# Patient Record
Sex: FEMALE | Race: WHITE | NOT HISPANIC OR LATINO | Employment: OTHER | URBAN - METROPOLITAN AREA
[De-identification: names, ages, dates, MRNs, and addresses within clinical notes are randomized per-mention and may not be internally consistent; named-entity substitution may affect disease eponyms.]

---

## 2021-01-08 ENCOUNTER — IMMUNIZATION (OUTPATIENT)
Dept: PHARMACY | Facility: CLINIC | Age: 71
End: 2021-01-08
Payer: MEDICARE

## 2021-01-08 DIAGNOSIS — Z23 NEED FOR VACCINATION: ICD-10-CM

## 2021-02-05 ENCOUNTER — IMMUNIZATION (OUTPATIENT)
Dept: PHARMACY | Facility: CLINIC | Age: 71
End: 2021-02-05
Payer: MEDICARE

## 2021-02-05 DIAGNOSIS — Z23 NEED FOR VACCINATION: Primary | ICD-10-CM

## 2021-02-09 ENCOUNTER — TELEPHONE (OUTPATIENT)
Dept: CARDIOLOGY | Facility: CLINIC | Age: 71
End: 2021-02-09

## 2021-03-12 ENCOUNTER — OFFICE VISIT (OUTPATIENT)
Dept: CARDIOLOGY | Facility: CLINIC | Age: 71
End: 2021-03-12
Payer: MEDICARE

## 2021-03-12 VITALS
SYSTOLIC BLOOD PRESSURE: 182 MMHG | WEIGHT: 186.94 LBS | HEIGHT: 69 IN | DIASTOLIC BLOOD PRESSURE: 83 MMHG | HEART RATE: 85 BPM | BODY MASS INDEX: 27.69 KG/M2

## 2021-03-12 DIAGNOSIS — I48.0 PAROXYSMAL ATRIAL FIBRILLATION: Chronic | ICD-10-CM

## 2021-03-12 DIAGNOSIS — Z79.899 LONG-TERM CURRENT USE OF HIGH RISK MEDICATION OTHER THAN ANTICOAGULANT: Chronic | ICD-10-CM

## 2021-03-12 DIAGNOSIS — Z51.81 ANTICOAGULATION MANAGEMENT ENCOUNTER: Chronic | ICD-10-CM

## 2021-03-12 DIAGNOSIS — E78.00 PURE HYPERCHOLESTEROLEMIA: Chronic | ICD-10-CM

## 2021-03-12 DIAGNOSIS — R09.89 BRUIT OF LEFT CAROTID ARTERY: ICD-10-CM

## 2021-03-12 DIAGNOSIS — Z79.01 ANTICOAGULATION MANAGEMENT ENCOUNTER: Chronic | ICD-10-CM

## 2021-03-12 DIAGNOSIS — I10 ESSENTIAL HYPERTENSION: Primary | Chronic | ICD-10-CM

## 2021-03-12 PROBLEM — I51.7 LEFT VENTRICULAR HYPERTROPHY: Status: ACTIVE | Noted: 2021-03-12

## 2021-03-12 PROBLEM — M81.0 OSTEOPOROSIS: Status: ACTIVE | Noted: 2021-03-12

## 2021-03-12 PROBLEM — E03.8 SUBCLINICAL HYPOTHYROIDISM: Status: ACTIVE | Noted: 2021-03-12

## 2021-03-12 PROBLEM — K22.2 RADIATION-INDUCED ESOPHAGEAL STRICTURE: Status: ACTIVE | Noted: 2017-03-28

## 2021-03-12 PROBLEM — C02.9 MALIGNANT NEOPLASM OF TONGUE: Status: ACTIVE | Noted: 2021-03-12

## 2021-03-12 PROBLEM — I34.1 MITRAL VALVE PROLAPSE: Status: ACTIVE | Noted: 2021-03-12

## 2021-03-12 PROCEDURE — 99999 PR PBB SHADOW E&M-EST. PATIENT-LVL III: ICD-10-PCS | Mod: PBBFAC,,, | Performed by: INTERNAL MEDICINE

## 2021-03-12 PROCEDURE — 99999 PR PBB SHADOW E&M-EST. PATIENT-LVL III: CPT | Mod: PBBFAC,,, | Performed by: INTERNAL MEDICINE

## 2021-03-12 PROCEDURE — 3008F PR BODY MASS INDEX (BMI) DOCUMENTED: ICD-10-PCS | Mod: CPTII,S$GLB,, | Performed by: INTERNAL MEDICINE

## 2021-03-12 PROCEDURE — 1159F MED LIST DOCD IN RCRD: CPT | Mod: S$GLB,,, | Performed by: INTERNAL MEDICINE

## 2021-03-12 PROCEDURE — 3079F DIAST BP 80-89 MM HG: CPT | Mod: CPTII,S$GLB,, | Performed by: INTERNAL MEDICINE

## 2021-03-12 PROCEDURE — 1126F PR PAIN SEVERITY QUANTIFIED, NO PAIN PRESENT: ICD-10-PCS | Mod: S$GLB,,, | Performed by: INTERNAL MEDICINE

## 2021-03-12 PROCEDURE — 3008F BODY MASS INDEX DOCD: CPT | Mod: CPTII,S$GLB,, | Performed by: INTERNAL MEDICINE

## 2021-03-12 PROCEDURE — 99214 PR OFFICE/OUTPT VISIT, EST, LEVL IV, 30-39 MIN: ICD-10-PCS | Mod: S$GLB,,, | Performed by: INTERNAL MEDICINE

## 2021-03-12 PROCEDURE — 3079F PR MOST RECENT DIASTOLIC BLOOD PRESSURE 80-89 MM HG: ICD-10-PCS | Mod: CPTII,S$GLB,, | Performed by: INTERNAL MEDICINE

## 2021-03-12 PROCEDURE — 3288F PR FALLS RISK ASSESSMENT DOCUMENTED: ICD-10-PCS | Mod: CPTII,S$GLB,, | Performed by: INTERNAL MEDICINE

## 2021-03-12 PROCEDURE — 1101F PT FALLS ASSESS-DOCD LE1/YR: CPT | Mod: CPTII,S$GLB,, | Performed by: INTERNAL MEDICINE

## 2021-03-12 PROCEDURE — 1159F PR MEDICATION LIST DOCUMENTED IN MEDICAL RECORD: ICD-10-PCS | Mod: S$GLB,,, | Performed by: INTERNAL MEDICINE

## 2021-03-12 PROCEDURE — 3288F FALL RISK ASSESSMENT DOCD: CPT | Mod: CPTII,S$GLB,, | Performed by: INTERNAL MEDICINE

## 2021-03-12 PROCEDURE — 1101F PR PT FALLS ASSESS DOC 0-1 FALLS W/OUT INJ PAST YR: ICD-10-PCS | Mod: CPTII,S$GLB,, | Performed by: INTERNAL MEDICINE

## 2021-03-12 PROCEDURE — 1126F AMNT PAIN NOTED NONE PRSNT: CPT | Mod: S$GLB,,, | Performed by: INTERNAL MEDICINE

## 2021-03-12 PROCEDURE — 99214 OFFICE O/P EST MOD 30 MIN: CPT | Mod: S$GLB,,, | Performed by: INTERNAL MEDICINE

## 2021-03-12 PROCEDURE — 3077F PR MOST RECENT SYSTOLIC BLOOD PRESSURE >= 140 MM HG: ICD-10-PCS | Mod: CPTII,S$GLB,, | Performed by: INTERNAL MEDICINE

## 2021-03-12 PROCEDURE — 3077F SYST BP >= 140 MM HG: CPT | Mod: CPTII,S$GLB,, | Performed by: INTERNAL MEDICINE

## 2021-03-12 RX ORDER — IRBESARTAN 150 MG/1
150 TABLET ORAL NIGHTLY
Qty: 90 TABLET | Refills: 3 | Status: SHIPPED | OUTPATIENT
Start: 2021-03-12 | End: 2022-05-02 | Stop reason: SDUPTHER

## 2021-03-12 RX ORDER — SPIRONOLACTONE AND HYDROCHLOROTHIAZIDE 25; 25 MG/1; MG/1
1 TABLET ORAL DAILY
Qty: 30 TABLET | Refills: 11 | Status: SHIPPED | OUTPATIENT
Start: 2021-03-12 | End: 2021-03-22 | Stop reason: SDUPTHER

## 2021-03-12 RX ORDER — APIXABAN 5 MG/1
5 TABLET, FILM COATED ORAL 2 TIMES DAILY
COMMUNITY
Start: 2021-01-17 | End: 2021-03-12 | Stop reason: SDUPTHER

## 2021-03-12 RX ORDER — APIXABAN 5 MG/1
5 TABLET, FILM COATED ORAL 2 TIMES DAILY
Qty: 180 TABLET | Refills: 3 | Status: SHIPPED | OUTPATIENT
Start: 2021-03-12 | End: 2022-06-10 | Stop reason: SDUPTHER

## 2021-03-12 RX ORDER — AMLODIPINE BESYLATE 10 MG/1
10 TABLET ORAL DAILY
COMMUNITY
Start: 2021-02-24 | End: 2021-03-12 | Stop reason: SDUPTHER

## 2021-03-12 RX ORDER — IRBESARTAN 150 MG/1
150 TABLET ORAL DAILY
COMMUNITY
Start: 2021-02-15 | End: 2021-03-12 | Stop reason: SDUPTHER

## 2021-03-12 RX ORDER — AMLODIPINE BESYLATE 10 MG/1
10 TABLET ORAL NIGHTLY
Qty: 90 TABLET | Refills: 3 | Status: SHIPPED | OUTPATIENT
Start: 2021-03-12 | End: 2022-05-02 | Stop reason: SDUPTHER

## 2021-03-12 RX ORDER — FLECAINIDE ACETATE 100 MG/1
100 TABLET ORAL 2 TIMES DAILY
COMMUNITY
Start: 2021-02-16 | End: 2023-04-14

## 2021-03-12 RX ORDER — ESOMEPRAZOLE MAGNESIUM 40 MG/1
40 CAPSULE, DELAYED RELEASE ORAL DAILY
COMMUNITY
Start: 2020-02-07

## 2021-03-13 ENCOUNTER — PATIENT MESSAGE (OUTPATIENT)
Dept: CARDIOLOGY | Facility: CLINIC | Age: 71
End: 2021-03-13

## 2021-03-19 ENCOUNTER — HOSPITAL ENCOUNTER (OUTPATIENT)
Dept: CARDIOLOGY | Facility: HOSPITAL | Age: 71
Discharge: HOME | End: 2021-03-19
Attending: INTERNAL MEDICINE
Payer: MEDICARE

## 2021-03-19 DIAGNOSIS — R09.89 BRUIT OF LEFT CAROTID ARTERY: ICD-10-CM

## 2021-03-19 LAB
LEFT ARM DIASTOLIC BLOOD PRESSURE: 70 MMHG
LEFT ARM SYSTOLIC BLOOD PRESSURE: 180 MMHG
LEFT CBA DIAS: 23 CM/S
LEFT CBA SYS: 134 CM/S
LEFT CCA DIST DIAS: 24 CM/S
LEFT CCA DIST SYS: 138 CM/S
LEFT CCA MID DIAS: 27 CM/S
LEFT CCA MID SYS: 123 CM/S
LEFT CCA PROX DIAS: 13 CM/S
LEFT CCA PROX SYS: 75 CM/S
LEFT ECA DIAS: 14 CM/S
LEFT ECA SYS: 140 CM/S
LEFT ICA DIST DIAS: 24 CM/S
LEFT ICA DIST SYS: 90 CM/S
LEFT ICA MID DIAS: 21 CM/S
LEFT ICA MID SYS: 88 CM/S
LEFT ICA PROX DIAS: 14 CM/S
LEFT ICA PROX SYS: 75 CM/S
LEFT VERTEBRAL DIAS: 20 CM/S
LEFT VERTEBRAL SYS: 100 CM/S
OHS CV CAROTID RIGHT ICA EDV HIGHEST: 36
OHS CV CAROTID ULTRASOUND LEFT ICA/CCA RATIO: 0.65
OHS CV CAROTID ULTRASOUND RIGHT ICA/CCA RATIO: 1.68
OHS CV PV CAROTID LEFT HIGHEST CCA: 138
OHS CV PV CAROTID LEFT HIGHEST ICA: 90
OHS CV PV CAROTID RIGHT HIGHEST CCA: 125
OHS CV PV CAROTID RIGHT HIGHEST ICA: 210
OHS CV US CAROTID LEFT HIGHEST EDV: 24
RIGHT ARM DIASTOLIC BLOOD PRESSURE: 70 MMHG
RIGHT ARM SYSTOLIC BLOOD PRESSURE: 170 MMHG
RIGHT CBA DIAS: 22 CM/S
RIGHT CBA SYS: 121 CM/S
RIGHT CCA DIST DIAS: 20 CM/S
RIGHT CCA DIST SYS: 125 CM/S
RIGHT CCA MID DIAS: 23 CM/S
RIGHT CCA MID SYS: 115 CM/S
RIGHT CCA PROX DIAS: 9 CM/S
RIGHT CCA PROX SYS: 62 CM/S
RIGHT ECA DIAS: 26 CM/S
RIGHT ECA SYS: 278 CM/S
RIGHT ICA DIST DIAS: 11 CM/S
RIGHT ICA DIST SYS: 72 CM/S
RIGHT ICA MID DIAS: 36 CM/S
RIGHT ICA MID SYS: 189 CM/S
RIGHT ICA PROX DIAS: 26 CM/S
RIGHT ICA PROX SYS: 210 CM/S
RIGHT VERTEBRAL DIAS: 15 CM/S
RIGHT VERTEBRAL SYS: 83 CM/S

## 2021-03-19 PROCEDURE — 93880 EXTRACRANIAL BILAT STUDY: CPT

## 2021-03-19 PROCEDURE — 93880 EXTRACRANIAL BILAT STUDY: CPT | Mod: 26,,, | Performed by: INTERNAL MEDICINE

## 2021-03-19 PROCEDURE — 93880 CV US DOPPLER CAROTID (CUPID ONLY): ICD-10-PCS | Mod: 26,,, | Performed by: INTERNAL MEDICINE

## 2021-03-21 ENCOUNTER — PATIENT MESSAGE (OUTPATIENT)
Dept: CARDIOLOGY | Facility: CLINIC | Age: 71
End: 2021-03-21

## 2021-03-22 DIAGNOSIS — I10 ESSENTIAL HYPERTENSION: Chronic | ICD-10-CM

## 2021-03-22 RX ORDER — SPIRONOLACTONE AND HYDROCHLOROTHIAZIDE 25; 25 MG/1; MG/1
1 TABLET ORAL DAILY
Qty: 90 TABLET | Refills: 3 | Status: SHIPPED | OUTPATIENT
Start: 2021-03-22 | End: 2021-03-23 | Stop reason: SDUPTHER

## 2021-03-23 DIAGNOSIS — I10 ESSENTIAL HYPERTENSION: Chronic | ICD-10-CM

## 2021-03-23 RX ORDER — SPIRONOLACTONE AND HYDROCHLOROTHIAZIDE 25; 25 MG/1; MG/1
1 TABLET ORAL DAILY
Qty: 90 TABLET | Refills: 3 | Status: SHIPPED | OUTPATIENT
Start: 2021-03-23 | End: 2022-06-13 | Stop reason: SDUPTHER

## 2021-03-24 ENCOUNTER — TELEPHONE (OUTPATIENT)
Dept: CARDIOLOGY | Facility: CLINIC | Age: 71
End: 2021-03-24

## 2021-03-24 ENCOUNTER — PATIENT MESSAGE (OUTPATIENT)
Dept: CARDIOLOGY | Facility: CLINIC | Age: 71
End: 2021-03-24

## 2021-03-24 DIAGNOSIS — E78.00 PURE HYPERCHOLESTEROLEMIA: ICD-10-CM

## 2021-03-24 DIAGNOSIS — R09.89 BRUIT OF LEFT CAROTID ARTERY: Primary | ICD-10-CM

## 2021-03-24 RX ORDER — ROSUVASTATIN CALCIUM 20 MG/1
20 TABLET, COATED ORAL NIGHTLY
Qty: 90 TABLET | Refills: 3 | Status: SHIPPED | OUTPATIENT
Start: 2021-03-24 | End: 2022-03-09 | Stop reason: SDUPTHER

## 2021-03-24 RX ORDER — ROSUVASTATIN CALCIUM 20 MG/1
20 TABLET, COATED ORAL DAILY
COMMUNITY
End: 2021-03-24 | Stop reason: SDUPTHER

## 2021-12-04 ENCOUNTER — OFFICE VISIT (OUTPATIENT)
Dept: URGENT CARE | Facility: CLINIC | Age: 71
End: 2021-12-04
Payer: MEDICARE

## 2021-12-04 VITALS
DIASTOLIC BLOOD PRESSURE: 87 MMHG | RESPIRATION RATE: 17 BRPM | HEART RATE: 75 BPM | TEMPERATURE: 99 F | HEIGHT: 69 IN | BODY MASS INDEX: 27.4 KG/M2 | OXYGEN SATURATION: 96 % | SYSTOLIC BLOOD PRESSURE: 152 MMHG | WEIGHT: 185 LBS

## 2021-12-04 DIAGNOSIS — J06.9 URI, ACUTE: Primary | ICD-10-CM

## 2021-12-04 DIAGNOSIS — J40 BRONCHITIS: ICD-10-CM

## 2021-12-04 LAB
CTP QC/QA: YES
SARS-COV-2 RDRP RESP QL NAA+PROBE: NEGATIVE

## 2021-12-04 PROCEDURE — U0002: ICD-10-PCS | Mod: QW,S$GLB,, | Performed by: FAMILY MEDICINE

## 2021-12-04 PROCEDURE — 4010F PR ACE/ARB THEARPY RXD/TAKEN: ICD-10-PCS | Mod: CPTII,S$GLB,, | Performed by: FAMILY MEDICINE

## 2021-12-04 PROCEDURE — 99203 OFFICE O/P NEW LOW 30 MIN: CPT | Mod: S$GLB,CS,, | Performed by: FAMILY MEDICINE

## 2021-12-04 PROCEDURE — 4010F ACE/ARB THERAPY RXD/TAKEN: CPT | Mod: CPTII,S$GLB,, | Performed by: FAMILY MEDICINE

## 2021-12-04 PROCEDURE — U0002 COVID-19 LAB TEST NON-CDC: HCPCS | Mod: QW,S$GLB,, | Performed by: FAMILY MEDICINE

## 2021-12-04 PROCEDURE — 71046 X-RAY EXAM CHEST 2 VIEWS: CPT | Mod: FY,S$GLB,, | Performed by: STUDENT IN AN ORGANIZED HEALTH CARE EDUCATION/TRAINING PROGRAM

## 2021-12-04 PROCEDURE — 71046 XR CHEST PA AND LATERAL: ICD-10-PCS | Mod: FY,S$GLB,, | Performed by: STUDENT IN AN ORGANIZED HEALTH CARE EDUCATION/TRAINING PROGRAM

## 2021-12-04 PROCEDURE — 99203 PR OFFICE/OUTPT VISIT, NEW, LEVL III, 30-44 MIN: ICD-10-PCS | Mod: S$GLB,CS,, | Performed by: FAMILY MEDICINE

## 2021-12-04 RX ORDER — GUAIFENESIN 600 MG/1
600 TABLET, EXTENDED RELEASE ORAL 2 TIMES DAILY
Qty: 14 TABLET | Refills: 2 | Status: SHIPPED | OUTPATIENT
Start: 2021-12-04 | End: 2021-12-11

## 2021-12-04 RX ORDER — AZITHROMYCIN 250 MG/1
TABLET, FILM COATED ORAL
Qty: 6 TABLET | Refills: 0 | Status: SHIPPED | OUTPATIENT
Start: 2021-12-04 | End: 2022-05-02 | Stop reason: ALTCHOICE

## 2021-12-04 RX ORDER — ALBUTEROL SULFATE 90 UG/1
2 AEROSOL, METERED RESPIRATORY (INHALATION) EVERY 6 HOURS PRN
Qty: 18 G | Refills: 3 | Status: SHIPPED | OUTPATIENT
Start: 2021-12-04 | End: 2022-05-02

## 2022-03-09 ENCOUNTER — PATIENT MESSAGE (OUTPATIENT)
Dept: CARDIOLOGY | Facility: CLINIC | Age: 72
End: 2022-03-09
Payer: MEDICARE

## 2022-03-09 DIAGNOSIS — E78.00 PURE HYPERCHOLESTEROLEMIA: ICD-10-CM

## 2022-03-09 DIAGNOSIS — R09.89 BRUIT OF LEFT CAROTID ARTERY: ICD-10-CM

## 2022-03-09 RX ORDER — ROSUVASTATIN CALCIUM 20 MG/1
20 TABLET, COATED ORAL NIGHTLY
Qty: 90 TABLET | Refills: 3 | Status: SHIPPED | OUTPATIENT
Start: 2022-03-09 | End: 2023-06-19 | Stop reason: SDUPTHER

## 2022-05-02 ENCOUNTER — OFFICE VISIT (OUTPATIENT)
Dept: CARDIOLOGY | Facility: CLINIC | Age: 72
End: 2022-05-02
Payer: MEDICARE

## 2022-05-02 VITALS
HEART RATE: 100 BPM | SYSTOLIC BLOOD PRESSURE: 144 MMHG | DIASTOLIC BLOOD PRESSURE: 85 MMHG | BODY MASS INDEX: 26.58 KG/M2 | HEIGHT: 69 IN | WEIGHT: 179.44 LBS

## 2022-05-02 DIAGNOSIS — I10 PRIMARY HYPERTENSION: ICD-10-CM

## 2022-05-02 DIAGNOSIS — R09.89 BRUIT OF LEFT CAROTID ARTERY: ICD-10-CM

## 2022-05-02 DIAGNOSIS — Z51.81 ANTICOAGULATION MANAGEMENT ENCOUNTER: ICD-10-CM

## 2022-05-02 DIAGNOSIS — Z79.01 ANTICOAGULATION MANAGEMENT ENCOUNTER: ICD-10-CM

## 2022-05-02 DIAGNOSIS — I48.0 PAROXYSMAL ATRIAL FIBRILLATION: Primary | ICD-10-CM

## 2022-05-02 DIAGNOSIS — E78.00 PURE HYPERCHOLESTEROLEMIA: ICD-10-CM

## 2022-05-02 PROCEDURE — 1101F PR PT FALLS ASSESS DOC 0-1 FALLS W/OUT INJ PAST YR: ICD-10-PCS | Mod: CPTII,S$GLB,, | Performed by: INTERNAL MEDICINE

## 2022-05-02 PROCEDURE — 1159F MED LIST DOCD IN RCRD: CPT | Mod: CPTII,S$GLB,, | Performed by: INTERNAL MEDICINE

## 2022-05-02 PROCEDURE — 4010F PR ACE/ARB THEARPY RXD/TAKEN: ICD-10-PCS | Mod: CPTII,S$GLB,, | Performed by: INTERNAL MEDICINE

## 2022-05-02 PROCEDURE — 1126F PR PAIN SEVERITY QUANTIFIED, NO PAIN PRESENT: ICD-10-PCS | Mod: CPTII,S$GLB,, | Performed by: INTERNAL MEDICINE

## 2022-05-02 PROCEDURE — 99214 PR OFFICE/OUTPT VISIT, EST, LEVL IV, 30-39 MIN: ICD-10-PCS | Mod: 25,S$GLB,, | Performed by: INTERNAL MEDICINE

## 2022-05-02 PROCEDURE — 1159F PR MEDICATION LIST DOCUMENTED IN MEDICAL RECORD: ICD-10-PCS | Mod: CPTII,S$GLB,, | Performed by: INTERNAL MEDICINE

## 2022-05-02 PROCEDURE — 1126F AMNT PAIN NOTED NONE PRSNT: CPT | Mod: CPTII,S$GLB,, | Performed by: INTERNAL MEDICINE

## 2022-05-02 PROCEDURE — 4010F ACE/ARB THERAPY RXD/TAKEN: CPT | Mod: CPTII,S$GLB,, | Performed by: INTERNAL MEDICINE

## 2022-05-02 PROCEDURE — 1160F PR REVIEW ALL MEDS BY PRESCRIBER/CLIN PHARMACIST DOCUMENTED: ICD-10-PCS | Mod: CPTII,S$GLB,, | Performed by: INTERNAL MEDICINE

## 2022-05-02 PROCEDURE — 3008F BODY MASS INDEX DOCD: CPT | Mod: CPTII,S$GLB,, | Performed by: INTERNAL MEDICINE

## 2022-05-02 PROCEDURE — 99999 PR PBB SHADOW E&M-EST. PATIENT-LVL III: CPT | Mod: PBBFAC,,, | Performed by: INTERNAL MEDICINE

## 2022-05-02 PROCEDURE — 99214 OFFICE O/P EST MOD 30 MIN: CPT | Mod: 25,S$GLB,, | Performed by: INTERNAL MEDICINE

## 2022-05-02 PROCEDURE — 3288F FALL RISK ASSESSMENT DOCD: CPT | Mod: CPTII,S$GLB,, | Performed by: INTERNAL MEDICINE

## 2022-05-02 PROCEDURE — 1101F PT FALLS ASSESS-DOCD LE1/YR: CPT | Mod: CPTII,S$GLB,, | Performed by: INTERNAL MEDICINE

## 2022-05-02 PROCEDURE — 3079F DIAST BP 80-89 MM HG: CPT | Mod: CPTII,S$GLB,, | Performed by: INTERNAL MEDICINE

## 2022-05-02 PROCEDURE — 3077F SYST BP >= 140 MM HG: CPT | Mod: CPTII,S$GLB,, | Performed by: INTERNAL MEDICINE

## 2022-05-02 PROCEDURE — 3008F PR BODY MASS INDEX (BMI) DOCUMENTED: ICD-10-PCS | Mod: CPTII,S$GLB,, | Performed by: INTERNAL MEDICINE

## 2022-05-02 PROCEDURE — 1160F RVW MEDS BY RX/DR IN RCRD: CPT | Mod: CPTII,S$GLB,, | Performed by: INTERNAL MEDICINE

## 2022-05-02 PROCEDURE — 93000 ELECTROCARDIOGRAM COMPLETE: CPT | Mod: S$GLB,,, | Performed by: INTERNAL MEDICINE

## 2022-05-02 PROCEDURE — 3079F PR MOST RECENT DIASTOLIC BLOOD PRESSURE 80-89 MM HG: ICD-10-PCS | Mod: CPTII,S$GLB,, | Performed by: INTERNAL MEDICINE

## 2022-05-02 PROCEDURE — 99999 PR PBB SHADOW E&M-EST. PATIENT-LVL III: ICD-10-PCS | Mod: PBBFAC,,, | Performed by: INTERNAL MEDICINE

## 2022-05-02 PROCEDURE — 3288F PR FALLS RISK ASSESSMENT DOCUMENTED: ICD-10-PCS | Mod: CPTII,S$GLB,, | Performed by: INTERNAL MEDICINE

## 2022-05-02 PROCEDURE — 93000 EKG 12-LEAD: ICD-10-PCS | Mod: S$GLB,,, | Performed by: INTERNAL MEDICINE

## 2022-05-02 PROCEDURE — 3077F PR MOST RECENT SYSTOLIC BLOOD PRESSURE >= 140 MM HG: ICD-10-PCS | Mod: CPTII,S$GLB,, | Performed by: INTERNAL MEDICINE

## 2022-05-02 RX ORDER — IRBESARTAN 150 MG/1
150 TABLET ORAL NIGHTLY
Qty: 90 TABLET | Refills: 3 | Status: SHIPPED | OUTPATIENT
Start: 2022-05-02 | End: 2023-05-09 | Stop reason: SDUPTHER

## 2022-05-02 RX ORDER — AMLODIPINE BESYLATE 10 MG/1
10 TABLET ORAL NIGHTLY
Qty: 90 TABLET | Refills: 3 | Status: SHIPPED | OUTPATIENT
Start: 2022-05-02 | End: 2023-05-09 | Stop reason: SDUPTHER

## 2022-05-02 RX ORDER — METOPROLOL SUCCINATE 25 MG/1
25 TABLET, EXTENDED RELEASE ORAL DAILY
Qty: 30 TABLET | Refills: 11 | Status: SHIPPED | OUTPATIENT
Start: 2022-05-02 | End: 2022-06-13 | Stop reason: SDUPTHER

## 2022-05-02 NOTE — PROGRESS NOTES
Subjective:   05/02/2022     Patient ID:  Juliana Mayorga is a 71 y.o. female who presents for evaulation of Mitral Valve Prolapse and Hypertension      Comes in for follow-up, does have hypertension, generally at home it is fairly well controlled, but mildly elevated, 132/85 last week.  It is very elevated today, on average 179/98.  She has not had chest pains or tightness, PND or orthopnea.  She does not have a history of coronary artery disease.  She has gained a significant amount of weight lately.  She has a history of radiation to her neck and throat cancer.  She has had esophageal dilatations and now can eat hamburgers.    Paroxysmal atrial fibrillation is present, it is controlled on flecainide.  She remains on anticoagulation.    A lipid profile last year showed a total cholesterol 308, ,  and triglycerides 57.    She is anticoagulated with Eliquis, she does not have bleeding problems.    Hypertension  Pertinent negatives include no chest pain, headaches, malaise/fatigue, orthopnea, palpitations, PND or shortness of breath.       Patient Active Problem List   Diagnosis    Insomnia due to medical condition    Hypertension    Left ventricular hypertrophy    Malignant neoplasm of tongue    Mitral valve prolapse    Osteoporosis    Paroxysmal atrial fibrillation    Radiation-induced esophageal stricture    Subclinical hypothyroidism    Long-term current use of high risk medication other than anticoagulant    Anticoagulation management encounter    Bruit of left carotid artery    Long term current use of anticoagulant    Pure hypercholesterolemia        Past Medical History:   Diagnosis Date    Hypertension        Past Surgical History:   Procedure Laterality Date     egd   once per [facility] at [facility]      BREAST LUMPECTOMY Right 2013    CARDIOVERSION         Review of patient's allergies indicates:   Allergen Reactions    Cefazolin      Other reaction(s): Extreme pain left  leg and right arm  Other reaction(s): Extreme pain left leg and right arm         Current Outpatient Medications:     ELIQUIS 5 mg Tab, Take 1 tablet (5 mg total) by mouth 2 (two) times daily., Disp: 180 tablet, Rfl: 3    esomeprazole (NEXIUM) 40 MG capsule, Take 40 mg by mouth once daily. , Disp: , Rfl:     eszopiclone 3 mg Tab, Take 1 tablet (3 mg total) by mouth every evening., Disp: 90 tablet, Rfl: 1    flecainide (TAMBOCOR) 100 MG Tab, Take 100 mg by mouth 2 (two) times daily., Disp: , Rfl:     rosuvastatin (CRESTOR) 20 MG tablet, Take 1 tablet (20 mg total) by mouth every evening., Disp: 90 tablet, Rfl: 3    amLODIPine (NORVASC) 10 MG tablet, Take 1 tablet (10 mg total) by mouth nightly., Disp: 90 tablet, Rfl: 3    irbesartan (AVAPRO) 150 MG tablet, Take 1 tablet (150 mg total) by mouth every evening., Disp: 90 tablet, Rfl: 3    metoprolol succinate (TOPROL-XL) 25 MG 24 hr tablet, Take 1 tablet (25 mg total) by mouth once daily., Disp: 30 tablet, Rfl: 11    spironolactone-hydrochlorothiazide 25-25mg (ALDACTAZIDE) 25-25 mg Tab, Take 1 tablet by mouth once daily., Disp: 90 tablet, Rfl: 3     Social History     Socioeconomic History    Marital status:    Tobacco Use    Smoking status: Former Smoker     Quit date: 2005     Years since quittin.3    Smokeless tobacco: Never Used   Substance and Sexual Activity    Alcohol use: Yes     Alcohol/week: 3.0 standard drinks     Types: 3 Glasses of wine per week       Family History   Problem Relation Age of Onset    Alzheimer's disease Mother     Cancer Father         Objective:   Review of Systems   Constitutional: Negative for chills, decreased appetite, diaphoresis, fever, malaise/fatigue, weight gain and weight loss.   HENT: Negative for congestion, hearing loss, nosebleeds and sore throat.    Eyes: Negative for double vision, vision loss in left eye and vision loss in right eye.   Cardiovascular: Negative for chest pain, claudication,  cyanosis, dyspnea on exertion, irregular heartbeat, leg swelling, near-syncope, orthopnea, palpitations, paroxysmal nocturnal dyspnea and syncope.   Respiratory: Negative for cough, hemoptysis, shortness of breath, sleep disturbances due to breathing, snoring and wheezing.    Endocrine: Negative for cold intolerance and heat intolerance.   Hematologic/Lymphatic: Negative for adenopathy and bleeding problem. Does not bruise/bleed easily.   Skin: Negative for itching, nail changes and rash.   Musculoskeletal: Negative for arthritis, back pain, falls and myalgias.   Gastrointestinal: Negative for bloating, abdominal pain, anorexia, constipation, diarrhea, hematochezia, melena, nausea and vomiting.   Genitourinary: Negative for frequency, hematuria and nocturia.   Neurological: Negative for focal weakness, headaches, vertigo and weakness.   Psychiatric/Behavioral: Negative for altered mental status, depression and memory loss.   Allergic/Immunologic: Negative for hives.       Vitals:    05/02/22 1333   BP: (!) 144/85   Pulse: 100       Physical Exam  Vitals reviewed.   Constitutional:       General: She is not in acute distress.     Appearance: She is well-developed.   HENT:      Head: Normocephalic and atraumatic.      Nose: Nose normal.   Eyes:      Conjunctiva/sclera: Conjunctivae normal.      Pupils: Pupils are equal, round, and reactive to light.   Neck:      Vascular: No JVD.   Cardiovascular:      Rate and Rhythm: Normal rate and regular rhythm.      Pulses: Intact distal pulses.      Heart sounds: Normal heart sounds. No murmur heard.    No friction rub. No gallop.      Comments: Karlos venous pressure is normal  Pulmonary:      Effort: Pulmonary effort is normal. No respiratory distress.      Breath sounds: Normal breath sounds. No wheezing or rales.   Chest:      Chest wall: No tenderness.   Abdominal:      General: Bowel sounds are normal. There is no distension.      Palpations: Abdomen is soft.       Tenderness: There is no abdominal tenderness.   Musculoskeletal:         General: No tenderness or deformity. Normal range of motion.      Cervical back: Normal range of motion and neck supple.      Right lower leg: Edema ( Trace) present.      Left lower leg: Edema (Trace) present.   Skin:     General: Skin is warm and dry.      Findings: No erythema or rash.   Neurological:      Mental Status: She is alert and oriented to person, place, and time.      Cranial Nerves: No cranial nerve deficit.      Motor: No abnormal muscle tone.      Coordination: Coordination normal.   Psychiatric:         Behavior: Behavior normal.         Thought Content: Thought content normal.         Judgment: Judgment normal.       EKG today is somewhat difficult to interpret, but I think does show atrial fibrillation with a ventricular response of 93 beats per minute.  It is irregular.  No definite regular associations P waves to QRS of present    Assessment and Plan:     Paroxysmal atrial fibrillation  -     EKG 12-lead  -     Magnesium; Future; Expected date: 05/02/2022  -     TSH; Future; Expected date: 05/02/2022  -     Echo; Future; Expected date: 05/09/2022  -     metoprolol succinate (TOPROL-XL) 25 MG 24 hr tablet; Take 1 tablet (25 mg total) by mouth once daily.  Dispense: 30 tablet; Refill: 11    Primary hypertension  Comments:  Well controlled at home  Orders:  -     irbesartan (AVAPRO) 150 MG tablet; Take 1 tablet (150 mg total) by mouth every evening.  Dispense: 90 tablet; Refill: 3  -     amLODIPine (NORVASC) 10 MG tablet; Take 1 tablet (10 mg total) by mouth nightly.  Dispense: 90 tablet; Refill: 3  -     Comprehensive Metabolic Panel; Future; Expected date: 05/09/2022    Bruit of left carotid artery    Anticoagulation management encounter  -     CBC Auto Differential; Future; Expected date: 05/09/2022    Pure hypercholesterolemia  Comments:  Check lipid on high-dose statin  Orders:  -     Lipid Panel; Future; Expected date:  05/09/2022       I will have laboratory work and echocardiogram assess her cardiac status.  Will forward to Dr. Platt so that he can assess for atrial fibrillation.  I will begin metoprolol succinate 25 mg daily for rate control    Follow up in about 1 year (around 5/2/2023).

## 2022-05-03 ENCOUNTER — TELEPHONE (OUTPATIENT)
Dept: CARDIOLOGY | Facility: CLINIC | Age: 72
End: 2022-05-03
Payer: MEDICARE

## 2022-05-03 NOTE — TELEPHONE ENCOUNTER
----- Message from Inessa Whittington sent at 5/3/2022  1:49 PM CDT -----  Regarding: [facility] orders  Good afternoon,    Mrs Andrews needs [facility] orders in order to be able to do labs here because [facility] is not in network with us. Would you mind if I change them to [facility]? Please let me know.    Thank you,  Inessa

## 2022-05-23 ENCOUNTER — PATIENT MESSAGE (OUTPATIENT)
Dept: CARDIOLOGY | Facility: CLINIC | Age: 72
End: 2022-05-23

## 2022-05-23 ENCOUNTER — HOSPITAL ENCOUNTER (OUTPATIENT)
Dept: CARDIOLOGY | Facility: HOSPITAL | Age: 72
Discharge: HOME | End: 2022-05-23
Attending: INTERNAL MEDICINE
Payer: MEDICARE

## 2022-05-23 VITALS
DIASTOLIC BLOOD PRESSURE: 80 MMHG | SYSTOLIC BLOOD PRESSURE: 124 MMHG | HEART RATE: 81 BPM | WEIGHT: 179 LBS | HEIGHT: 69 IN | BODY MASS INDEX: 26.51 KG/M2

## 2022-05-23 DIAGNOSIS — I48.0 PAROXYSMAL ATRIAL FIBRILLATION: ICD-10-CM

## 2022-05-23 PROBLEM — Z51.81 ENCOUNTER FOR MONITORING FLECAINIDE THERAPY: Status: ACTIVE | Noted: 2022-05-23

## 2022-05-23 PROBLEM — Z79.899 ENCOUNTER FOR MONITORING FLECAINIDE THERAPY: Status: ACTIVE | Noted: 2022-05-23

## 2022-05-23 LAB
ASCENDING AORTA: 3.34 CM
AV INDEX (PROSTH): 0.98
AV MEAN GRADIENT: 2 MMHG
AV PEAK GRADIENT: 4 MMHG
AV VALVE AREA: 3.83 CM2
AV VELOCITY RATIO: 0.85
BSA FOR ECHO PROCEDURE: 1.99 M2
CV ECHO LV RWT: 0.43 CM
DOP CALC AO PEAK VEL: 0.95 M/S
DOP CALC AO VTI: 22.03 CM
DOP CALC LVOT AREA: 3.9 CM2
DOP CALC LVOT DIAMETER: 2.23 CM
DOP CALC LVOT PEAK VEL: 0.81 M/S
DOP CALC LVOT STROKE VOLUME: 84.48 CM3
DOP CALC MV VTI: 43.01 CM
DOP CALCLVOT PEAK VEL VTI: 21.64 CM
E WAVE DECELERATION TIME: 211.14 MSEC
E/A RATIO: 2.55
E/E' RATIO: 29.85 M/S
ECHO LV POSTERIOR WALL: 0.9 CM (ref 0.6–1.1)
EJECTION FRACTION: 65 %
FRACTIONAL SHORTENING: 38 % (ref 28–44)
INTERVENTRICULAR SEPTUM: 1.05 CM (ref 0.6–1.1)
IVRT: 74.22 MSEC
LA MAJOR: 6.73 CM
LA MINOR: 6.43 CM
LA WIDTH: 4.81 CM
LEFT ATRIUM SIZE: 4 CM
LEFT ATRIUM VOLUME INDEX MOD: 58.1 ML/M2
LEFT ATRIUM VOLUME INDEX: 54.6 ML/M2
LEFT ATRIUM VOLUME MOD: 114.37 CM3
LEFT ATRIUM VOLUME: 107.55 CM3
LEFT INTERNAL DIMENSION IN SYSTOLE: 2.63 CM (ref 2.1–4)
LEFT VENTRICLE DIASTOLIC VOLUME INDEX: 40.43 ML/M2
LEFT VENTRICLE DIASTOLIC VOLUME: 79.65 ML
LEFT VENTRICLE MASS INDEX: 68 G/M2
LEFT VENTRICLE SYSTOLIC VOLUME INDEX: 12.8 ML/M2
LEFT VENTRICLE SYSTOLIC VOLUME: 25.29 ML
LEFT VENTRICULAR INTERNAL DIMENSION IN DIASTOLE: 4.22 CM (ref 3.5–6)
LEFT VENTRICULAR MASS: 133.5 G
LV LATERAL E/E' RATIO: 24.25 M/S
LV SEPTAL E/E' RATIO: 38.8 M/S
MV MEAN GRADIENT: 1 MMHG
MV PEAK A VEL: 0.76 M/S
MV PEAK E VEL: 1.94 M/S
MV PEAK GRADIENT: 20 MMHG
MV STENOSIS PRESSURE HALF TIME: 61.23 MS
MV VALVE AREA BY CONTINUITY EQUATION: 1.96 CM2
MV VALVE AREA P 1/2 METHOD: 3.59 CM2
PISA TR MAX VEL: 3.62 M/S
PULM VEIN S/D RATIO: 0.71
PV PEAK D VEL: 0.41 M/S
PV PEAK S VEL: 0.29 M/S
RA MAJOR: 6.56 CM
RA PRESSURE: 3 MMHG
RA WIDTH: 3.95 CM
RIGHT VENTRICULAR END-DIASTOLIC DIMENSION: 3.6 CM
SINUS: 3.11 CM
STJ: 2.94 CM
TDI LATERAL: 0.08 M/S
TDI SEPTAL: 0.05 M/S
TDI: 0.07 M/S
TR MAX PG: 52 MMHG
TRICUSPID ANNULAR PLANE SYSTOLIC EXCURSION: 1.68 CM
TV REST PULMONARY ARTERY PRESSURE: 55 MMHG

## 2022-05-23 PROCEDURE — 93306 TTE W/DOPPLER COMPLETE: CPT

## 2022-05-23 PROCEDURE — 93306 ECHO (CUPID ONLY): ICD-10-PCS | Mod: 26,,, | Performed by: INTERNAL MEDICINE

## 2022-05-23 PROCEDURE — 93306 TTE W/DOPPLER COMPLETE: CPT | Mod: 26,,, | Performed by: INTERNAL MEDICINE

## 2022-05-30 ENCOUNTER — TELEPHONE (OUTPATIENT)
Dept: CARDIOLOGY | Facility: CLINIC | Age: 72
End: 2022-05-30
Payer: MEDICARE

## 2022-05-30 ENCOUNTER — PATIENT MESSAGE (OUTPATIENT)
Dept: CARDIOLOGY | Facility: CLINIC | Age: 72
End: 2022-05-30
Payer: MEDICARE

## 2022-05-30 NOTE — TELEPHONE ENCOUNTER
Pt attempting to get patient assistance for Eliquis with [employer]; forms completed and faxed to company at [phone];

## 2022-06-16 ENCOUNTER — PATIENT MESSAGE (OUTPATIENT)
Dept: CARDIOLOGY | Facility: CLINIC | Age: 72
End: 2022-06-16
Payer: MEDICARE

## 2022-06-16 DIAGNOSIS — I10 PRIMARY HYPERTENSION: Primary | ICD-10-CM

## 2022-06-16 DIAGNOSIS — Z79.01 ANTICOAGULATION MANAGEMENT ENCOUNTER: ICD-10-CM

## 2022-06-16 DIAGNOSIS — E78.00 PURE HYPERCHOLESTEROLEMIA: ICD-10-CM

## 2022-06-16 DIAGNOSIS — E03.8 SUBCLINICAL HYPOTHYROIDISM: ICD-10-CM

## 2022-06-16 DIAGNOSIS — Z51.81 ANTICOAGULATION MANAGEMENT ENCOUNTER: ICD-10-CM

## 2022-06-21 ENCOUNTER — TELEPHONE (OUTPATIENT)
Dept: CARDIOLOGY | Facility: CLINIC | Age: 72
End: 2022-06-21
Payer: MEDICARE

## 2022-06-21 DIAGNOSIS — I10 PRIMARY HYPERTENSION: ICD-10-CM

## 2022-06-21 DIAGNOSIS — E87.5 HYPERKALEMIA: Primary | ICD-10-CM

## 2022-06-21 LAB
ALBUMIN: 5 G/DL (ref 3.5–5)
ALP SERPL-CCNC: 93 U/L (ref 38–126)
ALT SERPL W P-5'-P-CCNC: 15 U/L (ref 7–56)
ANION GAP SERPL CALC-SCNC: 18.7 MMOL/L (ref 9–18)
AST SERPL-CCNC: 23 U/L (ref 7–40)
BASOPHILS ABSOLUTE COUNT: 0 THOUSAND/UL (ref 0–0.2)
BASOPHILS NFR BLD: 0.5 % (ref 0–2)
BILIRUB SERPL-MCNC: 0.5 MG/DL (ref 0–1.2)
BUN BLD-MCNC: 29 MG/DL (ref 7–21)
BUN/CREAT SERPL: 23 (ref 6–22)
CALC OSMOLALITY: 280 MOSM/KG (ref 275–295)
CALCIUM SERPL-MCNC: 9.7 MG/DL (ref 8.5–10.3)
CHLORIDE SERPL-SCNC: 103 MMOL/L (ref 98–107)
CHOL/HDLC RATIO: 2.3
CHOLEST SERPL-MSCNC: 216 MG/DL (ref 100–200)
CO2 SERPL-SCNC: 21 MMOL/L (ref 21–31)
CREAT SERPL-MCNC: 1.26 MG/DL (ref 0.5–1)
EGFR: 50 ML/MIN
EOSINOPHIL NFR BLD: 4.2 % (ref 0–4)
EOSINOPHILS ABSOLUTE COUNT: 0.3 THOUSAND/UL (ref 0–0.7)
ERYTHROCYTE [DISTWIDTH] IN BLOOD BY AUTOMATED COUNT: 16.7 % (ref 12–15.3)
GFR: 43 ML/MIN
GLUCOSE SERPL-MCNC: 102 MG/DL (ref 70–100)
HCT VFR BLD AUTO: 41.3 % (ref 37–47)
HDLC SERPL-MCNC: 94 MG/DL (ref 40–75)
HGB BLD-MCNC: 13.5 GM/DL (ref 12–16)
LDLC SERPL CALC-MCNC: 108 MG/DL (ref 0–125)
LYMPHOCYTES %: 29.1 % (ref 15–45)
LYMPHOCYTES ABSOLUTE COUNT: 1.9 THOUSAND/UL (ref 1–4.2)
MAGNESIUM SERPL-MCNC: 1.5 MG/DL (ref 1.7–2.2)
MCH RBC QN AUTO: 31.5 PG (ref 27–33)
MCHC RBC AUTO-ENTMCNC: 32.8 G/DL (ref 32–36)
MCV RBC AUTO: 95.8 FL (ref 81–99)
MONOCYTES %: 8.1 % (ref 3–13)
MONOCYTES ABSOLUTE COUNT: 0.5 THOUSAND/UL (ref 0.1–0.8)
NEUTROPHILS ABSOLUTE COUNT: 3.7 THOUSAND/UL (ref 2.1–7.6)
NEUTROPHILS RELATIVE PERCENT: 58.1 % (ref 32–80)
PLATELET # BLD AUTO: 246 THOUSAND/UL (ref 150–350)
PMV BLD AUTO: 8.3 FL (ref 7–10.2)
POTASSIUM SERPL-SCNC: 5.7 MMOL/L (ref 3.5–5)
RBC # BLD AUTO: 4.31 MILLION/UL (ref 4.2–5.4)
SODIUM BLD-SCNC: 137 MMOL/L (ref 135–145)
TOTAL PROTEIN: 7.4 G/DL (ref 6.3–8.2)
TRIGL SERPL-MCNC: 90 MG/DL (ref 30–150)
TSH SERPL DL<=0.005 MIU/L-ACNC: 6.51 UIU/ML (ref 0.35–4)
WBC # BLD AUTO: 6.4 THOUSAND/UL (ref 4.5–11)

## 2022-06-21 RX ORDER — HYDROCHLOROTHIAZIDE 25 MG/1
25 TABLET ORAL DAILY
Qty: 90 TABLET | Refills: 3 | Status: SHIPPED | OUTPATIENT
Start: 2022-06-21 | End: 2023-04-14

## 2022-06-21 NOTE — TELEPHONE ENCOUNTER
Laboratory reviewed, hyperkalemia is present.  Will discontinue spironolactone/ACT and continue hydrochlorothiazide 25 mg 1 daily.  She should have repeat BMP in 1 month

## 2022-07-07 ENCOUNTER — PATIENT MESSAGE (OUTPATIENT)
Dept: CARDIOLOGY | Facility: CLINIC | Age: 72
End: 2022-07-07
Payer: MEDICARE

## 2022-07-13 ENCOUNTER — PATIENT MESSAGE (OUTPATIENT)
Dept: CARDIOLOGY | Facility: CLINIC | Age: 72
End: 2022-07-13
Payer: MEDICARE

## 2022-07-13 LAB
ANION GAP SERPL CALC-SCNC: 17 MMOL/L (ref 9–18)
BUN BLD-MCNC: 22 MG/DL (ref 7–21)
BUN/CREAT SERPL: 19 (ref 6–22)
CALC OSMOLALITY: 281 MOSM/KG (ref 275–295)
CALCIUM SERPL-MCNC: 10 MG/DL (ref 8.5–10.3)
CHLORIDE SERPL-SCNC: 104 MMOL/L (ref 98–107)
CO2 SERPL-SCNC: 23 MMOL/L (ref 21–31)
CREAT SERPL-MCNC: 1.18 MG/DL (ref 0.5–1)
EGFR: 54 ML/MIN
GFR: 46 ML/MIN
GLUCOSE SERPL-MCNC: 97 MG/DL (ref 70–100)
POTASSIUM SERPL-SCNC: 5 MMOL/L (ref 3.5–5)
SODIUM BLD-SCNC: 139 MMOL/L (ref 135–145)

## 2022-08-09 ENCOUNTER — PATIENT MESSAGE (OUTPATIENT)
Dept: CARDIOLOGY | Facility: CLINIC | Age: 72
End: 2022-08-09
Payer: MEDICARE

## 2022-08-10 DIAGNOSIS — I10 ESSENTIAL HYPERTENSION: Chronic | ICD-10-CM

## 2022-08-10 RX ORDER — APIXABAN 5 MG/1
5 TABLET, FILM COATED ORAL 2 TIMES DAILY
Qty: 60 TABLET | Refills: 11 | Status: SHIPPED | OUTPATIENT
Start: 2022-08-10 | End: 2023-06-26 | Stop reason: SDUPTHER

## 2023-04-14 ENCOUNTER — OFFICE VISIT (OUTPATIENT)
Dept: CARDIOLOGY | Facility: CLINIC | Age: 73
End: 2023-04-14
Payer: MEDICARE

## 2023-04-14 VITALS
SYSTOLIC BLOOD PRESSURE: 120 MMHG | BODY MASS INDEX: 25.93 KG/M2 | HEART RATE: 70 BPM | HEIGHT: 69 IN | DIASTOLIC BLOOD PRESSURE: 70 MMHG | WEIGHT: 175.06 LBS

## 2023-04-14 DIAGNOSIS — Z51.81 ANTICOAGULATION MANAGEMENT ENCOUNTER: ICD-10-CM

## 2023-04-14 DIAGNOSIS — Z79.01 ANTICOAGULATION MANAGEMENT ENCOUNTER: ICD-10-CM

## 2023-04-14 DIAGNOSIS — I10 PRIMARY HYPERTENSION: Primary | ICD-10-CM

## 2023-04-14 DIAGNOSIS — I48.0 PAROXYSMAL ATRIAL FIBRILLATION: ICD-10-CM

## 2023-04-14 DIAGNOSIS — E78.00 PURE HYPERCHOLESTEROLEMIA: ICD-10-CM

## 2023-04-14 DIAGNOSIS — I65.23 CAROTID STENOSIS, ASYMPTOMATIC, BILATERAL: ICD-10-CM

## 2023-04-14 DIAGNOSIS — R01.1 SYSTOLIC MURMUR: ICD-10-CM

## 2023-04-14 PROCEDURE — 99999 PR PBB SHADOW E&M-EST. PATIENT-LVL III: CPT | Mod: PBBFAC,,, | Performed by: INTERNAL MEDICINE

## 2023-04-14 PROCEDURE — 99214 PR OFFICE/OUTPT VISIT, EST, LEVL IV, 30-39 MIN: ICD-10-PCS | Mod: S$GLB,,, | Performed by: INTERNAL MEDICINE

## 2023-04-14 PROCEDURE — 1101F PR PT FALLS ASSESS DOC 0-1 FALLS W/OUT INJ PAST YR: ICD-10-PCS | Mod: CPTII,S$GLB,, | Performed by: INTERNAL MEDICINE

## 2023-04-14 PROCEDURE — 1160F RVW MEDS BY RX/DR IN RCRD: CPT | Mod: CPTII,S$GLB,, | Performed by: INTERNAL MEDICINE

## 2023-04-14 PROCEDURE — 1126F PR PAIN SEVERITY QUANTIFIED, NO PAIN PRESENT: ICD-10-PCS | Mod: CPTII,S$GLB,, | Performed by: INTERNAL MEDICINE

## 2023-04-14 PROCEDURE — 4010F ACE/ARB THERAPY RXD/TAKEN: CPT | Mod: CPTII,S$GLB,, | Performed by: INTERNAL MEDICINE

## 2023-04-14 PROCEDURE — 4010F PR ACE/ARB THEARPY RXD/TAKEN: ICD-10-PCS | Mod: CPTII,S$GLB,, | Performed by: INTERNAL MEDICINE

## 2023-04-14 PROCEDURE — 1159F PR MEDICATION LIST DOCUMENTED IN MEDICAL RECORD: ICD-10-PCS | Mod: CPTII,S$GLB,, | Performed by: INTERNAL MEDICINE

## 2023-04-14 PROCEDURE — 3074F SYST BP LT 130 MM HG: CPT | Mod: CPTII,S$GLB,, | Performed by: INTERNAL MEDICINE

## 2023-04-14 PROCEDURE — 1160F PR REVIEW ALL MEDS BY PRESCRIBER/CLIN PHARMACIST DOCUMENTED: ICD-10-PCS | Mod: CPTII,S$GLB,, | Performed by: INTERNAL MEDICINE

## 2023-04-14 PROCEDURE — 3078F PR MOST RECENT DIASTOLIC BLOOD PRESSURE < 80 MM HG: ICD-10-PCS | Mod: CPTII,S$GLB,, | Performed by: INTERNAL MEDICINE

## 2023-04-14 PROCEDURE — 3008F BODY MASS INDEX DOCD: CPT | Mod: CPTII,S$GLB,, | Performed by: INTERNAL MEDICINE

## 2023-04-14 PROCEDURE — 1159F MED LIST DOCD IN RCRD: CPT | Mod: CPTII,S$GLB,, | Performed by: INTERNAL MEDICINE

## 2023-04-14 PROCEDURE — 99999 PR PBB SHADOW E&M-EST. PATIENT-LVL III: ICD-10-PCS | Mod: PBBFAC,,, | Performed by: INTERNAL MEDICINE

## 2023-04-14 PROCEDURE — 1101F PT FALLS ASSESS-DOCD LE1/YR: CPT | Mod: CPTII,S$GLB,, | Performed by: INTERNAL MEDICINE

## 2023-04-14 PROCEDURE — 3288F FALL RISK ASSESSMENT DOCD: CPT | Mod: CPTII,S$GLB,, | Performed by: INTERNAL MEDICINE

## 2023-04-14 PROCEDURE — 3008F PR BODY MASS INDEX (BMI) DOCUMENTED: ICD-10-PCS | Mod: CPTII,S$GLB,, | Performed by: INTERNAL MEDICINE

## 2023-04-14 PROCEDURE — 3288F PR FALLS RISK ASSESSMENT DOCUMENTED: ICD-10-PCS | Mod: CPTII,S$GLB,, | Performed by: INTERNAL MEDICINE

## 2023-04-14 PROCEDURE — 99214 OFFICE O/P EST MOD 30 MIN: CPT | Mod: S$GLB,,, | Performed by: INTERNAL MEDICINE

## 2023-04-14 PROCEDURE — 1126F AMNT PAIN NOTED NONE PRSNT: CPT | Mod: CPTII,S$GLB,, | Performed by: INTERNAL MEDICINE

## 2023-04-14 PROCEDURE — 3074F PR MOST RECENT SYSTOLIC BLOOD PRESSURE < 130 MM HG: ICD-10-PCS | Mod: CPTII,S$GLB,, | Performed by: INTERNAL MEDICINE

## 2023-04-14 PROCEDURE — 3078F DIAST BP <80 MM HG: CPT | Mod: CPTII,S$GLB,, | Performed by: INTERNAL MEDICINE

## 2023-04-14 RX ORDER — FLECAINIDE ACETATE 150 MG/1
150 TABLET ORAL 2 TIMES DAILY
COMMUNITY
Start: 2023-02-16 | End: 2023-06-17 | Stop reason: ALTCHOICE

## 2023-04-14 RX ORDER — LEVOTHYROXINE SODIUM 50 UG/1
50 TABLET ORAL
COMMUNITY
Start: 2023-02-14 | End: 2023-11-01 | Stop reason: DRUGHIGH

## 2023-04-14 RX ORDER — LEVOTHYROXINE SODIUM 50 UG/1
50 TABLET ORAL
COMMUNITY
Start: 2022-11-23 | End: 2023-04-14

## 2023-04-14 NOTE — PROGRESS NOTES
Subjective:   04/14/2023     Patient ID:  Juliana Mayorga is a 72 y.o. female who presents for evaulation of Follow-up      Comes in for follow-up, does have hypertension, generally at home it is fairly well controlled.      She has not had chest pains or tightness, PND or orthopnea.  She does not have a history of coronary artery disease.     Paroxysmal atrial fibrillation is present, it is controlled on flecainide, she did have recurrences, now on higher dose.  She remains on anticoagulation.  Follows with Dr. Platt.    A lipid profile in 2021 showed a total cholesterol 308, ,  and triglycerides 57.  A repeat in mid 2023 showed a total cholesterol of 216, triglycerides 90, HDL is 94 and .    She is anticoagulated with Eliquis, she does not have bleeding problems.    Hypertension  Pertinent negatives include no chest pain, headaches, malaise/fatigue, orthopnea, palpitations, PND or shortness of breath.     Patient Active Problem List   Diagnosis    Insomnia due to medical condition    Primary hypertension    Left ventricular hypertrophy    Malignant neoplasm of tongue    Mitral valve prolapse    Osteoporosis    Paroxysmal atrial fibrillation    Radiation-induced esophageal stricture    Subclinical hypothyroidism    Long-term current use of high risk medication other than anticoagulant    Anticoagulation management encounter    Carotid stenosis, asymptomatic, bilateral    Long term current use of anticoagulant    Pure hypercholesterolemia    Encounter for monitoring flecainide therapy        History reviewed. No pertinent past medical history.      Past Surgical History:   Procedure Laterality Date     egd   once per month at [facility]      BREAST LUMPECTOMY Right 2013    CARDIOVERSION         Review of patient's allergies indicates:   Allergen Reactions    Cefazolin      Other reaction(s): Extreme pain left leg and right arm  Other reaction(s): Extreme pain left leg and right arm          Current Outpatient Medications:     amLODIPine (NORVASC) 10 MG tablet, Take 1 tablet (10 mg total) by mouth nightly., Disp: 90 tablet, Rfl: 3    ELIQUIS 5 mg Tab, Take 1 tablet (5 mg total) by mouth 2 (two) times daily., Disp: 60 tablet, Rfl: 11    esomeprazole (NEXIUM) 40 MG capsule, Take 40 mg by mouth once daily. , Disp: , Rfl:     eszopiclone 3 mg Tab, Take 1 tablet (3 mg total) by mouth every evening., Disp: 90 tablet, Rfl: 1    flecainide (TAMBOCOR) 150 MG Tab, Take 150 mg by mouth 2 (two) times daily., Disp: , Rfl:     irbesartan (AVAPRO) 150 MG tablet, Take 1 tablet (150 mg total) by mouth every evening., Disp: 90 tablet, Rfl: 3    levothyroxine (SYNTHROID) 50 MCG tablet, Take 50 mcg by mouth., Disp: , Rfl:     rosuvastatin (CRESTOR) 20 MG tablet, Take 1 tablet (20 mg total) by mouth every evening., Disp: 90 tablet, Rfl: 3     Social History     Socioeconomic History    Marital status:    Tobacco Use    Smoking status: Former     Types: Cigarettes     Quit date: 2005     Years since quittin.2    Smokeless tobacco: Never   Substance and Sexual Activity    Alcohol use: Yes     Alcohol/week: 3.0 standard drinks     Types: 3 Glasses of wine per week       Family History   Problem Relation Age of Onset    Alzheimer's disease Mother     Cancer Father         Objective:   Review of Systems   Constitutional: Negative for chills, decreased appetite, diaphoresis, fever, malaise/fatigue, weight gain and weight loss.   HENT:  Negative for congestion, hearing loss, nosebleeds and sore throat.    Eyes:  Negative for double vision, vision loss in left eye and vision loss in right eye.   Cardiovascular:  Negative for chest pain, claudication, cyanosis, dyspnea on exertion, irregular heartbeat, leg swelling, near-syncope, orthopnea, palpitations, paroxysmal nocturnal dyspnea and syncope.   Respiratory:  Negative for cough, hemoptysis, shortness of breath, sleep disturbances due to breathing, snoring  and wheezing.    Endocrine: Negative for cold intolerance and heat intolerance.   Hematologic/Lymphatic: Negative for adenopathy and bleeding problem. Does not bruise/bleed easily.   Skin:  Negative for itching, nail changes and rash.   Musculoskeletal:  Negative for arthritis, back pain, falls and myalgias.   Gastrointestinal:  Negative for bloating, abdominal pain, anorexia, constipation, diarrhea, hematochezia, melena, nausea and vomiting.   Genitourinary:  Negative for frequency, hematuria and nocturia.   Neurological:  Negative for focal weakness, headaches, vertigo and weakness.   Psychiatric/Behavioral:  Negative for altered mental status, depression and memory loss.    Allergic/Immunologic: Negative for hives.     Vitals:    04/14/23 1338   BP: 120/70   Pulse: 70         Physical Exam  Vitals reviewed.   Constitutional:       General: She is not in acute distress.     Appearance: She is well-developed.   HENT:      Head: Normocephalic and atraumatic.      Nose: Nose normal.   Eyes:      Conjunctiva/sclera: Conjunctivae normal.      Pupils: Pupils are equal, round, and reactive to light.   Neck:      Vascular: Carotid bruit (left greater than right) present. No JVD.   Cardiovascular:      Rate and Rhythm: Normal rate and regular rhythm.      Pulses: Normal pulses and intact distal pulses.      Heart sounds: Murmur (Grade 2 systolic ejection murmur) heard.     No friction rub. No gallop.      Comments: Karlos venous pressure is normal  Pulmonary:      Effort: Pulmonary effort is normal. No respiratory distress.      Breath sounds: Normal breath sounds. No wheezing or rales.   Chest:      Chest wall: No tenderness.   Abdominal:      General: Bowel sounds are normal. There is no distension.      Palpations: Abdomen is soft.      Tenderness: There is no abdominal tenderness.   Musculoskeletal:         General: No tenderness or deformity. Normal range of motion.      Cervical back: Normal range of motion and neck  supple.      Right lower leg: Edema ( Trace) present.      Left lower leg: Edema (Trace) present.   Skin:     General: Skin is warm and dry.      Findings: No erythema or rash.   Neurological:      Mental Status: She is alert and oriented to person, place, and time.      Cranial Nerves: No cranial nerve deficit.      Motor: No abnormal muscle tone.      Coordination: Coordination normal.   Psychiatric:         Behavior: Behavior normal.         Thought Content: Thought content normal.         Judgment: Judgment normal.         Assessment and Plan:     Primary hypertension  Comments:  Blood pressure has been elevated here, but shows me her values from home which are always excellent, no changes therapy    Systolic murmur  Comments:  ECHO in 6 months  Orders:  -     Cancel: Echo; Future; Expected date: 04/21/2023  -     Echo; Future; Expected date: 10/14/2023    Paroxysmal atrial fibrillation  Comments:  Well controlled on flecainide    Pure hypercholesterolemia  Comments:  On high-dose statin therapy    Anticoagulation management encounter  Comments:  No bleeding problems    Carotid stenosis, asymptomatic, bilateral  Comments:  Carotid ultrasound in 6 months  Orders:  -     CV Ultrasound Bilateral Doppler Carotid; Future; Expected date: 10/14/2023             Follow up in about 6 months (around 10/14/2023).

## 2023-05-09 DIAGNOSIS — I10 PRIMARY HYPERTENSION: ICD-10-CM

## 2023-05-09 RX ORDER — AMLODIPINE BESYLATE 10 MG/1
10 TABLET ORAL NIGHTLY
Qty: 90 TABLET | Refills: 3 | Status: SHIPPED | OUTPATIENT
Start: 2023-05-09

## 2023-05-09 RX ORDER — IRBESARTAN 150 MG/1
150 TABLET ORAL NIGHTLY
Qty: 90 TABLET | Refills: 3 | Status: SHIPPED | OUTPATIENT
Start: 2023-05-09

## 2023-06-07 ENCOUNTER — TELEPHONE (OUTPATIENT)
Dept: CARDIOLOGY | Facility: CLINIC | Age: 73
End: 2023-06-07
Payer: MEDICARE

## 2023-06-07 DIAGNOSIS — I48.0 PAROXYSMAL ATRIAL FIBRILLATION: Primary | ICD-10-CM

## 2023-06-07 NOTE — TELEPHONE ENCOUNTER
Patient now with recurrent atrial fibrillation, she would like a 2nd opinion as to the need for ablation.  Will arrange.

## 2023-06-08 DIAGNOSIS — I49.8 OTHER CARDIAC ARRHYTHMIA: Primary | ICD-10-CM

## 2023-06-14 ENCOUNTER — TELEPHONE (OUTPATIENT)
Dept: ELECTROPHYSIOLOGY | Facility: CLINIC | Age: 73
End: 2023-06-14
Payer: MEDICARE

## 2023-06-15 ENCOUNTER — HOSPITAL ENCOUNTER (OUTPATIENT)
Dept: CARDIOLOGY | Facility: CLINIC | Age: 73
Discharge: HOME | End: 2023-06-15
Payer: MEDICARE

## 2023-06-15 ENCOUNTER — OFFICE VISIT (OUTPATIENT)
Dept: ELECTROPHYSIOLOGY | Facility: CLINIC | Age: 73
End: 2023-06-15
Payer: MEDICARE

## 2023-06-15 VITALS
DIASTOLIC BLOOD PRESSURE: 70 MMHG | HEART RATE: 71 BPM | WEIGHT: 183.44 LBS | HEIGHT: 69 IN | BODY MASS INDEX: 27.17 KG/M2 | SYSTOLIC BLOOD PRESSURE: 140 MMHG

## 2023-06-15 DIAGNOSIS — I49.8 OTHER CARDIAC ARRHYTHMIA: ICD-10-CM

## 2023-06-15 DIAGNOSIS — E78.00 PURE HYPERCHOLESTEROLEMIA: ICD-10-CM

## 2023-06-15 DIAGNOSIS — I51.7 LEFT VENTRICULAR HYPERTROPHY: ICD-10-CM

## 2023-06-15 DIAGNOSIS — Z79.01 LONG TERM CURRENT USE OF ANTICOAGULANT: ICD-10-CM

## 2023-06-15 DIAGNOSIS — E66.3 OVERWEIGHT (BMI 25.0-29.9): ICD-10-CM

## 2023-06-15 DIAGNOSIS — M81.0 AGE-RELATED OSTEOPOROSIS WITHOUT CURRENT PATHOLOGICAL FRACTURE: ICD-10-CM

## 2023-06-15 DIAGNOSIS — I45.10 RBBB: ICD-10-CM

## 2023-06-15 DIAGNOSIS — I34.0 NONRHEUMATIC MITRAL VALVE REGURGITATION: ICD-10-CM

## 2023-06-15 DIAGNOSIS — N18.30 STAGE 3 CHRONIC KIDNEY DISEASE, UNSPECIFIED WHETHER STAGE 3A OR 3B CKD: ICD-10-CM

## 2023-06-15 DIAGNOSIS — K22.2 RADIATION-INDUCED ESOPHAGEAL STRICTURE: ICD-10-CM

## 2023-06-15 DIAGNOSIS — I36.1 NONRHEUMATIC TRICUSPID VALVE REGURGITATION: ICD-10-CM

## 2023-06-15 DIAGNOSIS — I48.19 PERSISTENT ATRIAL FIBRILLATION: Primary | ICD-10-CM

## 2023-06-15 DIAGNOSIS — I65.23 CAROTID STENOSIS, ASYMPTOMATIC, BILATERAL: ICD-10-CM

## 2023-06-15 DIAGNOSIS — E03.8 SUBCLINICAL HYPOTHYROIDISM: ICD-10-CM

## 2023-06-15 DIAGNOSIS — I10 PRIMARY HYPERTENSION: ICD-10-CM

## 2023-06-15 DIAGNOSIS — I51.89 GRADE III DIASTOLIC DYSFUNCTION: ICD-10-CM

## 2023-06-15 DIAGNOSIS — C02.9 MALIGNANT NEOPLASM OF TONGUE: ICD-10-CM

## 2023-06-15 PROCEDURE — 3078F PR MOST RECENT DIASTOLIC BLOOD PRESSURE < 80 MM HG: ICD-10-PCS | Mod: CPTII,S$GLB,, | Performed by: STUDENT IN AN ORGANIZED HEALTH CARE EDUCATION/TRAINING PROGRAM

## 2023-06-15 PROCEDURE — 3008F PR BODY MASS INDEX (BMI) DOCUMENTED: ICD-10-PCS | Mod: CPTII,S$GLB,, | Performed by: STUDENT IN AN ORGANIZED HEALTH CARE EDUCATION/TRAINING PROGRAM

## 2023-06-15 PROCEDURE — 3078F DIAST BP <80 MM HG: CPT | Mod: CPTII,S$GLB,, | Performed by: STUDENT IN AN ORGANIZED HEALTH CARE EDUCATION/TRAINING PROGRAM

## 2023-06-15 PROCEDURE — 3008F BODY MASS INDEX DOCD: CPT | Mod: CPTII,S$GLB,, | Performed by: STUDENT IN AN ORGANIZED HEALTH CARE EDUCATION/TRAINING PROGRAM

## 2023-06-15 PROCEDURE — 4010F ACE/ARB THERAPY RXD/TAKEN: CPT | Mod: CPTII,S$GLB,, | Performed by: STUDENT IN AN ORGANIZED HEALTH CARE EDUCATION/TRAINING PROGRAM

## 2023-06-15 PROCEDURE — 99999 PR PBB SHADOW E&M-EST. PATIENT-LVL III: CPT | Mod: PBBFAC,,, | Performed by: STUDENT IN AN ORGANIZED HEALTH CARE EDUCATION/TRAINING PROGRAM

## 2023-06-15 PROCEDURE — 93010 RHYTHM STRIP: ICD-10-PCS | Mod: S$GLB,,, | Performed by: INTERNAL MEDICINE

## 2023-06-15 PROCEDURE — 3077F SYST BP >= 140 MM HG: CPT | Mod: CPTII,S$GLB,, | Performed by: STUDENT IN AN ORGANIZED HEALTH CARE EDUCATION/TRAINING PROGRAM

## 2023-06-15 PROCEDURE — 1101F PR PT FALLS ASSESS DOC 0-1 FALLS W/OUT INJ PAST YR: ICD-10-PCS | Mod: CPTII,S$GLB,, | Performed by: STUDENT IN AN ORGANIZED HEALTH CARE EDUCATION/TRAINING PROGRAM

## 2023-06-15 PROCEDURE — 3288F PR FALLS RISK ASSESSMENT DOCUMENTED: ICD-10-PCS | Mod: CPTII,S$GLB,, | Performed by: STUDENT IN AN ORGANIZED HEALTH CARE EDUCATION/TRAINING PROGRAM

## 2023-06-15 PROCEDURE — 1126F PR PAIN SEVERITY QUANTIFIED, NO PAIN PRESENT: ICD-10-PCS | Mod: CPTII,S$GLB,, | Performed by: STUDENT IN AN ORGANIZED HEALTH CARE EDUCATION/TRAINING PROGRAM

## 2023-06-15 PROCEDURE — 99999 PR PBB SHADOW E&M-EST. PATIENT-LVL III: ICD-10-PCS | Mod: PBBFAC,,, | Performed by: STUDENT IN AN ORGANIZED HEALTH CARE EDUCATION/TRAINING PROGRAM

## 2023-06-15 PROCEDURE — 93005 ELECTROCARDIOGRAM TRACING: CPT | Mod: S$GLB,,, | Performed by: STUDENT IN AN ORGANIZED HEALTH CARE EDUCATION/TRAINING PROGRAM

## 2023-06-15 PROCEDURE — 99205 PR OFFICE/OUTPT VISIT, NEW, LEVL V, 60-74 MIN: ICD-10-PCS | Mod: S$GLB,,, | Performed by: STUDENT IN AN ORGANIZED HEALTH CARE EDUCATION/TRAINING PROGRAM

## 2023-06-15 PROCEDURE — 4010F PR ACE/ARB THEARPY RXD/TAKEN: ICD-10-PCS | Mod: CPTII,S$GLB,, | Performed by: STUDENT IN AN ORGANIZED HEALTH CARE EDUCATION/TRAINING PROGRAM

## 2023-06-15 PROCEDURE — 1159F MED LIST DOCD IN RCRD: CPT | Mod: CPTII,S$GLB,, | Performed by: STUDENT IN AN ORGANIZED HEALTH CARE EDUCATION/TRAINING PROGRAM

## 2023-06-15 PROCEDURE — 3288F FALL RISK ASSESSMENT DOCD: CPT | Mod: CPTII,S$GLB,, | Performed by: STUDENT IN AN ORGANIZED HEALTH CARE EDUCATION/TRAINING PROGRAM

## 2023-06-15 PROCEDURE — 93005 RHYTHM STRIP: ICD-10-PCS | Mod: S$GLB,,, | Performed by: STUDENT IN AN ORGANIZED HEALTH CARE EDUCATION/TRAINING PROGRAM

## 2023-06-15 PROCEDURE — 99205 OFFICE O/P NEW HI 60 MIN: CPT | Mod: S$GLB,,, | Performed by: STUDENT IN AN ORGANIZED HEALTH CARE EDUCATION/TRAINING PROGRAM

## 2023-06-15 PROCEDURE — 3077F PR MOST RECENT SYSTOLIC BLOOD PRESSURE >= 140 MM HG: ICD-10-PCS | Mod: CPTII,S$GLB,, | Performed by: STUDENT IN AN ORGANIZED HEALTH CARE EDUCATION/TRAINING PROGRAM

## 2023-06-15 PROCEDURE — 1101F PT FALLS ASSESS-DOCD LE1/YR: CPT | Mod: CPTII,S$GLB,, | Performed by: STUDENT IN AN ORGANIZED HEALTH CARE EDUCATION/TRAINING PROGRAM

## 2023-06-15 PROCEDURE — 93010 ELECTROCARDIOGRAM REPORT: CPT | Mod: S$GLB,,, | Performed by: INTERNAL MEDICINE

## 2023-06-15 PROCEDURE — 1159F PR MEDICATION LIST DOCUMENTED IN MEDICAL RECORD: ICD-10-PCS | Mod: CPTII,S$GLB,, | Performed by: STUDENT IN AN ORGANIZED HEALTH CARE EDUCATION/TRAINING PROGRAM

## 2023-06-15 PROCEDURE — 1126F AMNT PAIN NOTED NONE PRSNT: CPT | Mod: CPTII,S$GLB,, | Performed by: STUDENT IN AN ORGANIZED HEALTH CARE EDUCATION/TRAINING PROGRAM

## 2023-06-15 NOTE — Clinical Note
Ángela Rm,   I am stopping this patient's flecainide now, and will plan for about a 2 week washout. We will then need to get her back for a DINORA/cardioversion with amiodarone to start at that time.  Thank you!

## 2023-06-17 PROBLEM — I45.10 RBBB: Status: ACTIVE | Noted: 2023-06-17

## 2023-06-17 PROBLEM — N18.30 CKD (CHRONIC KIDNEY DISEASE), STAGE III: Status: ACTIVE | Noted: 2023-06-17

## 2023-06-17 NOTE — PROGRESS NOTES
Electrophysiology Clinic Note    Reason for new patient visit: Evaluation and recommendations regarding persistent atrial fibrillation.     PRESENTING HISTORY:     History of Present Illness:  Ms. Juliana Mayorga is a cecilio 72-year-old woman who presents today for evaluation and recommendations regarding persistent atrial fibrillation. She has a past medical history significant for persistent atrial fibrillation, baseline RBBB, hypertension, hyperlipidemia, bilateral carotid artery stenosis, moderate mitral regurgitation, moderate tricuspid regurgitation, grade III diastolic dysfunction, CKD stage III, osteoporosis, a history of a malignant neoplasm of the tongue with radiation induced esophageal stricture, hypothyroidism, and overweight BMI.     She has been followed for several years with EP at [facility] with Dr. Platt. Per her report, she was originally diagnosed with atrial fibrillation in 2019, but reports that she had been experiencing episodes of palpitations and racing heart rates since approximately 2015. Dr. Platt initiated antiarrhythmic therapy with flecainide 100mg po BID, and when she remained in persistent atrial fibrillation with baseline RBBB QRSd of ~200ms, he increased her dose to flecainide 150mg po BID. She was started on apixaban for oral anticoagulation and denies any adverse bleeding events. She has a history of bilateral carotid artery stenosis, with preserved systolic function, LVEF 65% with GIIIDD. She underwent a DINORA/cardioversion with Dr. Platt in early January, 2023, and reports that she almost immediately went back into atrial fibrillation. She is followed in general cardiology with Dr. Fernandez and would like to transition her care into the [facility].       She presents to clinic today with her . In discussion with Ms. Mayorga today, she tells me that she is feeling overall quite well. She has a [employer] mobile dread that has informed her  that she has remained in atrial fibrillation since at least 5/7/2023. She denies any potential triggers of this episode, and notes that she may have been in atrial fibrillation for longer than this time period. She reports symptoms of mild exercise intolerance, palpitations, slightly worsened shortness of breath, and generalized fatigue while in atrial fibrillation. She denies any episodes of dizziness, lightheadedness, syncope/presyncope, chest pain or chest discomfort, nausea or vomiting, orthopnea, or PND. She and her  travel often and are anticipating going to [place] later this summer. She reports baseline left hearing loss. She reports baseline shortness of breath and dyspnea with exertion that are slightly worse while in persistent atrial fibrillation. She can climb one flight of stairs prior to needing to take a break, and continues to attempt to increase her level of physical activity.     Review of Systems:  Review of Systems   Constitutional:  Positive for activity change and fatigue.        Mild exercise intolerance while in atrial fibrillation.    HENT:  Positive for hearing loss. Negative for nasal congestion, nosebleeds, postnasal drip, rhinorrhea, sinus pressure/congestion, sneezing and sore throat.         Chronic left-sided hearing loss.   Respiratory:  Positive for shortness of breath. Negative for apnea, cough, chest tightness and wheezing.    Cardiovascular:  Positive for palpitations. Negative for chest pain and leg swelling.   Gastrointestinal:  Negative for abdominal distention, abdominal pain, anal bleeding, blood in stool, change in bowel habit, constipation, diarrhea, nausea, vomiting and change in bowel habit.   Genitourinary:  Negative for dysuria and hematuria.   Musculoskeletal:  Positive for arthralgias and back pain. Negative for gait problem.   Neurological:  Negative for dizziness, seizures, syncope, weakness, light-headedness, headaches, coordination difficulties and  coordination difficulties.      PAST HISTORY:     Past Medical History:  - Persistent atrial fibrillation  - Baseline RBBB  - Hypertension  - Hyperlipidemia  - Bilateral carotid artery stenosis  - Moderate mitral regurgitation  - Moderate tricuspid regurgitation  - Grade III diastolic dysfunction  - CKD stage III  - Osteoporosis  - History of a malignant neoplasm of the tongue with radiation induced esophageal stricture  - Hypothyroidism  - Overweight BMI      Past Surgical History:   Procedure Laterality Date     egd   once per month at [facility]      BREAST LUMPECTOMY Right 2013    CARDIOVERSION         Family History:  Family History   Problem Relation Age of Onset    Alzheimer's disease Mother     Cancer Father        Social History:  She  reports that she quit smoking about 18 years ago. Her smoking use included cigarettes. She has never used smokeless tobacco. She reports current alcohol use of about 3.0 standard drinks per week. No history on file for drug use.      MEDICATIONS & ALLERGIES:     Review of patient's allergies indicates:   Allergen Reactions    Cefazolin      Other reaction(s): Extreme pain left leg and right arm  Other reaction(s): Extreme pain left leg and right arm       Current Outpatient Medications on File Prior to Visit   Medication Sig Dispense Refill    amLODIPine (NORVASC) 10 MG tablet Take 1 tablet (10 mg total) by mouth nightly. 90 tablet 3    ELIQUIS 5 mg Tab Take 1 tablet (5 mg total) by mouth 2 (two) times daily. 60 tablet 11    esomeprazole (NEXIUM) 40 MG capsule Take 40 mg by mouth once daily.       eszopiclone 3 mg Tab Take 1 tablet (3 mg total) by mouth every evening. 90 tablet 1    flecainide (TAMBOCOR) 150 MG Tab Take 150 mg by mouth 2 (two) times daily.      irbesartan (AVAPRO) 150 MG tablet Take 1 tablet (150 mg total) by mouth every evening. 90 tablet 3    levothyroxine (SYNTHROID) 50 MCG tablet Take 50 mcg by mouth.      rosuvastatin (CRESTOR) 20 MG tablet Take 1 tablet (20  "mg total) by mouth every evening. 90 tablet 3     No current facility-administered medications on file prior to visit.        OBJECTIVE:     Vital Signs:  BP (!) 140/70   Pulse 71   Ht 5' 9" (1.753 m)   Wt 83.2 kg (183 lb 6.8 oz)   BMI 27.09 kg/m²     Physical Exam:  Physical Exam  Constitutional:       General: She is not in acute distress.     Appearance: Normal appearance. She is not ill-appearing or diaphoretic.      Comments: Well-appearing woman in NAD.   HENT:      Head: Normocephalic and atraumatic.      Nose: Nose normal.      Mouth/Throat:      Mouth: Mucous membranes are moist.      Pharynx: Oropharynx is clear.   Eyes:      Pupils: Pupils are equal, round, and reactive to light.   Cardiovascular:      Rate and Rhythm: Normal rate. Rhythm irregular.      Pulses: Normal pulses.      Heart sounds: Murmur heard.     No friction rub. No gallop.      Comments: Irregularly irregular rhythm on right radial artery palpation. II/VI HSM best appreciate at the L[facility]B.  Pulmonary:      Effort: Pulmonary effort is normal. No respiratory distress.      Breath sounds: Normal breath sounds. No wheezing, rhonchi or rales.   Chest:      Chest wall: No tenderness.   Abdominal:      General: There is no distension.      Palpations: Abdomen is soft.      Tenderness: There is no abdominal tenderness.   Musculoskeletal:         General: No swelling or tenderness.      Cervical back: Normal range of motion.      Right lower leg: No edema.      Left lower leg: No edema.   Skin:     General: Skin is warm and dry.      Findings: No erythema, lesion or rash.   Neurological:      General: No focal deficit present.      Mental Status: She is alert and oriented to person, place, and time. Mental status is at baseline.      Motor: No weakness.      Gait: Gait normal.   Psychiatric:         Mood and Affect: Mood normal.         Behavior: Behavior normal.        Laboratory Data:  Lab Results   Component Value Date    WBC 6.4 06/21/2022 "    HGB 13.5 06/21/2022    HCT 41.3 06/21/2022    MCV 95.8 06/21/2022     06/21/2022     Lab Results   Component Value Date    GLU 97 07/13/2022     07/13/2022    K 5.0 07/13/2022     07/13/2022    CO2 23 07/13/2022    BUN 22.0 (H) 07/13/2022    CREATININE 1.18 (H) 07/13/2022    CALCIUM 10.0 07/13/2022    MG 1.5 (L) 06/21/2022     No results found for: INR, PROTIME    Pertinent Cardiac Data:  ECG: Atrial fibrillation with rate of 71 bpm, RBBB with QRS ~200 ms, QT/QTc 488/530 ms.     Bilateral Carotid Artery Duplex Ultrasound - 3/19/2021:  There is 50-59% right Internal Carotid Stenosis.  >50% right common carotid stenosis.  >50% right ECA stenosis.  There is 20-39% left Internal Carotid Stenosis.  Possible nonobstructive ulcer noted in left common carotid.    Resting 2D Transthoracic Echocardiogram - 5/23/2022:  The left ventricle is normal in size with concentric remodeling and normal systolic function.  The estimated ejection fraction is 65%.  Moderate to severe left atrial enlargement.  Grade III left ventricular diastolic dysfunction.  There is pulmonary hypertension.  The estimated PA systolic pressure is 55 mmHg.  Normal right ventricular size with normal right ventricular systolic function.  Normal central venous pressure (3 mmHg).  Mild right atrial enlargement.  Mild-to-moderate mitral regurgitation.  Moderate tricuspid regurgitation.       ASSESSMENT & PLAN:   Ms. Juliana Mayorga is a cecilio 72-year-old woman who presents today for evaluation and recommendations regarding persistent atrial fibrillation. She has a past medical history significant for persistent atrial fibrillation, baseline RBBB, hypertension, hyperlipidemia, bilateral carotid artery stenosis, moderate mitral regurgitation, moderate tricuspid regurgitation, grade III diastolic dysfunction, CKD stage III, osteoporosis, a history of a malignant neoplasm of the tongue with radiation induced esophageal stricture,  hypothyroidism, and overweight BMI.     - We discussed the pathophysiology of atrial fibrillation; specifically, we discussed persistent atrial fibrillation and the concept that the longer a patient remains in atrial fibrillation, the more challenging rhythm restoration and maintenance of sinus rhythm may become. We discussed that atrial fibrillation has an increased risk of CVA.  - She remains in persistent atrial fibrillation on examination today, and per her [employer] mobile dread has remained in atrial fibrillation since 5/7/2023. She has severe left atrial enlargement on echocardiogram. We discussed performing another DINORA/cardioversion for an attempt at rhythm restoration. This procedure was described in detail, in addition to potential risks, benefits, and alternative options. Ms. Mayorga voices understanding and is in agreement, and informed consent was obtained.  - We will discontinue her flecainide now. She is not a candidate for class Ic antiarrhythmic agents given her history of carotid artery stenosis, GIIIDD, and baseline RBBB with QRSd of ~200ms. She is not a candidate for class III antiarrhythmics in the setting of CKD stage III. We will plan to stop her flecainide now, allow a 2 week washout period, and have her return to undergo a DINORA/cardioversion. We will then plan to start amiodarone therapy for antiarrhythmic.   - She is presently not maintained on rate control; this will be reevaluated following her cardioversion.   - Her [id number] is 4 (HTN, carotid artery disease, female gender, age 65-74), portending an annual adjusted risk of CVA of 4%. She remains on uninterrupted apixaban therapy with no bleeding events reported.   - We discussed the possibility of catheter ablation with pulmonary vein isolation should she continue to have symptoms and AF despite AAD therapy. She voices understanding, although she would like to attempt rhythm control with medication at this time. We discussed that in  persistent atrial fibrillation, the success rates with radiofrequency catheter ablation are reduced.     - Possible underlying drivers of atrial fibrillation were addressed at this appointment, including recommendations for maintenance of a healthy BMI - now a class I recommendation. Review of laboratory data revealed elevated TSH at 6.51, with no FT4 on record. We will assess this with her next laboratory draw. A request for sleep study was placed today to evaluate for possible underlying obstructive sleep apnea.      This patient will present to the EP laboratory to undergo a DINORA/cardioversion with initiation of amiodarone for antiarrhythmic therapy and continued apixaban oral anticoagulation. All questions and concerns were addressed at this encounter.     Signing Physician:       JEREMY Reagan MD  Electrophysiology Attending

## 2023-06-19 ENCOUNTER — TELEPHONE (OUTPATIENT)
Dept: ELECTROPHYSIOLOGY | Facility: CLINIC | Age: 73
End: 2023-06-19
Payer: MEDICARE

## 2023-06-19 DIAGNOSIS — E78.00 PURE HYPERCHOLESTEROLEMIA: ICD-10-CM

## 2023-06-19 DIAGNOSIS — I48.19 PERSISTENT ATRIAL FIBRILLATION: Primary | ICD-10-CM

## 2023-06-19 DIAGNOSIS — R09.89 BRUIT OF LEFT CAROTID ARTERY: ICD-10-CM

## 2023-06-19 RX ORDER — ROSUVASTATIN CALCIUM 20 MG/1
20 TABLET, COATED ORAL NIGHTLY
Qty: 90 TABLET | Refills: 3 | Status: SHIPPED | OUTPATIENT
Start: 2023-06-19 | End: 2024-06-18

## 2023-06-19 NOTE — TELEPHONE ENCOUNTER
Spoke with patient. She thinks her Kardia was misreading her HR and today her HR is in the 80's. Advised that we expect she will likely be back in AF persistently once the flecainide washes out of her system. We did schedule the DINORA/DCCV for 7/6/2023, per Dr Reaagn's note.

## 2023-06-19 NOTE — TELEPHONE ENCOUNTER
----- Message from Freya Rowe RN sent at 6/19/2023 11:04 AM CDT -----  Regarding: FW: medication    ----- Message -----  From: Katherin Morris  Sent: 6/19/2023  10:42 AM CDT  To: Freya Rowe RN  Subject: medication                                       Pls call pt at [phone].  She saw Dr. Reagan on 6/15/23 and was taken off of flecainide and since then she says her hr has been running high around 100.  She took a Metoprolol and it went down to 86.  She also state that her at home ekg showed that she is in afib.    Thank you

## 2023-06-26 DIAGNOSIS — I10 ESSENTIAL HYPERTENSION: Chronic | ICD-10-CM

## 2023-06-26 RX ORDER — APIXABAN 5 MG/1
5 TABLET, FILM COATED ORAL 2 TIMES DAILY
Qty: 60 TABLET | Refills: 11 | Status: SHIPPED | OUTPATIENT
Start: 2023-06-26 | End: 2023-06-29 | Stop reason: SDUPTHER

## 2023-06-29 ENCOUNTER — TELEPHONE (OUTPATIENT)
Dept: ELECTROPHYSIOLOGY | Facility: CLINIC | Age: 73
End: 2023-06-29
Payer: MEDICARE

## 2023-06-29 DIAGNOSIS — I10 ESSENTIAL HYPERTENSION: Chronic | ICD-10-CM

## 2023-06-29 RX ORDER — APIXABAN 5 MG/1
5 TABLET, FILM COATED ORAL 2 TIMES DAILY
Qty: 60 TABLET | Refills: 11 | Status: SHIPPED | OUTPATIENT
Start: 2023-06-29 | End: 2024-01-03 | Stop reason: SDUPTHER

## 2023-06-29 RX ORDER — APIXABAN 5 MG/1
5 TABLET, FILM COATED ORAL 2 TIMES DAILY
Qty: 60 TABLET | Refills: 11 | Status: SHIPPED | OUTPATIENT
Start: 2023-06-29 | End: 2023-06-29 | Stop reason: SDUPTHER

## 2023-06-29 NOTE — TELEPHONE ENCOUNTER
Spoke with patient. She is scheduled for DINORA/DCCV with Dr Reagan on 7/6/2023 but thinks she may be back in SR. Will get EKG on 7/5/2023 and Cx if she is in SR.

## 2023-06-29 NOTE — TELEPHONE ENCOUNTER
----- Message from [place] MA sent at 6/29/2023 11:39 AM CDT -----  The patient would like  to talk to the nurse about her procedure that schedule please call [phone]. Thank you.

## 2023-07-05 ENCOUNTER — HOSPITAL ENCOUNTER (OUTPATIENT)
Dept: CARDIOLOGY | Facility: CLINIC | Age: 73
Discharge: HOME | End: 2023-07-05
Payer: MEDICARE

## 2023-07-05 ENCOUNTER — TELEPHONE (OUTPATIENT)
Dept: ELECTROPHYSIOLOGY | Facility: CLINIC | Age: 73
End: 2023-07-05
Payer: MEDICARE

## 2023-07-05 DIAGNOSIS — I48.19 PERSISTENT ATRIAL FIBRILLATION: ICD-10-CM

## 2023-07-05 PROBLEM — I48.11 LONGSTANDING PERSISTENT ATRIAL FIBRILLATION: Status: ACTIVE | Noted: 2023-07-05

## 2023-07-05 PROCEDURE — 93010 RHYTHM STRIP: ICD-10-PCS | Mod: S$GLB,,, | Performed by: INTERNAL MEDICINE

## 2023-07-05 PROCEDURE — 93005 ELECTROCARDIOGRAM TRACING: CPT | Mod: S$GLB,,, | Performed by: STUDENT IN AN ORGANIZED HEALTH CARE EDUCATION/TRAINING PROGRAM

## 2023-07-05 PROCEDURE — 93005 RHYTHM STRIP: ICD-10-PCS | Mod: S$GLB,,, | Performed by: STUDENT IN AN ORGANIZED HEALTH CARE EDUCATION/TRAINING PROGRAM

## 2023-07-05 PROCEDURE — 93010 ELECTROCARDIOGRAM REPORT: CPT | Mod: S$GLB,,, | Performed by: INTERNAL MEDICINE

## 2023-07-05 NOTE — TELEPHONE ENCOUNTER
Spoke to Ms. Andrews    CONFIRMED procedure arrival time of 0700    Reiterated instructions including:  -Directions to check in desk  -NPO after midnight night prior to procedure  -Pre-procedure LABS done, no alerts  -Confirmed no fever, cough, or shortness of breath    Patient verbalized understanding of above and appreciated the call.

## 2023-07-05 NOTE — HPI
Juliana Mayorga 72 y.o. female presents for DINORA/DCCV.  Patient has a history of persistent atrial fibrillation, baseline RBBB, hypertension, hyperlipidemia, bilateral carotid artery stenosis, moderate mitral regurgitation, moderate tricuspid regurgitation, grade III diastolic dysfunction, CKD stage III, osteoporosis, a history of a malignant neoplasm of the tongue with radiation induced esophageal stricture, hypothyroidism, and overweight BMI.      She has been followed for several years with EP at [facility] with Dr. Platt. Per her report, she was originally diagnosed with atrial fibrillation in 2019, but reports that she had been experiencing episodes of palpitations and racing heart rates since approximately 2015. Dr. Platt initiated antiarrhythmic therapy with flecainide 100mg po BID, and when she remained in persistent atrial fibrillation with baseline RBBB QRSd of ~200ms, he increased her dose to flecainide 150mg po BID. She was started on apixaban for oral anticoagulation and denies any adverse bleeding events. She has a history of bilateral carotid artery stenosis, with preserved systolic function, LVEF 65% with GIIIDD. She underwent a DINORA/cardioversion with Dr. Platt in early January, 2023, and reports that she almost immediately went back into atrial fibrillation. She is followed in general cardiology with Dr. Fernandez and is transitionin her care into the [facility].

## 2023-07-06 ENCOUNTER — HOSPITAL ENCOUNTER (OUTPATIENT)
Facility: HOSPITAL | Age: 73
Discharge: HOME | End: 2023-07-06
Attending: STUDENT IN AN ORGANIZED HEALTH CARE EDUCATION/TRAINING PROGRAM | Admitting: STUDENT IN AN ORGANIZED HEALTH CARE EDUCATION/TRAINING PROGRAM
Payer: MEDICARE

## 2023-07-06 VITALS
DIASTOLIC BLOOD PRESSURE: 74 MMHG | HEART RATE: 75 BPM | WEIGHT: 175 LBS | RESPIRATION RATE: 18 BRPM | OXYGEN SATURATION: 95 % | TEMPERATURE: 98 F | BODY MASS INDEX: 25.92 KG/M2 | HEIGHT: 69 IN | SYSTOLIC BLOOD PRESSURE: 149 MMHG

## 2023-07-06 DIAGNOSIS — I48.19 PERSISTENT ATRIAL FIBRILLATION: ICD-10-CM

## 2023-07-06 DIAGNOSIS — Z79.01 LONG TERM CURRENT USE OF ANTICOAGULANT: ICD-10-CM

## 2023-07-06 DIAGNOSIS — I48.91 ATRIAL FIBRILLATION: Primary | ICD-10-CM

## 2023-07-06 PROCEDURE — 99499 NO LOS: ICD-10-PCS | Mod: ,,, | Performed by: STUDENT IN AN ORGANIZED HEALTH CARE EDUCATION/TRAINING PROGRAM

## 2023-07-06 PROCEDURE — 93010 EKG 12-LEAD: ICD-10-PCS | Mod: ,,, | Performed by: INTERNAL MEDICINE

## 2023-07-06 PROCEDURE — 93010 ELECTROCARDIOGRAM REPORT: CPT | Mod: ,,, | Performed by: INTERNAL MEDICINE

## 2023-07-06 PROCEDURE — 93005 ELECTROCARDIOGRAM TRACING: CPT

## 2023-07-06 PROCEDURE — 99499 UNLISTED E&M SERVICE: CPT | Mod: ,,, | Performed by: STUDENT IN AN ORGANIZED HEALTH CARE EDUCATION/TRAINING PROGRAM

## 2023-07-06 RX ORDER — FENTANYL CITRATE 50 UG/ML
25 INJECTION, SOLUTION INTRAMUSCULAR; INTRAVENOUS EVERY 5 MIN PRN
Status: CANCELLED | OUTPATIENT
Start: 2023-07-06

## 2023-07-06 RX ORDER — AMIODARONE HYDROCHLORIDE 200 MG/1
200 TABLET ORAL DAILY
Qty: 90 TABLET | Refills: 2 | Status: SHIPPED | OUTPATIENT
Start: 2023-08-04

## 2023-07-06 RX ORDER — ONDANSETRON 2 MG/ML
4 INJECTION INTRAMUSCULAR; INTRAVENOUS DAILY PRN
Status: CANCELLED | OUTPATIENT
Start: 2023-07-06

## 2023-07-06 RX ORDER — AMIODARONE HYDROCHLORIDE 200 MG/1
TABLET ORAL
Qty: 72 TABLET | Refills: 0 | Status: SHIPPED | OUTPATIENT
Start: 2023-07-06 | End: 2023-07-21

## 2023-07-06 NOTE — HOSPITAL COURSE
Presents for DINORA/DCCV  for atrial fibrillation. Presenting EKG: Sinus rhythm; ventricular rate: 88 bpm. Procedures canceled.   D/c instructions provided to patient, specifically to continue anticoagulant Eliquis and start amiodarone load.  Patient and  verbalized understanding.

## 2023-07-06 NOTE — H&P
[facility] - Short Stay Cardiac Unit  Cardiac Electrophysiology  History and Physical     Admission Date: 7/6/2023  Code Status: No Order   Attending Provider: TYRELL Reagan MD   Principal Problem:Longstanding persistent atrial fibrillation    Subjective:     Chief Complaint:  Presents for DINORA/DCCV     HPI:  Juliana Mayorga 72 y.o. female presents for DINORA/DCCV.  Patient has a history of persistent atrial fibrillation, baseline RBBB, hypertension, hyperlipidemia, bilateral carotid artery stenosis, moderate mitral regurgitation, moderate tricuspid regurgitation, grade III diastolic dysfunction, CKD stage III, osteoporosis, a history of a malignant neoplasm of the tongue with radiation induced esophageal stricture, hypothyroidism, and overweight BMI.      She has been followed for several years with EP at [facility] with Dr. Platt. Per her report, she was originally diagnosed with atrial fibrillation in 2019, but reports that she had been experiencing episodes of palpitations and racing heart rates since approximately 2015. Dr. Platt initiated antiarrhythmic therapy with flecainide 100mg po BID, and when she remained in persistent atrial fibrillation with baseline RBBB QRSd of ~200ms, he increased her dose to flecainide 150mg po BID. She was started on apixaban for oral anticoagulation and denies any adverse bleeding events. She has a history of bilateral carotid artery stenosis, with preserved systolic function, LVEF 65% with GIIIDD. She underwent a DINORA/cardioversion with Dr. Platt in early January, 2023, and reports that she almost immediately went back into atrial fibrillation. She is followed in general cardiology with Dr. Fernandez and is transitionin her care into the [facility].         Assessment and Plan:     * Longstanding persistent atrial fibrillation  Presents today for DINORA/DCCV. Presenting EKG: sinus rhythm-- procedures canceled    Long term current use of  anticoagulant  Taking eliquis for stroke prophylaxis    Taking eliquis for stroke prophylaxis      Amie Leal, EDENILSON  Cardiac Electrophysiology  [facility] - Short Stay Cardiac Unit

## 2023-07-06 NOTE — PLAN OF CARE
Patient arrived to room. EKG shows Pt in NSR, Dr. Bass notified. Plan of care discussed with patient. Will wait for orders.

## 2023-07-06 NOTE — PLAN OF CARE
Cardiology Plan of Care     Patient here for DINORA/DCCV for atrial fibrillation. In sinus rhythm with 1st degree AV block with RBBB and LAFB. Compliant with eliquis. No prior strokes.     DINORA/DCCV canceled. Discussed with Dr. Reagan. Will be started on amiodarone 200mg BID x14 days then 200 daily. F/u with Michelle Newton in 1 month and will discuss RF ablation.     Rashaad Bass MD  Cardiovascular Disease PGY-V  [facility]

## 2023-07-06 NOTE — DISCHARGE SUMMARY
[facility] - Short Stay Cardiac Unit  Cardiac Electrophysiology  Discharge Summary      Patient Name: Juliana Mayorga  MRN: [id number]  Admission Date: 7/6/2023  Hospital Length of Stay: 0 days  Discharge Date and Time: 7/6/2023  8:48 AM  Attending Physician: MARILEE Reagan MD   Discharging Provider: Amie Leal NP  Primary Care Physician: Roel Sauceda Sr, MD    HPI:   Juliana Mayorga 72 y.o. female presents for DINORA/DCCV.  Patient has a history of persistent atrial fibrillation, baseline RBBB, hypertension, hyperlipidemia, bilateral carotid artery stenosis, moderate mitral regurgitation, moderate tricuspid regurgitation, grade III diastolic dysfunction, CKD stage III, osteoporosis, a history of a malignant neoplasm of the tongue with radiation induced esophageal stricture, hypothyroidism, and overweight BMI.      She has been followed for several years with EP at [facility] with Dr. Platt. Per her report, she was originally diagnosed with atrial fibrillation in 2019, but reports that she had been experiencing episodes of palpitations and racing heart rates since approximately 2015. Dr. Platt initiated antiarrhythmic therapy with flecainide 100mg po BID, and when she remained in persistent atrial fibrillation with baseline RBBB QRSd of ~200ms, he increased her dose to flecainide 150mg po BID. She was started on apixaban for oral anticoagulation and denies any adverse bleeding events. She has a history of bilateral carotid artery stenosis, with preserved systolic function, LVEF 65% with GIIIDD. She underwent a DINORA/cardioversion with Dr. Platt in early January, 2023, and reports that she almost immediately went back into atrial fibrillation. She is followed in general cardiology with Dr. Fernandez and is transitionin her care into the [facility].         Procedure(s) (LRB):  Cardioversion or Defibrillation (N/A)  Transesophageal echo (DINORA) intra-procedure log documentation  (N/A)   PROCEDURES CANCELED-- PATIENT IN SINUS RHYTHM    Electrophysiology Procedure    Result Date: 7/6/2023  Aborted invasive cardiology procedure- see Procedure Log link for details.    Intra-Procedure Documentation    Result Date: 7/6/2023  Aborted invasive cardiology procedure- see Procedure Log link for details.    Hospital Course:  Presents for DINORA/DCCV  for atrial fibrillation. Presenting EKG: Sinus rhythm; ventricular rate: 88 bpm. Procedures canceled.   D/c instructions provided to patient, specifically to continue anticoagulant Eliquis and start amiodarone load.  Patient and  verbalized understanding.        Goals of Care Treatment Preferences:     Consults: Cardiology for DINORA    Significant Diagnostic Studies: Cardiac Graphics: ECG: Presenting EKG: Sinus Rhythm; 88bpm    Pending Diagnostic Studies:     Procedure Component Value Units Date/Time    EKG 12-LEAD [[id number]]     Order Status: Sent Lab Status: No result           Final Active Diagnoses:    Diagnosis Date Noted POA    PRINCIPAL PROBLEM:  Longstanding persistent atrial fibrillation [I48.11] 07/05/2023 Yes    Long term current use of anticoagulant [Z79.01] 05/02/2022 Not Applicable      Problems Resolved During this Admission:     Cardiac/Vascular  * Longstanding persistent atrial fibrillation  Presents today for DINORA/DCCV. Presenting EKG: sinus rhythm-- procedures canceled  Continue eliquis  Start amiodarone load  F/u ALAINA Newton NP in 4 weeks      Discharged Condition: stable    Disposition: Home or Self Care    Follow Up:   Follow-up Information     Michelle Newton NP. Schedule an appointment as soon as possible for a visit in 4 week(s).    Specialty: Cardiology  Why: amiodarone load  Contact information:  [facility] [id number] [phone]                       Patient Instructions:      Diet Cardiac     Notify your health care provider if you experience any of the following:  temperature >100.4     Activity as  tolerated     Medications:  Reconciled Home Medications:      Medication List      START taking these medications    * amiodarone 200 MG Tab  Commonly known as: PACERONE  Take 2 tablets (400 mg total) by mouth 2 (two) times daily for 14 days, THEN 1 tablet (200 mg total) once daily for 16 days.  Start taking on: July 6, 2023     * amiodarone 200 MG Tab  Commonly known as: PACERONE  Take 1 tablet (200 mg total) by mouth once daily.  Start taking on: August 4, 2023         * This list has 2 medication(s) that are the same as other medications prescribed for you. Read the directions carefully, and ask your doctor or other care provider to review them with you.            CONTINUE taking these medications    amLODIPine 10 MG tablet  Commonly known as: NORVASC  Take 1 tablet (10 mg total) by mouth nightly.     ELIQUIS 5 mg Tab  Generic drug: apixaban  Take 1 tablet (5 mg total) by mouth 2 (two) times daily.     esomeprazole 40 MG capsule  Commonly known as: NEXIUM  Take 40 mg by mouth once daily.     eszopiclone 3 mg Tab  Commonly known as: LUNESTA  Take 1 tablet (3 mg total) by mouth every evening.     irbesartan 150 MG tablet  Commonly known as: AVAPRO  Take 1 tablet (150 mg total) by mouth every evening.     levothyroxine 50 MCG tablet  Commonly known as: SYNTHROID  Take 50 mcg by mouth.     rosuvastatin 20 MG tablet  Commonly known as: CRESTOR  Take 1 tablet (20 mg total) by mouth every evening.            Time spent on the discharge of patient: 20 minutes    Amie Leal NP  Cardiac Electrophysiology  [facility] - Short Stay Cardiac Unit

## 2023-07-06 NOTE — ASSESSMENT & PLAN NOTE
Presents today for DINORA/DCCV. Presenting EKG: sinus rhythm-- procedures canceled  Continue eliquis  Start amiodarone load  F/u ALAINA Newton NP in 4 weeks

## 2023-07-19 NOTE — PROGRESS NOTES
Ms. Mayorga is a patient of Dr. Reagan and was last seen in clinic 6/15/2023.      Subjective:   Patient ID:  Juliana Mayorga is a 72 y.o. female who presents for follow up of Atrial Fibrillation  .     HPI:    Ms. Mayorga is a 72 y.o. female with persistent AF, RBBB, HLD, HTN, carotid artery disease, mod MR, mod TR, grade III diastolic dysfunction, CKD, tongue CA hx with radiation-induced esophageal stricture, hypothyroid here for follow up.    Background:    History of Present Illness:    Ms. Juliana Mayorga has a past medical history significant for persistent atrial fibrillation, baseline RBBB, hypertension, hyperlipidemia, bilateral carotid artery stenosis, moderate mitral regurgitation, moderate tricuspid regurgitation, grade III diastolic dysfunction, CKD stage III, osteoporosis, a history of a malignant neoplasm of the tongue with radiation induced esophageal stricture, hypothyroidism, and overweight BMI.     She has been followed for several years with EP at [facility] with Dr. Platt. Per her report, she was originally diagnosed with atrial fibrillation in 2019, but reports that she had been experiencing episodes of palpitations and racing heart rates since approximately 2015. Dr. Platt initiated antiarrhythmic therapy with flecainide 100mg po BID, and when she remained in persistent atrial fibrillation with baseline RBBB QRSd of ~200ms, he increased her dose to flecainide 150mg po BID. She was started on apixaban for oral anticoagulation and denies any adverse bleeding events. She has a history of bilateral carotid artery stenosis, with preserved systolic function, LVEF 65% with GIIIDD. She underwent a DINORA/cardioversion with Dr. Platt in early January, 2023, and reports that she almost immediately went back into atrial fibrillation. She is followed in general cardiology with Dr. Fernandez and would like to transition her care into the [facility].       She  presents to clinic today with her . In discussion with Ms. Mayorga today, she tells me that she is feeling overall quite well. She has a [employer] mobile dread that has informed her that she has remained in atrial fibrillation since at least 5/7/2023. She denies any potential triggers of this episode, and notes that she may have been in atrial fibrillation for longer than this time period. She reports symptoms of mild exercise intolerance, palpitations, slightly worsened shortness of breath, and generalized fatigue while in atrial fibrillation. She denies any episodes of dizziness, lightheadedness, syncope/presyncope, chest pain or chest discomfort, nausea or vomiting, orthopnea, or PND. She and her  travel often and are anticipating going to [place] later this summer. She reports baseline left hearing loss. She reports baseline shortness of breath and dyspnea with exertion that are slightly worse while in persistent atrial fibrillation. She can climb one flight of stairs prior to needing to take a break, and continues to attempt to increase her level of physical activity.     - We discussed the pathophysiology of atrial fibrillation; specifically, we discussed persistent atrial fibrillation and the concept that the longer a patient remains in atrial fibrillation, the more challenging rhythm restoration and maintenance of sinus rhythm may become. We discussed that atrial fibrillation has an increased risk of CVA.  - She remains in persistent atrial fibrillation on examination today, and per her [employer] mobile dread has remained in atrial fibrillation since 5/7/2023. She has severe left atrial enlargement on echocardiogram. We discussed performing another DINORA/cardioversion for an attempt at rhythm restoration. This procedure was described in detail, in addition to potential risks, benefits, and alternative options. Ms. Mayorga voices understanding and is in agreement, and informed consent was  obtained.  - We will discontinue her flecainide now. She is not a candidate for class Ic antiarrhythmic agents given her history of carotid artery stenosis, GIIIDD, and baseline RBBB with QRSd of ~200ms. She is not a candidate for class III antiarrhythmics in the setting of CKD stage III. We will plan to stop her flecainide now, allow a 2 week washout period, and have her return to undergo a DINORA/cardioversion. We will then plan to start amiodarone therapy for antiarrhythmic.   - She is presently not maintained on rate control; this will be reevaluated following her cardioversion.   - Her [id number] is 4 (HTN, carotid artery disease, female gender, age 65-74), portending an annual adjusted risk of CVA of 4%. She remains on uninterrupted apixaban therapy with no bleeding events reported.   - We discussed the possibility of catheter ablation with pulmonary vein isolation should she continue to have symptoms and AF despite AAD therapy. She voices understanding, although she would like to attempt rhythm control with medication at this time. We discussed that in persistent atrial fibrillation, the success rates with radiofrequency catheter ablation are reduced.     - Possible underlying drivers of atrial fibrillation were addressed at this appointment, including recommendations for maintenance of a healthy BMI - now a class I recommendation. Review of laboratory data revealed elevated TSH at 6.51, with no FT4 on record. We will assess this with her next laboratory draw. A request for sleep study was placed today to evaluate for possible underlying obstructive sleep apnea.      This patient will present to the EP laboratory to undergo a DINORA/cardioversion with initiation of amiodarone for antiarrhythmic therapy and continued apixaban oral anticoagulation. All questions and concerns were addressed at this encounter.     Update (07/21/2023):    7/6/2023: Ms. Mayorga presented today to undergo a DINORA and cardioversion;  however, she presented in sinus rhythm and does not require cardioversion. We will start amiodarone for antiarrhythmic therapy, with continued oral anticoagulation with apixaban    Today she says she has been feeling well since starting amiodarone. Just completed amio load yesterday. No AF symptoms (Usual AF symptoms LH and GREY) and she has been checking her rhythm via Kardia device.  No GREY, CP, palps, LH, syncope reported. Says this is the longest time she has been in rhythm in quite some time.    She is currently taking eliquis 5mg BID for stroke prophylaxis and denies significant bleeding episodes. She is currently being treated with amiodarone 200mg daily for rhythm control (just finished loading dose yesterday).  Kidney function is stable, with a creatinine of 1.17 on 6/29/2023. LFts and TSH WNL 6/2022.    I have personally reviewed the patient's EKG today, which shows sinus rhythm with 1st deg AVB and RBBB at 71bpm. DC interval is 278. QRS is 136. QTc is 482.    Relevant Cardiac Test Results:    2D Echo (5/23/2022):  The left ventricle is normal in size with concentric remodeling and normal systolic function.  The estimated ejection fraction is 65%.  Moderate to severe left atrial enlargement.  Grade III left ventricular diastolic dysfunction.  There is pulmonary hypertension.  The estimated PA systolic pressure is 55 mmHg.  Normal right ventricular size with normal right ventricular systolic function.  Normal central venous pressure (3 mmHg).  Mild right atrial enlargement.  Mild-to-moderate mitral regurgitation.  Moderate tricuspid regurgitation.    Current Outpatient Medications   Medication Sig    amiodarone (PACERONE) 200 MG Tab Take 2 tablets (400 mg total) by mouth 2 (two) times daily for 14 days, THEN 1 tablet (200 mg total) once daily for 16 days.    [START ON 8/4/2023] amiodarone (PACERONE) 200 MG Tab Take 1 tablet (200 mg total) by mouth once daily.    amLODIPine (NORVASC) 10 MG tablet Take 1  "tablet (10 mg total) by mouth nightly.    ELIQUIS 5 mg Tab Take 1 tablet (5 mg total) by mouth 2 (two) times daily.    esomeprazole (NEXIUM) 40 MG capsule Take 40 mg by mouth once daily.     eszopiclone 3 mg Tab Take 1 tablet (3 mg total) by mouth every evening.    irbesartan (AVAPRO) 150 MG tablet Take 1 tablet (150 mg total) by mouth every evening.    levothyroxine (SYNTHROID) 50 MCG tablet Take 50 mcg by mouth.    rosuvastatin (CRESTOR) 20 MG tablet Take 1 tablet (20 mg total) by mouth every evening.     No current facility-administered medications for this visit.       Review of Systems   Constitutional: Negative for malaise/fatigue.   Cardiovascular:  Negative for chest pain, dyspnea on exertion, irregular heartbeat, leg swelling and palpitations.   Respiratory:  Negative for shortness of breath.    Hematologic/Lymphatic: Negative for bleeding problem.   Skin:  Negative for rash.   Musculoskeletal:  Negative for myalgias.   Gastrointestinal:  Negative for hematemesis, hematochezia and nausea.   Genitourinary:  Negative for hematuria.   Neurological:  Negative for light-headedness.   Psychiatric/Behavioral:  Negative for altered mental status.    Allergic/Immunologic: Negative for persistent infections.     Objective:          /73   Pulse 71   Ht 5' 9" (1.753 m)   Wt 81.3 kg (179 lb 3.7 oz)   BMI 26.47 kg/m²     Physical Exam  Vitals and nursing note reviewed.   Constitutional:       Appearance: Normal appearance. She is well-developed.   HENT:      Head: Normocephalic.      Nose: Nose normal.   Eyes:      Pupils: Pupils are equal, round, and reactive to light.   Cardiovascular:      Rate and Rhythm: Normal rate and regular rhythm.   Pulmonary:      Effort: No respiratory distress.      Breath sounds: Normal breath sounds.   Musculoskeletal:         General: Normal range of motion.   Skin:     General: Skin is warm and dry.      Findings: No erythema.   Neurological:      Mental Status: She is alert " and oriented to person, place, and time.   Psychiatric:         Speech: Speech normal.         Behavior: Behavior normal.         Lab Results   Component Value Date     06/29/2023    K 5.2 (H) 06/29/2023    MG 1.5 (L) 06/21/2022    BUN 29 (H) 06/29/2023    BUN 22.0 (H) 07/13/2022    CREATININE 1.17 06/29/2023    ALT 15 06/21/2022    AST 23 06/21/2022    HGB 14.9 06/29/2023    HCT 45.2 06/29/2023    TSH 6.51 (H) 06/21/2022    LDLCALC 108 06/21/2022       Recent Labs   Lab 06/29/23  1102   INR 1.1       Assessment:     1. Paroxysmal atrial fibrillation    2. Primary hypertension    3. RBBB    4. Anticoagulation management encounter    5. Long term current use of anticoagulant    6. TSH (thyroid-stimulating hormone deficiency)    7. Long term current use of amiodarone        Plan:     In summary, Ms. Mayorga is a 72 y.o. female with persistent AF, RBBB, HLD, HTN, carotid artery disease, mod MR, mod TR, grade III diastolic dysfunction, CKD, tongue CA hx with radiation-induced esophageal stricture, hypothyroid here for follow up.  She is 2 weeks s/p aborted DCCV as pt presented in SR. Amiodarone load started. She is doing well from a rhythm standpoint, with no documented or symptomatic recurrence of arrhythmia. She just completed amiodarone load. No AF on Kardia device. CHADSVASc 3 on Metaversum. She feels well and will be traveling until late October. At that time will update thyroid and LFTs.    Continue current regimen  RTC 3 mo with TSH and LFTs, sooner if needed    *A copy of this note has been sent to Dr. Reagan*    Follow up in about 3 months (around 10/21/2023).    ------------------------------------------------------------------    MONROE Bush, NP-C  Cardiac Electrophysiology

## 2023-07-21 ENCOUNTER — OFFICE VISIT (OUTPATIENT)
Dept: ELECTROPHYSIOLOGY | Facility: CLINIC | Age: 73
End: 2023-07-21
Payer: MEDICARE

## 2023-07-21 ENCOUNTER — HOSPITAL ENCOUNTER (OUTPATIENT)
Dept: CARDIOLOGY | Facility: CLINIC | Age: 73
Discharge: HOME | End: 2023-07-21
Payer: MEDICARE

## 2023-07-21 VITALS
HEIGHT: 69 IN | SYSTOLIC BLOOD PRESSURE: 131 MMHG | BODY MASS INDEX: 26.55 KG/M2 | WEIGHT: 179.25 LBS | DIASTOLIC BLOOD PRESSURE: 73 MMHG | HEART RATE: 71 BPM

## 2023-07-21 DIAGNOSIS — Z79.01 ANTICOAGULATION MANAGEMENT ENCOUNTER: ICD-10-CM

## 2023-07-21 DIAGNOSIS — E03.8 TSH (THYROID-STIMULATING HORMONE DEFICIENCY): ICD-10-CM

## 2023-07-21 DIAGNOSIS — I48.0 PAROXYSMAL ATRIAL FIBRILLATION: Primary | ICD-10-CM

## 2023-07-21 DIAGNOSIS — Z79.01 LONG TERM CURRENT USE OF ANTICOAGULANT: ICD-10-CM

## 2023-07-21 DIAGNOSIS — Z79.899 LONG TERM CURRENT USE OF AMIODARONE: ICD-10-CM

## 2023-07-21 DIAGNOSIS — Z51.81 ANTICOAGULATION MANAGEMENT ENCOUNTER: ICD-10-CM

## 2023-07-21 DIAGNOSIS — I45.10 RBBB: ICD-10-CM

## 2023-07-21 DIAGNOSIS — I10 PRIMARY HYPERTENSION: ICD-10-CM

## 2023-07-21 DIAGNOSIS — I48.19 PERSISTENT ATRIAL FIBRILLATION: ICD-10-CM

## 2023-07-21 PROCEDURE — 99214 PR OFFICE/OUTPT VISIT, EST, LEVL IV, 30-39 MIN: ICD-10-PCS | Mod: S$GLB,,, | Performed by: NURSE PRACTITIONER

## 2023-07-21 PROCEDURE — 99999 PR PBB SHADOW E&M-EST. PATIENT-LVL III: CPT | Mod: PBBFAC,,, | Performed by: NURSE PRACTITIONER

## 2023-07-21 PROCEDURE — 93010 RHYTHM STRIP: ICD-10-PCS | Mod: S$GLB,,, | Performed by: INTERNAL MEDICINE

## 2023-07-21 PROCEDURE — 1159F MED LIST DOCD IN RCRD: CPT | Mod: CPTII,S$GLB,, | Performed by: NURSE PRACTITIONER

## 2023-07-21 PROCEDURE — 3078F PR MOST RECENT DIASTOLIC BLOOD PRESSURE < 80 MM HG: ICD-10-PCS | Mod: CPTII,S$GLB,, | Performed by: NURSE PRACTITIONER

## 2023-07-21 PROCEDURE — 3288F FALL RISK ASSESSMENT DOCD: CPT | Mod: CPTII,S$GLB,, | Performed by: NURSE PRACTITIONER

## 2023-07-21 PROCEDURE — 1126F AMNT PAIN NOTED NONE PRSNT: CPT | Mod: CPTII,S$GLB,, | Performed by: NURSE PRACTITIONER

## 2023-07-21 PROCEDURE — 3075F PR MOST RECENT SYSTOLIC BLOOD PRESS GE 130-139MM HG: ICD-10-PCS | Mod: CPTII,S$GLB,, | Performed by: NURSE PRACTITIONER

## 2023-07-21 PROCEDURE — 3075F SYST BP GE 130 - 139MM HG: CPT | Mod: CPTII,S$GLB,, | Performed by: NURSE PRACTITIONER

## 2023-07-21 PROCEDURE — 3288F PR FALLS RISK ASSESSMENT DOCUMENTED: ICD-10-PCS | Mod: CPTII,S$GLB,, | Performed by: NURSE PRACTITIONER

## 2023-07-21 PROCEDURE — 1101F PR PT FALLS ASSESS DOC 0-1 FALLS W/OUT INJ PAST YR: ICD-10-PCS | Mod: CPTII,S$GLB,, | Performed by: NURSE PRACTITIONER

## 2023-07-21 PROCEDURE — 1159F PR MEDICATION LIST DOCUMENTED IN MEDICAL RECORD: ICD-10-PCS | Mod: CPTII,S$GLB,, | Performed by: NURSE PRACTITIONER

## 2023-07-21 PROCEDURE — 4010F PR ACE/ARB THEARPY RXD/TAKEN: ICD-10-PCS | Mod: CPTII,S$GLB,, | Performed by: NURSE PRACTITIONER

## 2023-07-21 PROCEDURE — 3008F PR BODY MASS INDEX (BMI) DOCUMENTED: ICD-10-PCS | Mod: CPTII,S$GLB,, | Performed by: NURSE PRACTITIONER

## 2023-07-21 PROCEDURE — 1126F PR PAIN SEVERITY QUANTIFIED, NO PAIN PRESENT: ICD-10-PCS | Mod: CPTII,S$GLB,, | Performed by: NURSE PRACTITIONER

## 2023-07-21 PROCEDURE — 93005 ELECTROCARDIOGRAM TRACING: CPT | Mod: S$GLB,,, | Performed by: STUDENT IN AN ORGANIZED HEALTH CARE EDUCATION/TRAINING PROGRAM

## 2023-07-21 PROCEDURE — 99214 OFFICE O/P EST MOD 30 MIN: CPT | Mod: S$GLB,,, | Performed by: NURSE PRACTITIONER

## 2023-07-21 PROCEDURE — 1160F RVW MEDS BY RX/DR IN RCRD: CPT | Mod: CPTII,S$GLB,, | Performed by: NURSE PRACTITIONER

## 2023-07-21 PROCEDURE — 99999 PR PBB SHADOW E&M-EST. PATIENT-LVL III: ICD-10-PCS | Mod: PBBFAC,,, | Performed by: NURSE PRACTITIONER

## 2023-07-21 PROCEDURE — 3008F BODY MASS INDEX DOCD: CPT | Mod: CPTII,S$GLB,, | Performed by: NURSE PRACTITIONER

## 2023-07-21 PROCEDURE — 93005 RHYTHM STRIP: ICD-10-PCS | Mod: S$GLB,,, | Performed by: STUDENT IN AN ORGANIZED HEALTH CARE EDUCATION/TRAINING PROGRAM

## 2023-07-21 PROCEDURE — 3078F DIAST BP <80 MM HG: CPT | Mod: CPTII,S$GLB,, | Performed by: NURSE PRACTITIONER

## 2023-07-21 PROCEDURE — 4010F ACE/ARB THERAPY RXD/TAKEN: CPT | Mod: CPTII,S$GLB,, | Performed by: NURSE PRACTITIONER

## 2023-07-21 PROCEDURE — 1160F PR REVIEW ALL MEDS BY PRESCRIBER/CLIN PHARMACIST DOCUMENTED: ICD-10-PCS | Mod: CPTII,S$GLB,, | Performed by: NURSE PRACTITIONER

## 2023-07-21 PROCEDURE — 1101F PT FALLS ASSESS-DOCD LE1/YR: CPT | Mod: CPTII,S$GLB,, | Performed by: NURSE PRACTITIONER

## 2023-07-21 PROCEDURE — 93010 ELECTROCARDIOGRAM REPORT: CPT | Mod: S$GLB,,, | Performed by: INTERNAL MEDICINE

## 2023-10-30 ENCOUNTER — HOSPITAL ENCOUNTER (OUTPATIENT)
Dept: CARDIOLOGY | Facility: CLINIC | Age: 73
Discharge: HOME | End: 2023-10-30
Payer: MEDICARE

## 2023-10-30 ENCOUNTER — OFFICE VISIT (OUTPATIENT)
Dept: ELECTROPHYSIOLOGY | Facility: CLINIC | Age: 73
End: 2023-10-30
Payer: MEDICARE

## 2023-10-30 VITALS — HEIGHT: 69 IN | WEIGHT: 181 LBS | HEART RATE: 72 BPM | BODY MASS INDEX: 26.81 KG/M2

## 2023-10-30 DIAGNOSIS — I45.10 RBBB: ICD-10-CM

## 2023-10-30 DIAGNOSIS — I34.0 NONRHEUMATIC MITRAL VALVE REGURGITATION: ICD-10-CM

## 2023-10-30 DIAGNOSIS — I65.23 CAROTID STENOSIS, ASYMPTOMATIC, BILATERAL: ICD-10-CM

## 2023-10-30 DIAGNOSIS — Z79.01 ANTICOAGULATION MANAGEMENT ENCOUNTER: ICD-10-CM

## 2023-10-30 DIAGNOSIS — K22.2 RADIATION-INDUCED ESOPHAGEAL STRICTURE: ICD-10-CM

## 2023-10-30 DIAGNOSIS — I51.89 GRADE III DIASTOLIC DYSFUNCTION: ICD-10-CM

## 2023-10-30 DIAGNOSIS — I48.0 PAROXYSMAL ATRIAL FIBRILLATION: ICD-10-CM

## 2023-10-30 DIAGNOSIS — E03.8 SUBCLINICAL HYPOTHYROIDISM: ICD-10-CM

## 2023-10-30 DIAGNOSIS — I48.19 PERSISTENT ATRIAL FIBRILLATION: Primary | ICD-10-CM

## 2023-10-30 DIAGNOSIS — C02.9 MALIGNANT NEOPLASM OF TONGUE: ICD-10-CM

## 2023-10-30 DIAGNOSIS — I10 PRIMARY HYPERTENSION: ICD-10-CM

## 2023-10-30 DIAGNOSIS — E78.00 PURE HYPERCHOLESTEROLEMIA: ICD-10-CM

## 2023-10-30 DIAGNOSIS — Z51.81 ANTICOAGULATION MANAGEMENT ENCOUNTER: ICD-10-CM

## 2023-10-30 DIAGNOSIS — E66.3 OVERWEIGHT (BMI 25.0-29.9): ICD-10-CM

## 2023-10-30 DIAGNOSIS — I51.7 LEFT VENTRICULAR HYPERTROPHY: ICD-10-CM

## 2023-10-30 DIAGNOSIS — Z79.01 LONG TERM CURRENT USE OF ANTICOAGULANT: ICD-10-CM

## 2023-10-30 DIAGNOSIS — M81.0 AGE-RELATED OSTEOPOROSIS WITHOUT CURRENT PATHOLOGICAL FRACTURE: ICD-10-CM

## 2023-10-30 DIAGNOSIS — Z79.899 LONG TERM CURRENT USE OF AMIODARONE: ICD-10-CM

## 2023-10-30 DIAGNOSIS — I36.1 NONRHEUMATIC TRICUSPID VALVE REGURGITATION: ICD-10-CM

## 2023-10-30 DIAGNOSIS — N18.30 STAGE 3 CHRONIC KIDNEY DISEASE, UNSPECIFIED WHETHER STAGE 3A OR 3B CKD: ICD-10-CM

## 2023-10-30 DIAGNOSIS — E03.8 TSH (THYROID-STIMULATING HORMONE DEFICIENCY): ICD-10-CM

## 2023-10-30 DIAGNOSIS — I48.19 PERSISTENT ATRIAL FIBRILLATION: ICD-10-CM

## 2023-10-30 PROCEDURE — 99214 OFFICE O/P EST MOD 30 MIN: CPT | Mod: S$GLB,,, | Performed by: STUDENT IN AN ORGANIZED HEALTH CARE EDUCATION/TRAINING PROGRAM

## 2023-10-30 PROCEDURE — 93005 RHYTHM STRIP: ICD-10-PCS | Mod: S$GLB,,, | Performed by: STUDENT IN AN ORGANIZED HEALTH CARE EDUCATION/TRAINING PROGRAM

## 2023-10-30 PROCEDURE — 99214 PR OFFICE/OUTPT VISIT, EST, LEVL IV, 30-39 MIN: ICD-10-PCS | Mod: S$GLB,,, | Performed by: STUDENT IN AN ORGANIZED HEALTH CARE EDUCATION/TRAINING PROGRAM

## 2023-10-30 PROCEDURE — 1101F PT FALLS ASSESS-DOCD LE1/YR: CPT | Mod: CPTII,S$GLB,, | Performed by: STUDENT IN AN ORGANIZED HEALTH CARE EDUCATION/TRAINING PROGRAM

## 2023-10-30 PROCEDURE — 4010F ACE/ARB THERAPY RXD/TAKEN: CPT | Mod: CPTII,S$GLB,, | Performed by: STUDENT IN AN ORGANIZED HEALTH CARE EDUCATION/TRAINING PROGRAM

## 2023-10-30 PROCEDURE — 3008F BODY MASS INDEX DOCD: CPT | Mod: CPTII,S$GLB,, | Performed by: STUDENT IN AN ORGANIZED HEALTH CARE EDUCATION/TRAINING PROGRAM

## 2023-10-30 PROCEDURE — 99999 PR PBB SHADOW E&M-EST. PATIENT-LVL II: ICD-10-PCS | Mod: PBBFAC,,, | Performed by: STUDENT IN AN ORGANIZED HEALTH CARE EDUCATION/TRAINING PROGRAM

## 2023-10-30 PROCEDURE — 93010 RHYTHM STRIP: ICD-10-PCS | Mod: S$GLB,,, | Performed by: INTERNAL MEDICINE

## 2023-10-30 PROCEDURE — 4010F PR ACE/ARB THEARPY RXD/TAKEN: ICD-10-PCS | Mod: CPTII,S$GLB,, | Performed by: STUDENT IN AN ORGANIZED HEALTH CARE EDUCATION/TRAINING PROGRAM

## 2023-10-30 PROCEDURE — 1126F AMNT PAIN NOTED NONE PRSNT: CPT | Mod: CPTII,S$GLB,, | Performed by: STUDENT IN AN ORGANIZED HEALTH CARE EDUCATION/TRAINING PROGRAM

## 2023-10-30 PROCEDURE — 93010 ELECTROCARDIOGRAM REPORT: CPT | Mod: S$GLB,,, | Performed by: INTERNAL MEDICINE

## 2023-10-30 PROCEDURE — 1126F PR PAIN SEVERITY QUANTIFIED, NO PAIN PRESENT: ICD-10-PCS | Mod: CPTII,S$GLB,, | Performed by: STUDENT IN AN ORGANIZED HEALTH CARE EDUCATION/TRAINING PROGRAM

## 2023-10-30 PROCEDURE — 99999 PR PBB SHADOW E&M-EST. PATIENT-LVL II: CPT | Mod: PBBFAC,,, | Performed by: STUDENT IN AN ORGANIZED HEALTH CARE EDUCATION/TRAINING PROGRAM

## 2023-10-30 PROCEDURE — 1101F PR PT FALLS ASSESS DOC 0-1 FALLS W/OUT INJ PAST YR: ICD-10-PCS | Mod: CPTII,S$GLB,, | Performed by: STUDENT IN AN ORGANIZED HEALTH CARE EDUCATION/TRAINING PROGRAM

## 2023-10-30 PROCEDURE — 3008F PR BODY MASS INDEX (BMI) DOCUMENTED: ICD-10-PCS | Mod: CPTII,S$GLB,, | Performed by: STUDENT IN AN ORGANIZED HEALTH CARE EDUCATION/TRAINING PROGRAM

## 2023-10-30 PROCEDURE — 93005 ELECTROCARDIOGRAM TRACING: CPT | Mod: S$GLB,,, | Performed by: STUDENT IN AN ORGANIZED HEALTH CARE EDUCATION/TRAINING PROGRAM

## 2023-10-30 PROCEDURE — 3288F FALL RISK ASSESSMENT DOCD: CPT | Mod: CPTII,S$GLB,, | Performed by: STUDENT IN AN ORGANIZED HEALTH CARE EDUCATION/TRAINING PROGRAM

## 2023-10-30 PROCEDURE — 3288F PR FALLS RISK ASSESSMENT DOCUMENTED: ICD-10-PCS | Mod: CPTII,S$GLB,, | Performed by: STUDENT IN AN ORGANIZED HEALTH CARE EDUCATION/TRAINING PROGRAM

## 2023-10-30 NOTE — PROGRESS NOTES
Electrophysiology Clinic Note    Reason for follow-up patient visit: Ongoing evaluation and recommendations regarding persistent atrial fibrillation, s/p aborted DINORA and cardioversion on 7/6/2023 when she presented in sinus rhythm.     PRESENTING HISTORY:     History of Present Illness:  Ms. Juliana Mayorga is a cecilio 72-year-old woman who returns to clinic today for ongoing evaluation and recommendations regarding persistent atrial fibrillation. She has a past medical history significant for persistent atrial fibrillation, baseline RBBB, hypertension, hyperlipidemia, bilateral carotid artery stenosis, moderate mitral regurgitation, moderate tricuspid regurgitation, grade III diastolic dysfunction, CKD stage III, osteoporosis, a history of a malignant neoplasm of the tongue with radiation induced esophageal stricture, hypothyroidism, and overweight BMI. She presented to undergo a DINORA and cardioversion on 7/6/2023; however, this was aborted when she presented in sinus rhythm. She remains in sinus rhythm on ECG assessment today maintained on amiodarone and apixaban therapy.      She was previously followed for several years with EP at [facility] with Dr. Platt. Per her report, she was originally diagnosed with atrial fibrillation in 2019, but reports that she had been experiencing episodes of palpitations and racing heart rates since approximately 2015. Dr. Platt initiated antiarrhythmic therapy with flecainide 100mg po BID, and when she remained in persistent atrial fibrillation with baseline RBBB QRSd of ~200ms, he increased her dose to flecainide 150mg po BID. She was started on apixaban for oral anticoagulation and denies any adverse bleeding events. She has a history of bilateral carotid artery stenosis, with preserved systolic function, LVEF 65% with GIIIDD. She underwent a DINORA/cardioversion with Dr. Platt in early January, 2023, and reports that she almost immediately went back into  atrial fibrillation. She is followed in general cardiology with Dr. Fernandez has transitioned her care into the [facility]. At our prior encounters, we discussed the risks of flecainide therapy given her baseline RBBB with a QRSd of 136ms. This agent was discontinued and allowed to washout. She presented to undergo a DINORA and cardioversion on 7/6/2023; however, this was aborted when she presented in sinus rhythm. She was initiated on amiodarone therapy and remains in sinus rhythm on ECG assessment today. She remains on uninterrupted apixaban therapy with no adverse bleeding events reported.      She presents to clinic today with her . In discussion with Ms. Mayorga today, she tells me that she is feeling overall quite well. She has a [employer] mobile dread that has not captured any subsequent events of atrial fibrillation since she has started amiodarone therapy. She reports symptoms of mild exercise intolerance, palpitations, slightly worsened shortness of breath, and generalized fatigue while in atrial fibrillation and denies recurrent symptoms. She denies any episodes of dizziness, lightheadedness, syncope/presyncope, chest pain or chest discomfort, palpitations, nausea or vomiting, orthopnea, or PND. She and her  travel often and are anticipating going to [place]. She reports baseline left hearing loss. She reports baseline shortness of breath and dyspnea with exertion that are slightly worse while in atrial fibrillation; however, she feels that she has returned to her baseline. She can climb one flight of stairs prior to needing to take a break, and continues to attempt to increase her level of physical activity.     Review of Systems:  Review of Systems   Constitutional:  Negative for activity change.   HENT:  Positive for hearing loss. Negative for nasal congestion, nosebleeds, postnasal drip, rhinorrhea, sinus pressure/congestion, sneezing and sore throat.         Chronic left-sided  hearing loss.   Respiratory:  Positive for shortness of breath. Negative for apnea, cough, chest tightness and wheezing.    Cardiovascular:  Negative for chest pain, palpitations and leg swelling.   Gastrointestinal:  Negative for abdominal distention, abdominal pain, anal bleeding, blood in stool, change in bowel habit, constipation, diarrhea, nausea and vomiting.   Genitourinary:  Negative for dysuria and hematuria.   Musculoskeletal:  Positive for arthralgias and back pain. Negative for gait problem.   Neurological:  Negative for dizziness, seizures, syncope, weakness, light-headedness, headaches and coordination difficulties.        PAST HISTORY:     Past Medical History:  - Persistent atrial fibrillation  - Baseline RBBB  - Hypertension  - Hyperlipidemia  - Bilateral carotid artery stenosis  - Moderate mitral regurgitation  - Moderate tricuspid regurgitation  - Grade III diastolic dysfunction  - CKD stage III  - Osteoporosis  - History of a malignant neoplasm of the tongue with radiation induced esophageal stricture  - Hypothyroidism  - Overweight BMI      Past Surgical History:   Procedure Laterality Date     egd   once per month at [facility]      BREAST LUMPECTOMY Right 2013    CARDIOVERSION         Family History:  Family History   Problem Relation Age of Onset    Alzheimer's disease Mother     Cancer Father        Social History:  She  reports that she quit smoking about 18 years ago. Her smoking use included cigarettes. She has never used smokeless tobacco. She reports current alcohol use of about 3.0 standard drinks of alcohol per week. No history on file for drug use.      MEDICATIONS & ALLERGIES:     Review of patient's allergies indicates:   Allergen Reactions    Cefazolin      Other reaction(s): Extreme pain left leg and right arm  Other reaction(s): Extreme pain left leg and right arm       Current Outpatient Medications on File Prior to Visit   Medication Sig Dispense Refill    amiodarone (PACERONE)  "200 MG Tab Take 1 tablet (200 mg total) by mouth once daily. 90 tablet 2    amLODIPine (NORVASC) 10 MG tablet Take 1 tablet (10 mg total) by mouth nightly. 90 tablet 3    ELIQUIS 5 mg Tab Take 1 tablet (5 mg total) by mouth 2 (two) times daily. 60 tablet 11    esomeprazole (NEXIUM) 40 MG capsule Take 40 mg by mouth once daily.       eszopiclone 3 mg Tab Take 1 tablet (3 mg total) by mouth every evening. 90 tablet 1    irbesartan (AVAPRO) 150 MG tablet Take 1 tablet (150 mg total) by mouth every evening. 90 tablet 3    levothyroxine (SYNTHROID) 50 MCG tablet Take 50 mcg by mouth.      rosuvastatin (CRESTOR) 20 MG tablet Take 1 tablet (20 mg total) by mouth every evening. 90 tablet 3     No current facility-administered medications on file prior to visit.        OBJECTIVE:     Vital Signs:  Pulse 72   Ht 5' 9" (1.753 m)   Wt 82.1 kg (181 lb)   BMI 26.73 kg/m²     Physical Exam:  Physical Exam  Constitutional:       General: She is not in acute distress.     Appearance: Normal appearance. She is not ill-appearing or diaphoretic.      Comments: Well-appearing woman in NAD.   HENT:      Head: Normocephalic and atraumatic.      Nose: Nose normal.      Mouth/Throat:      Mouth: Mucous membranes are moist.      Pharynx: Oropharynx is clear.   Eyes:      Pupils: Pupils are equal, round, and reactive to light.   Cardiovascular:      Rate and Rhythm: Normal rate and regular rhythm.      Pulses: Normal pulses.      Heart sounds: Murmur heard.      No friction rub. No gallop.      Comments: II/VI HSM best appreciate at the [facility].  Pulmonary:      Effort: Pulmonary effort is normal. No respiratory distress.      Breath sounds: Normal breath sounds. No wheezing, rhonchi or rales.   Chest:      Chest wall: No tenderness.   Abdominal:      General: There is no distension.      Palpations: Abdomen is soft.      Tenderness: There is no abdominal tenderness.   Musculoskeletal:         General: No swelling or tenderness.      Cervical " back: Normal range of motion.      Right lower leg: No edema.      Left lower leg: No edema.   Skin:     General: Skin is warm and dry.      Findings: No erythema, lesion or rash.   Neurological:      General: No focal deficit present.      Mental Status: She is alert and oriented to person, place, and time. Mental status is at baseline.      Motor: No weakness.      Gait: Gait normal.   Psychiatric:         Mood and Affect: Mood normal.         Behavior: Behavior normal.        Laboratory Data:  Lab Results   Component Value Date    WBC 6.17 06/29/2023    HGB 14.9 06/29/2023    HCT 45.2 06/29/2023    MCV 94 06/29/2023     06/29/2023     Lab Results   Component Value Date    GLU 92 06/29/2023     06/29/2023    K 5.2 (H) 06/29/2023     06/29/2023    CO2 23 06/29/2023    BUN 29 (H) 06/29/2023    CREATININE 1.17 06/29/2023    CALCIUM 10.3 06/29/2023    MG 1.5 (L) 06/21/2022     Lab Results   Component Value Date    INR 1.1 06/29/2023    INR 1.1 01/17/2023    INR 1.1 11/22/2022       Pertinent Cardiac Data:  ECG: Normal sinus rhythm with first degree AV block, rate of 72 bpm,  ms, RBBB with  ms, QT/QTc 470/514 ms.     Bilateral Carotid Artery Duplex Ultrasound - 3/19/2021:  There is 50-59% right Internal Carotid Stenosis.  >50% right common carotid stenosis.  >50% right ECA stenosis.  There is 20-39% left Internal Carotid Stenosis.  Possible nonobstructive ulcer noted in left common carotid.    Resting 2D Transthoracic Echocardiogram - 5/23/2022:  The left ventricle is normal in size with concentric remodeling and normal systolic function.  The estimated ejection fraction is 65%.  Moderate to severe left atrial enlargement.  Grade III left ventricular diastolic dysfunction.  There is pulmonary hypertension.  The estimated PA systolic pressure is 55 mmHg.  Normal right ventricular size with normal right ventricular systolic function.  Normal central venous pressure (3 mmHg).  Mild right  atrial enlargement.  Mild-to-moderate mitral regurgitation.  Moderate tricuspid regurgitation.       ASSESSMENT & PLAN:   Ms. Juliana Mayorga is a cecilio 72-year-old woman who returns to clinic today for ongoing evaluation and recommendations regarding persistent atrial fibrillation. She has a past medical history significant for persistent atrial fibrillation, baseline RBBB, hypertension, hyperlipidemia, bilateral carotid artery stenosis, moderate mitral regurgitation, moderate tricuspid regurgitation, grade III diastolic dysfunction, CKD stage III, osteoporosis, a history of a malignant neoplasm of the tongue with radiation induced esophageal stricture, hypothyroidism, and overweight BMI. She presented to undergo a DINORA and cardioversion on 7/6/2023; however, this was aborted when she presented in sinus rhythm. She remains in sinus rhythm on ECG assessment today maintained on amiodarone and apixaban therapy.      - We discussed the pathophysiology of atrial fibrillation; specifically, we discussed persistent atrial fibrillation and the concept that the longer a patient remains in atrial fibrillation, the more challenging rhythm restoration and maintenance of sinus rhythm may become. We discussed that atrial fibrillation has an increased risk of CVA.  - She remains in sinus rhythm with first degree AV block on ECG today, and per her [employer] dread she has not recorded any subsequent events of atrial fibrillation since starting amiodarone therapy. She has severe left atrial enlargement on echocardiogram with a repeat echocardiogram ordered to continue to assess.    - She is not a candidate for class Ic antiarrhythmic agents given her history of carotid artery stenosis, GIIIDD, and baseline RBBB with QRSd of 136ms. She is not a candidate for class III antiarrhythmics in the setting of CKD stage III. Her previously prescribed flecainide was safely discontinued, and she remains on amiodarone 200mg po daily  with excellent control of her atrial fibrillation.   - She is presently not maintained on rate control, with consideration for beta-blocker initiation in the event she has an increased burden of atrial fibrillation in the future or poor rate control while in AF.   - Her [id number] is 4 (HTN, carotid artery disease, female gender, age 65-74), portending an annual adjusted risk of CVA of 4%. She remains on uninterrupted apixaban therapy with no bleeding events reported.   - We discussed the possibility of catheter ablation with pulmonary vein isolation should she continue to have symptoms and AF despite AAD therapy. She voices understanding, although she would like to continue rhythm control with medication at this time. We discussed that with a history of persistent atrial fibrillation, the success rates with radiofrequency catheter ablation are reduced.     - Possible underlying drivers of atrial fibrillation were addressed at this appointment, including recommendations for maintenance of a healthy BMI - now a class I recommendation. Review of laboratory data revealed elevated TSH at 4.76, with no FT4 on record. A repeat TSH and FT4 have been ordered for laboratory draw today. A request for sleep study was previously placed today to evaluate for possible underlying obstructive sleep apnea.      This patient will return to clinic in six months with ongoing amiodarone for antiarrhythmic therapy and continued apixaban oral anticoagulation. All questions and concerns were addressed at this encounter.     Signing Physician:       JEREMY Reagan MD  Electrophysiology Attending

## 2023-11-01 ENCOUNTER — OFFICE VISIT (OUTPATIENT)
Dept: PRIMARY CARE CLINIC | Facility: CLINIC | Age: 73
End: 2023-11-01
Payer: MEDICARE

## 2023-11-01 ENCOUNTER — HOSPITAL ENCOUNTER (OUTPATIENT)
Dept: CARDIOLOGY | Facility: HOSPITAL | Age: 73
Discharge: HOME | End: 2023-11-01
Attending: INTERNAL MEDICINE
Payer: MEDICARE

## 2023-11-01 VITALS
WEIGHT: 182.56 LBS | DIASTOLIC BLOOD PRESSURE: 72 MMHG | HEIGHT: 69 IN | SYSTOLIC BLOOD PRESSURE: 138 MMHG | OXYGEN SATURATION: 98 % | BODY MASS INDEX: 27.04 KG/M2 | HEART RATE: 74 BPM

## 2023-11-01 VITALS
SYSTOLIC BLOOD PRESSURE: 135 MMHG | WEIGHT: 181 LBS | HEART RATE: 80 BPM | BODY MASS INDEX: 26.81 KG/M2 | DIASTOLIC BLOOD PRESSURE: 80 MMHG | HEIGHT: 69 IN

## 2023-11-01 DIAGNOSIS — C02.9 MALIGNANT NEOPLASM OF TONGUE: ICD-10-CM

## 2023-11-01 DIAGNOSIS — I48.0 PAROXYSMAL ATRIAL FIBRILLATION: ICD-10-CM

## 2023-11-01 DIAGNOSIS — E03.8 SUBCLINICAL HYPOTHYROIDISM: Primary | ICD-10-CM

## 2023-11-01 DIAGNOSIS — M81.0 AGE-RELATED OSTEOPOROSIS WITHOUT CURRENT PATHOLOGICAL FRACTURE: ICD-10-CM

## 2023-11-01 DIAGNOSIS — R01.1 SYSTOLIC MURMUR: ICD-10-CM

## 2023-11-01 DIAGNOSIS — Z12.31 ENCOUNTER FOR SCREENING MAMMOGRAM FOR MALIGNANT NEOPLASM OF BREAST: ICD-10-CM

## 2023-11-01 DIAGNOSIS — I65.23 CAROTID STENOSIS, ASYMPTOMATIC, BILATERAL: ICD-10-CM

## 2023-11-01 LAB
ASCENDING AORTA: 3.35 CM
AV INDEX (PROSTH): 0.81
AV MEAN GRADIENT: 4 MMHG
AV PEAK GRADIENT: 7 MMHG
AV VALVE AREA BY VELOCITY RATIO: 3.78 CM²
AV VALVE AREA: 3.61 CM²
AV VELOCITY RATIO: 0.85
BSA FOR ECHO PROCEDURE: 2 M2
CV ECHO LV RWT: 0.45 CM
DOP CALC AO PEAK VEL: 1.33 M/S
DOP CALC AO VTI: 38.45 CM
DOP CALC LVOT AREA: 4.4 CM2
DOP CALC LVOT DIAMETER: 2.38 CM
DOP CALC LVOT PEAK VEL: 1.13 M/S
DOP CALC LVOT STROKE VOLUME: 138.73 CM3
DOP CALC MV VTI: 52.61 CM
DOP CALCLVOT PEAK VEL VTI: 31.2 CM
E WAVE DECELERATION TIME: 158.26 MSEC
E/A RATIO: 1.93
E/E' RATIO: 32.8 M/S
ECHO LV POSTERIOR WALL: 1 CM (ref 0.6–1.1)
FRACTIONAL SHORTENING: 30 % (ref 28–44)
INTERVENTRICULAR SEPTUM: 1.17 CM (ref 0.6–1.1)
LA MAJOR: 4.95 CM
LA MINOR: 4.81 CM
LA WIDTH: 3.75 CM
LEFT ARM DIASTOLIC BLOOD PRESSURE: 70 MMHG
LEFT ARM SYSTOLIC BLOOD PRESSURE: 130 MMHG
LEFT ATRIUM SIZE: 4.49 CM
LEFT ATRIUM VOLUME INDEX MOD: 28.7 ML/M2
LEFT ATRIUM VOLUME INDEX: 35.3 ML/M2
LEFT ATRIUM VOLUME MOD: 56.73 CM3
LEFT ATRIUM VOLUME: 69.83 CM3
LEFT CBA DIAS: 17 CM/S
LEFT CBA SYS: 116 CM/S
LEFT CCA DIST DIAS: 22 CM/S
LEFT CCA DIST SYS: 200 CM/S
LEFT CCA MID DIAS: 24 CM/S
LEFT CCA MID SYS: 190 CM/S
LEFT CCA PROX DIAS: 11 CM/S
LEFT CCA PROX SYS: 73 CM/S
LEFT ECA DIAS: 10 CM/S
LEFT ECA SYS: 120 CM/S
LEFT ICA DIST DIAS: 16 CM/S
LEFT ICA DIST SYS: 55 CM/S
LEFT ICA MID DIAS: 18 CM/S
LEFT ICA MID SYS: 69 CM/S
LEFT ICA PROX DIAS: 14 CM/S
LEFT ICA PROX SYS: 70 CM/S
LEFT INTERNAL DIMENSION IN SYSTOLE: 3.14 CM (ref 2.1–4)
LEFT VENTRICLE DIASTOLIC VOLUME INDEX: 46.2 ML/M2
LEFT VENTRICLE DIASTOLIC VOLUME: 91.48 ML
LEFT VENTRICLE MASS INDEX: 86 G/M2
LEFT VENTRICLE SYSTOLIC VOLUME INDEX: 19.8 ML/M2
LEFT VENTRICLE SYSTOLIC VOLUME: 39.12 ML
LEFT VENTRICULAR INTERNAL DIMENSION IN DIASTOLE: 4.48 CM (ref 3.5–6)
LEFT VENTRICULAR MASS: 170.46 G
LEFT VERTEBRAL DIAS: 9 CM/S
LEFT VERTEBRAL SYS: 60 CM/S
LV LATERAL E/E' RATIO: 32.8 M/S
LV SEPTAL E/E' RATIO: 32.8 M/S
MV A" WAVE DURATION": 12.94 MSEC
MV MEAN GRADIENT: 5 MMHG
MV PEAK A VEL: 0.85 M/S
MV PEAK E VEL: 1.64 M/S
MV PEAK GRADIENT: 15 MMHG
MV STENOSIS PRESSURE HALF TIME: 45.89 MS
MV VALVE AREA BY CONTINUITY EQUATION: 2.64 CM2
MV VALVE AREA P 1/2 METHOD: 4.79 CM2
OHS CV CAROTID RIGHT ICA EDV HIGHEST: 17
OHS CV CAROTID ULTRASOUND LEFT ICA/CCA RATIO: 0.35
OHS CV CAROTID ULTRASOUND RIGHT ICA/CCA RATIO: 0.97
OHS CV PV CAROTID LEFT HIGHEST CCA: 200
OHS CV PV CAROTID LEFT HIGHEST ICA: 70
OHS CV PV CAROTID RIGHT HIGHEST CCA: 138
OHS CV PV CAROTID RIGHT HIGHEST ICA: 134
OHS CV US CAROTID LEFT HIGHEST EDV: 18
PISA MRMAX VEL: 0.05 M/S
PISA TR MAX VEL: 3.66 M/S
PULM VEIN S/D RATIO: 1.05
PV PEAK D VEL: 0.38 M/S
PV PEAK S VEL: 0.4 M/S
RA MAJOR: 4.27 CM
RA PRESSURE ESTIMATED: 3 MMHG
RA WIDTH: 2.8 CM
RIGHT ARM DIASTOLIC BLOOD PRESSURE: 70 MMHG
RIGHT ARM SYSTOLIC BLOOD PRESSURE: 130 MMHG
RIGHT CBA DIAS: 21 CM/S
RIGHT CBA SYS: 138 CM/S
RIGHT CCA DIST DIAS: 19 CM/S
RIGHT CCA DIST SYS: 138 CM/S
RIGHT CCA MID DIAS: 15 CM/S
RIGHT CCA MID SYS: 101 CM/S
RIGHT CCA PROX DIAS: 11 CM/S
RIGHT CCA PROX SYS: 57 CM/S
RIGHT ECA DIAS: 11 CM/S
RIGHT ECA SYS: 162 CM/S
RIGHT ICA DIST DIAS: 13 CM/S
RIGHT ICA DIST SYS: 53 CM/S
RIGHT ICA MID DIAS: 12 CM/S
RIGHT ICA MID SYS: 104 CM/S
RIGHT ICA PROX DIAS: 17 CM/S
RIGHT ICA PROX SYS: 134 CM/S
RIGHT VENTRICULAR END-DIASTOLIC DIMENSION: 3.35 CM
RIGHT VERTEBRAL DIAS: 13 CM/S
RIGHT VERTEBRAL SYS: 63 CM/S
RV TB RVSP: 7 MMHG
SINUS: 2.96 CM
STJ: 3.15 CM
TDI LATERAL: 0.05 M/S
TDI SEPTAL: 0.05 M/S
TDI: 0.05 M/S
TR MAX PG: 54 MMHG
TRICUSPID ANNULAR PLANE SYSTOLIC EXCURSION: 2.33 CM
TV REST PULMONARY ARTERY PRESSURE: 57 MMHG
Z-SCORE OF LEFT VENTRICULAR DIMENSION IN END DIASTOLE: -2.44
Z-SCORE OF LEFT VENTRICULAR DIMENSION IN END SYSTOLE: -0.88

## 2023-11-01 PROCEDURE — 1160F RVW MEDS BY RX/DR IN RCRD: CPT | Mod: CPTII,S$GLB,, | Performed by: STUDENT IN AN ORGANIZED HEALTH CARE EDUCATION/TRAINING PROGRAM

## 2023-11-01 PROCEDURE — 3078F PR MOST RECENT DIASTOLIC BLOOD PRESSURE < 80 MM HG: ICD-10-PCS | Mod: CPTII,S$GLB,, | Performed by: STUDENT IN AN ORGANIZED HEALTH CARE EDUCATION/TRAINING PROGRAM

## 2023-11-01 PROCEDURE — 93306 TTE W/DOPPLER COMPLETE: CPT

## 2023-11-01 PROCEDURE — 3075F PR MOST RECENT SYSTOLIC BLOOD PRESS GE 130-139MM HG: ICD-10-PCS | Mod: CPTII,S$GLB,, | Performed by: STUDENT IN AN ORGANIZED HEALTH CARE EDUCATION/TRAINING PROGRAM

## 2023-11-01 PROCEDURE — 99999 PR PBB SHADOW E&M-EST. PATIENT-LVL III: ICD-10-PCS | Mod: PBBFAC,,, | Performed by: STUDENT IN AN ORGANIZED HEALTH CARE EDUCATION/TRAINING PROGRAM

## 2023-11-01 PROCEDURE — 99214 PR OFFICE/OUTPT VISIT, EST, LEVL IV, 30-39 MIN: ICD-10-PCS | Mod: S$GLB,,, | Performed by: STUDENT IN AN ORGANIZED HEALTH CARE EDUCATION/TRAINING PROGRAM

## 2023-11-01 PROCEDURE — 99499 UNLISTED E&M SERVICE: CPT | Mod: S$GLB,,, | Performed by: STUDENT IN AN ORGANIZED HEALTH CARE EDUCATION/TRAINING PROGRAM

## 2023-11-01 PROCEDURE — 4010F ACE/ARB THERAPY RXD/TAKEN: CPT | Mod: CPTII,S$GLB,, | Performed by: STUDENT IN AN ORGANIZED HEALTH CARE EDUCATION/TRAINING PROGRAM

## 2023-11-01 PROCEDURE — 99999 PR PBB SHADOW E&M-EST. PATIENT-LVL III: CPT | Mod: PBBFAC,,, | Performed by: STUDENT IN AN ORGANIZED HEALTH CARE EDUCATION/TRAINING PROGRAM

## 2023-11-01 PROCEDURE — 3008F PR BODY MASS INDEX (BMI) DOCUMENTED: ICD-10-PCS | Mod: CPTII,S$GLB,, | Performed by: STUDENT IN AN ORGANIZED HEALTH CARE EDUCATION/TRAINING PROGRAM

## 2023-11-01 PROCEDURE — 3078F DIAST BP <80 MM HG: CPT | Mod: CPTII,S$GLB,, | Performed by: STUDENT IN AN ORGANIZED HEALTH CARE EDUCATION/TRAINING PROGRAM

## 2023-11-01 PROCEDURE — 93306 ECHO (CUPID ONLY): ICD-10-PCS | Mod: 26,,, | Performed by: INTERNAL MEDICINE

## 2023-11-01 PROCEDURE — 4010F PR ACE/ARB THEARPY RXD/TAKEN: ICD-10-PCS | Mod: CPTII,S$GLB,, | Performed by: STUDENT IN AN ORGANIZED HEALTH CARE EDUCATION/TRAINING PROGRAM

## 2023-11-01 PROCEDURE — 3008F BODY MASS INDEX DOCD: CPT | Mod: CPTII,S$GLB,, | Performed by: STUDENT IN AN ORGANIZED HEALTH CARE EDUCATION/TRAINING PROGRAM

## 2023-11-01 PROCEDURE — 3288F FALL RISK ASSESSMENT DOCD: CPT | Mod: CPTII,S$GLB,, | Performed by: STUDENT IN AN ORGANIZED HEALTH CARE EDUCATION/TRAINING PROGRAM

## 2023-11-01 PROCEDURE — 93880 CV US DOPPLER CAROTID (CUPID ONLY): ICD-10-PCS | Mod: 26,,, | Performed by: INTERNAL MEDICINE

## 2023-11-01 PROCEDURE — 1101F PR PT FALLS ASSESS DOC 0-1 FALLS W/OUT INJ PAST YR: ICD-10-PCS | Mod: CPTII,S$GLB,, | Performed by: STUDENT IN AN ORGANIZED HEALTH CARE EDUCATION/TRAINING PROGRAM

## 2023-11-01 PROCEDURE — 1159F PR MEDICATION LIST DOCUMENTED IN MEDICAL RECORD: ICD-10-PCS | Mod: CPTII,S$GLB,, | Performed by: STUDENT IN AN ORGANIZED HEALTH CARE EDUCATION/TRAINING PROGRAM

## 2023-11-01 PROCEDURE — 93306 TTE W/DOPPLER COMPLETE: CPT | Mod: 26,,, | Performed by: INTERNAL MEDICINE

## 2023-11-01 PROCEDURE — 1159F MED LIST DOCD IN RCRD: CPT | Mod: CPTII,S$GLB,, | Performed by: STUDENT IN AN ORGANIZED HEALTH CARE EDUCATION/TRAINING PROGRAM

## 2023-11-01 PROCEDURE — 93880 EXTRACRANIAL BILAT STUDY: CPT | Mod: 26,,, | Performed by: INTERNAL MEDICINE

## 2023-11-01 PROCEDURE — 1160F PR REVIEW ALL MEDS BY PRESCRIBER/CLIN PHARMACIST DOCUMENTED: ICD-10-PCS | Mod: CPTII,S$GLB,, | Performed by: STUDENT IN AN ORGANIZED HEALTH CARE EDUCATION/TRAINING PROGRAM

## 2023-11-01 PROCEDURE — 99214 OFFICE O/P EST MOD 30 MIN: CPT | Mod: S$GLB,,, | Performed by: STUDENT IN AN ORGANIZED HEALTH CARE EDUCATION/TRAINING PROGRAM

## 2023-11-01 PROCEDURE — 3288F PR FALLS RISK ASSESSMENT DOCUMENTED: ICD-10-PCS | Mod: CPTII,S$GLB,, | Performed by: STUDENT IN AN ORGANIZED HEALTH CARE EDUCATION/TRAINING PROGRAM

## 2023-11-01 PROCEDURE — 1101F PT FALLS ASSESS-DOCD LE1/YR: CPT | Mod: CPTII,S$GLB,, | Performed by: STUDENT IN AN ORGANIZED HEALTH CARE EDUCATION/TRAINING PROGRAM

## 2023-11-01 PROCEDURE — 3075F SYST BP GE 130 - 139MM HG: CPT | Mod: CPTII,S$GLB,, | Performed by: STUDENT IN AN ORGANIZED HEALTH CARE EDUCATION/TRAINING PROGRAM

## 2023-11-01 PROCEDURE — 93880 EXTRACRANIAL BILAT STUDY: CPT

## 2023-11-01 PROCEDURE — 1126F AMNT PAIN NOTED NONE PRSNT: CPT | Mod: CPTII,S$GLB,, | Performed by: STUDENT IN AN ORGANIZED HEALTH CARE EDUCATION/TRAINING PROGRAM

## 2023-11-01 PROCEDURE — 1126F PR PAIN SEVERITY QUANTIFIED, NO PAIN PRESENT: ICD-10-PCS | Mod: CPTII,S$GLB,, | Performed by: STUDENT IN AN ORGANIZED HEALTH CARE EDUCATION/TRAINING PROGRAM

## 2023-11-01 RX ORDER — LEVOTHYROXINE SODIUM 75 UG/1
75 TABLET ORAL
Qty: 30 TABLET | Refills: 11 | Status: SHIPPED | OUTPATIENT
Start: 2023-11-01 | End: 2023-11-03 | Stop reason: SDUPTHER

## 2023-11-01 NOTE — PROGRESS NOTES
SUBJECTIVE     Chief Complaint   Patient presents with    Annual Exam    Establish Care       HPI  Juliana Mayorga is a very pleasant 73 y.o. female with HTN, paroxysmal Afib, subclinical hypothyroidism, osteoporosis, and malignant neoplasm of tongue (in remission) that presents for annual exam. Pt is establishing care with me today.    Pt is UTD on age appropriate CA screening. Colonoscopy done at [facility].    Family, social, surgical Hx reviewed     Subclinical hypothyroidism: Synthroid 75 mcg daily    Osteoporosis: Ca and vit d daily    Paroxysmal Afib/ HTN:  Eliquis 5mg bid and Amiodarone 200mg daily  Amlodipine 10 mg irbesartan 150 mg daily    Health Maintenance         Date Due Completion Date    Hepatitis C Screening Never done ---    TETANUS VACCINE Never done ---    Mammogram Never done ---    DEXA Scan Never done ---    Colorectal Cancer Screening Never done ---    RSV Vaccine (Age 60+) (1 - 1-dose 60+ series) Never done ---    Influenza Vaccine (1) 2023    COVID-19 Vaccine (2023- season) 2023    High Dose Statin 2024    Lipid Panel 2027              PAST MEDICAL HISTORY:  Past Medical History:   Diagnosis Date    Anticoagulant long-term use     Encounter for monitoring flecainide therapy 2022    Hypertension     Hypothyroidism, unspecified     Malignant neoplasm of tongue     Paroxysmal atrial fibrillation     Radiation-induced esophageal stricture        PAST SURGICAL HISTORY:  Past Surgical History:   Procedure Laterality Date     egd   once per month at [facility]      ADENOIDECTOMY  1968    BREAST LUMPECTOMY Right 2013    CARDIOVERSION       SECTION  ,    FRACTURE SURGERY  2014    TONSILLECTOMY  1968    TUBAL LIGATION         SOCIAL HISTORY:  Social History     Socioeconomic History    Marital status:    Tobacco Use    Smoking status: Former     Current packs/day: 0.00     Types: Cigarettes      Quit date: 2005     Years since quittin.8    Smokeless tobacco: Never   Substance and Sexual Activity    Alcohol use: Yes     Alcohol/week: 3.0 standard drinks of alcohol     Types: 3 Cans of beer per week       FAMILY HISTORY:  Family History   Problem Relation Age of Onset    Alzheimer's disease Mother     Heart disease Mother     Cancer Father     Cancer Sister        ALLERGIES AND MEDICATIONS: updated and reviewed.  Review of patient's allergies indicates:   Allergen Reactions    Cefazolin      Other reaction(s): Extreme pain left leg and right arm  Other reaction(s): Extreme pain left leg and right arm     Current Outpatient Medications   Medication Sig Dispense Refill    amiodarone (PACERONE) 200 MG Tab Take 1 tablet (200 mg total) by mouth once daily. 90 tablet 2    amLODIPine (NORVASC) 10 MG tablet Take 1 tablet (10 mg total) by mouth nightly. 90 tablet 3    ELIQUIS 5 mg Tab Take 1 tablet (5 mg total) by mouth 2 (two) times daily. 60 tablet 11    esomeprazole (NEXIUM) 40 MG capsule Take 40 mg by mouth once daily.       eszopiclone 3 mg Tab Take 1 tablet (3 mg total) by mouth every evening. 90 tablet 1    irbesartan (AVAPRO) 150 MG tablet Take 1 tablet (150 mg total) by mouth every evening. 90 tablet 3    rosuvastatin (CRESTOR) 20 MG tablet Take 1 tablet (20 mg total) by mouth every evening. 90 tablet 3    levothyroxine (SYNTHROID) 75 MCG tablet Take 1 tablet (75 mcg total) by mouth before breakfast. 90 tablet 3     No current facility-administered medications for this visit.       ROS  Review of Systems   Constitutional:  Negative for fever and weight loss.   Respiratory:  Negative for cough and shortness of breath.    Cardiovascular:  Negative for chest pain and palpitations.   Gastrointestinal:  Negative for abdominal pain, constipation, diarrhea, nausea and vomiting.   Genitourinary:  Negative for dysuria.   Musculoskeletal:  Negative for back pain and joint pain.   Skin:  Negative for rash.  "  Neurological:  Negative for dizziness, weakness and headaches.   Psychiatric/Behavioral:  Negative for depression. The patient is not nervous/anxious.          OBJECTIVE     Physical Exam  Vitals:    11/01/23 1006   BP: 138/72   Pulse: 74    Body mass index is 26.96 kg/m².  Weight: 82.8 kg (182 lb 8.7 oz)   Height: 5' 9" (175.3 cm)     Physical Exam  HENT:      Head: Normocephalic and atraumatic.      Nose: Nose normal.      Mouth/Throat:      Mouth: Mucous membranes are moist.      Pharynx: Oropharynx is clear.   Eyes:      Extraocular Movements: Extraocular movements intact.      Conjunctiva/sclera: Conjunctivae normal.      Pupils: Pupils are equal, round, and reactive to light.   Cardiovascular:      Rate and Rhythm: Normal rate and regular rhythm.   Pulmonary:      Effort: Pulmonary effort is normal.      Breath sounds: Normal breath sounds.   Musculoskeletal:         General: No swelling. Normal range of motion.      Cervical back: Normal range of motion.      Right lower leg: No edema.      Left lower leg: No edema.   Skin:     General: Skin is warm.      Findings: No lesion or rash.   Neurological:      General: No focal deficit present.      Mental Status: She is alert and oriented to person, place, and time.      Motor: No weakness.   Psychiatric:         Mood and Affect: Mood normal.         Thought Content: Thought content normal.               ASSESSMENT     73 y.o. female with     1. Subclinical hypothyroidism    2. Malignant neoplasm of tongue    3. Age-related osteoporosis without current pathological fracture    4. Encounter for screening mammogram for malignant neoplasm of breast    5. Paroxysmal atrial fibrillation        PLAN:     1. Subclinical hypothyroidism  -     Discontinue: levothyroxine (SYNTHROID) 75 MCG tablet; Take 1 tablet (75 mcg total) by mouth before breakfast.  Dispense: 30 tablet; Refill: 11  Stable on medications, continue regimen    2. Malignant neoplasm of " tongue  Overview:  2006  Chemo and radiation, in remission      3. Age-related osteoporosis without current pathological fracture  Requesting DEXA records from [facility]    4. Encounter for screening mammogram for malignant neoplasm of breast  -     Mammo Digital Screening Bilat w/ [facility]; Future; Expected date: 11/01/2023    5. Paroxysmal atrial fibrillation  Stable on medications, continue regimen        Discussed age and gender appropriate screenings at this visit and encouraged a healthy diet low in simple carbohydrates, and increased physical activity.  Counseled on medically appropriate vaccines based on age and current health condition.  Screening test reviewed and discussed with patient.      RTC in 6 months    Gale Tapia MD

## 2023-11-03 ENCOUNTER — PATIENT MESSAGE (OUTPATIENT)
Dept: PRIMARY CARE CLINIC | Facility: CLINIC | Age: 73
End: 2023-11-03
Payer: MEDICARE

## 2023-11-03 DIAGNOSIS — E03.8 SUBCLINICAL HYPOTHYROIDISM: ICD-10-CM

## 2023-11-03 RX ORDER — LEVOTHYROXINE SODIUM 75 UG/1
75 TABLET ORAL
Qty: 90 TABLET | Refills: 3 | Status: SHIPPED | OUTPATIENT
Start: 2023-11-03 | End: 2023-11-24 | Stop reason: SDUPTHER

## 2023-11-07 ENCOUNTER — OFFICE VISIT (OUTPATIENT)
Dept: CARDIOLOGY | Facility: CLINIC | Age: 73
End: 2023-11-07
Payer: MEDICARE

## 2023-11-07 ENCOUNTER — PATIENT MESSAGE (OUTPATIENT)
Dept: ADMINISTRATIVE | Facility: HOSPITAL | Age: 73
End: 2023-11-07
Payer: MEDICARE

## 2023-11-07 VITALS
WEIGHT: 181.88 LBS | SYSTOLIC BLOOD PRESSURE: 127 MMHG | BODY MASS INDEX: 26.86 KG/M2 | HEART RATE: 66 BPM | DIASTOLIC BLOOD PRESSURE: 71 MMHG

## 2023-11-07 DIAGNOSIS — Z79.01 LONG TERM CURRENT USE OF ANTICOAGULANT: ICD-10-CM

## 2023-11-07 DIAGNOSIS — I51.7 LEFT VENTRICULAR HYPERTROPHY: ICD-10-CM

## 2023-11-07 DIAGNOSIS — I34.1 MITRAL VALVE PROLAPSE: ICD-10-CM

## 2023-11-07 DIAGNOSIS — I65.23 CAROTID STENOSIS, ASYMPTOMATIC, BILATERAL: ICD-10-CM

## 2023-11-07 DIAGNOSIS — I10 PRIMARY HYPERTENSION: ICD-10-CM

## 2023-11-07 DIAGNOSIS — E78.00 PURE HYPERCHOLESTEROLEMIA: ICD-10-CM

## 2023-11-07 DIAGNOSIS — I45.10 RBBB: ICD-10-CM

## 2023-11-07 DIAGNOSIS — I48.0 PAROXYSMAL ATRIAL FIBRILLATION: Primary | ICD-10-CM

## 2023-11-07 DIAGNOSIS — E03.8 SUBCLINICAL HYPOTHYROIDISM: ICD-10-CM

## 2023-11-07 PROBLEM — I48.19 PERSISTENT ATRIAL FIBRILLATION: Status: ACTIVE | Noted: 2023-07-05

## 2023-11-07 PROCEDURE — 1126F PR PAIN SEVERITY QUANTIFIED, NO PAIN PRESENT: ICD-10-PCS | Mod: CPTII,S$GLB,, | Performed by: INTERNAL MEDICINE

## 2023-11-07 PROCEDURE — 1101F PR PT FALLS ASSESS DOC 0-1 FALLS W/OUT INJ PAST YR: ICD-10-PCS | Mod: CPTII,S$GLB,, | Performed by: INTERNAL MEDICINE

## 2023-11-07 PROCEDURE — 3008F PR BODY MASS INDEX (BMI) DOCUMENTED: ICD-10-PCS | Mod: CPTII,S$GLB,, | Performed by: INTERNAL MEDICINE

## 2023-11-07 PROCEDURE — 3288F FALL RISK ASSESSMENT DOCD: CPT | Mod: CPTII,S$GLB,, | Performed by: INTERNAL MEDICINE

## 2023-11-07 PROCEDURE — 99999 PR PBB SHADOW E&M-EST. PATIENT-LVL III: ICD-10-PCS | Mod: PBBFAC,,, | Performed by: INTERNAL MEDICINE

## 2023-11-07 PROCEDURE — 1159F PR MEDICATION LIST DOCUMENTED IN MEDICAL RECORD: ICD-10-PCS | Mod: CPTII,S$GLB,, | Performed by: INTERNAL MEDICINE

## 2023-11-07 PROCEDURE — 3074F SYST BP LT 130 MM HG: CPT | Mod: CPTII,S$GLB,, | Performed by: INTERNAL MEDICINE

## 2023-11-07 PROCEDURE — 3078F PR MOST RECENT DIASTOLIC BLOOD PRESSURE < 80 MM HG: ICD-10-PCS | Mod: CPTII,S$GLB,, | Performed by: INTERNAL MEDICINE

## 2023-11-07 PROCEDURE — 4010F ACE/ARB THERAPY RXD/TAKEN: CPT | Mod: CPTII,S$GLB,, | Performed by: INTERNAL MEDICINE

## 2023-11-07 PROCEDURE — 3288F PR FALLS RISK ASSESSMENT DOCUMENTED: ICD-10-PCS | Mod: CPTII,S$GLB,, | Performed by: INTERNAL MEDICINE

## 2023-11-07 PROCEDURE — 99999 PR PBB SHADOW E&M-EST. PATIENT-LVL III: CPT | Mod: PBBFAC,,, | Performed by: INTERNAL MEDICINE

## 2023-11-07 PROCEDURE — 1159F MED LIST DOCD IN RCRD: CPT | Mod: CPTII,S$GLB,, | Performed by: INTERNAL MEDICINE

## 2023-11-07 PROCEDURE — 3074F PR MOST RECENT SYSTOLIC BLOOD PRESSURE < 130 MM HG: ICD-10-PCS | Mod: CPTII,S$GLB,, | Performed by: INTERNAL MEDICINE

## 2023-11-07 PROCEDURE — 3078F DIAST BP <80 MM HG: CPT | Mod: CPTII,S$GLB,, | Performed by: INTERNAL MEDICINE

## 2023-11-07 PROCEDURE — 3008F BODY MASS INDEX DOCD: CPT | Mod: CPTII,S$GLB,, | Performed by: INTERNAL MEDICINE

## 2023-11-07 PROCEDURE — 99214 OFFICE O/P EST MOD 30 MIN: CPT | Mod: S$GLB,,, | Performed by: INTERNAL MEDICINE

## 2023-11-07 PROCEDURE — 99214 PR OFFICE/OUTPT VISIT, EST, LEVL IV, 30-39 MIN: ICD-10-PCS | Mod: S$GLB,,, | Performed by: INTERNAL MEDICINE

## 2023-11-07 PROCEDURE — 4010F PR ACE/ARB THEARPY RXD/TAKEN: ICD-10-PCS | Mod: CPTII,S$GLB,, | Performed by: INTERNAL MEDICINE

## 2023-11-07 PROCEDURE — 1126F AMNT PAIN NOTED NONE PRSNT: CPT | Mod: CPTII,S$GLB,, | Performed by: INTERNAL MEDICINE

## 2023-11-07 PROCEDURE — 1101F PT FALLS ASSESS-DOCD LE1/YR: CPT | Mod: CPTII,S$GLB,, | Performed by: INTERNAL MEDICINE

## 2023-11-07 NOTE — PROGRESS NOTES
Subjective:   11/07/2023     Patient ID:  Juliana Mayorga is a 73 y.o. female who presents for evaulation of Results      Comes in for follow-up, does have hypertension, generally at home it is fairly well controlled on therapy with amlodipine and irbesartan.      She has not had chest pains or tightness, PND or orthopnea.  She does not have a history of coronary artery disease.     Paroxysmal atrial fibrillation is present, seen by EP, not felt to be a candidate for antiarrhythmic therapy other than amiodarone. Now in sinus rhythm.    A lipid profile in 2021 showed a total cholesterol 308, ,  and triglycerides 57.  A repeat in mid 2023 showed a total cholesterol of 216, triglycerides 90, HDL is 94 and .  On rosuvastatin 20 mg daily.    She is anticoagulated with Eliquis, she does not have bleeding problems.    Carotid ultrasound showed no progression of disease:   Bilateral carotid atherosclerosis is present.  Bilateral common carotid arteries demonstrate 50% stenosis.  No evidence of significant ICA stenosis (less than 50%) bilaterally.  Vertebral flow is antegrade bilaterally.      Recent echocardiogram:   ·  Left Ventricle: The left ventricle is normal in size. There is mild concentric hypertrophy. Normal wall motion. There is normal systolic function with a visually estimated ejection fraction of 60 - 65%.  ·  Right Ventricle: Normal right ventricular cavity size. Wall thickness is normal. Right ventricle wall motion  is normal. Systolic function is normal.  ·  Left Atrium: Left atrium is moderately dilated.  ·  Mitral Valve: Mildly thickened leaflets. There is mild to moderate stenosis. The mean pressure gradient across the mitral valve is 5 mmHg at a heart rate of 65 bpm. There is mild to moderate regurgitation with a posterolateral eccentriccally directed jet.  ·  IVC/SVC: Normal venous pressure at 3 mmHg.          Hypertension  Pertinent negatives include no chest pain,  headaches, malaise/fatigue, orthopnea, palpitations, PND or shortness of breath.       Patient Active Problem List   Diagnosis    Insomnia due to medical condition    Primary hypertension    Left ventricular hypertrophy    Malignant neoplasm of tongue    Mitral valve prolapse    Osteoporosis    Paroxysmal atrial fibrillation    Radiation-induced esophageal stricture    Subclinical hypothyroidism    Long-term current use of high risk medication other than anticoagulant    Anticoagulation management encounter    Carotid stenosis, asymptomatic, bilateral    Long term current use of anticoagulant    Pure hypercholesterolemia    CKD (chronic kidney disease), stage III    RBBB    Longstanding persistent atrial fibrillation        Past Medical History:   Diagnosis Date    Anticoagulant long-term use     Encounter for monitoring flecainide therapy 2022    Hypertension     Hypothyroidism, unspecified     Malignant neoplasm of tongue     Paroxysmal atrial fibrillation     Radiation-induced esophageal stricture          Past Surgical History:   Procedure Laterality Date     egd   once per month at [facility]      ADENOIDECTOMY  1968    BREAST LUMPECTOMY Right 2013    CARDIOVERSION       SECTION  ,    FRACTURE SURGERY      TONSILLECTOMY  1968    TUBAL LIGATION         Review of patient's allergies indicates:   Allergen Reactions    Cefazolin      Other reaction(s): Extreme pain left leg and right arm  Other reaction(s): Extreme pain left leg and right arm         Current Outpatient Medications:     amiodarone (PACERONE) 200 MG Tab, Take 1 tablet (200 mg total) by mouth once daily., Disp: 90 tablet, Rfl: 2    amLODIPine (NORVASC) 10 MG tablet, Take 1 tablet (10 mg total) by mouth nightly., Disp: 90 tablet, Rfl: 3    ELIQUIS 5 mg Tab, Take 1 tablet (5 mg total) by mouth 2 (two) times daily., Disp: 60 tablet, Rfl: 11    esomeprazole (NEXIUM) 40 MG capsule, Take 40 mg by mouth once daily. , Disp: , Rfl:      eszopiclone 3 mg Tab, Take 1 tablet (3 mg total) by mouth every evening., Disp: 90 tablet, Rfl: 1    irbesartan (AVAPRO) 150 MG tablet, Take 1 tablet (150 mg total) by mouth every evening., Disp: 90 tablet, Rfl: 3    levothyroxine (SYNTHROID) 75 MCG tablet, Take 1 tablet (75 mcg total) by mouth before breakfast., Disp: 90 tablet, Rfl: 3    rosuvastatin (CRESTOR) 20 MG tablet, Take 1 tablet (20 mg total) by mouth every evening., Disp: 90 tablet, Rfl: 3     Social History     Socioeconomic History    Marital status:    Tobacco Use    Smoking status: Former     Current packs/day: 0.00     Types: Cigarettes     Quit date: 2005     Years since quittin.8    Smokeless tobacco: Never   Substance and Sexual Activity    Alcohol use: Yes     Alcohol/week: 3.0 standard drinks of alcohol     Types: 3 Cans of beer per week       Family History   Problem Relation Age of Onset    Alzheimer's disease Mother     Heart disease Mother     Cancer Father     Cancer Sister         Objective:   Review of Systems   Constitutional: Negative for chills, decreased appetite, diaphoresis, fever, malaise/fatigue, weight gain and weight loss.   HENT:  Negative for congestion, hearing loss, nosebleeds and sore throat.    Eyes:  Negative for double vision, vision loss in left eye and vision loss in right eye.   Cardiovascular:  Negative for chest pain, claudication, cyanosis, dyspnea on exertion, irregular heartbeat, leg swelling, near-syncope, orthopnea, palpitations, paroxysmal nocturnal dyspnea and syncope.   Respiratory:  Negative for cough, hemoptysis, shortness of breath, sleep disturbances due to breathing, snoring and wheezing.    Endocrine: Negative for cold intolerance and heat intolerance.   Hematologic/Lymphatic: Negative for adenopathy and bleeding problem. Does not bruise/bleed easily.   Skin:  Negative for itching, nail changes and rash.   Musculoskeletal:  Negative for arthritis, back pain, falls and myalgias.    Gastrointestinal:  Negative for bloating, abdominal pain, anorexia, constipation, diarrhea, hematochezia, melena, nausea and vomiting.   Genitourinary:  Negative for frequency, hematuria and nocturia.   Neurological:  Negative for focal weakness, headaches, vertigo and weakness.   Psychiatric/Behavioral:  Negative for altered mental status, depression and memory loss.    Allergic/Immunologic: Negative for hives.       Vitals:    11/07/23 1316   BP: 127/71   Pulse: 66           Physical Exam  Vitals reviewed.   Constitutional:       General: She is not in acute distress.     Appearance: She is well-developed.   HENT:      Head: Normocephalic and atraumatic.      Nose: Nose normal.   Eyes:      Conjunctiva/sclera: Conjunctivae normal.      Pupils: Pupils are equal, round, and reactive to light.   Neck:      Vascular: Carotid bruit (left greater than right) present. No JVD.   Cardiovascular:      Rate and Rhythm: Normal rate and regular rhythm.      Pulses: Normal pulses and intact distal pulses.      Heart sounds: Murmur (Grade 2 systolic ejection murmur) heard.      No friction rub. No gallop.      Comments: Karlos venous pressure is normal  Pulmonary:      Effort: Pulmonary effort is normal. No respiratory distress.      Breath sounds: Normal breath sounds. No wheezing or rales.   Chest:      Chest wall: No tenderness.   Abdominal:      General: Bowel sounds are normal. There is no distension.      Palpations: Abdomen is soft.      Tenderness: There is no abdominal tenderness.   Musculoskeletal:         General: No tenderness or deformity. Normal range of motion.      Cervical back: Normal range of motion and neck supple.      Right lower leg: Edema ( Trace) present.      Left lower leg: Edema (Trace) present.   Skin:     General: Skin is warm and dry.      Findings: No erythema or rash.   Neurological:      Mental Status: She is alert and oriented to person, place, and time.      Cranial Nerves: No cranial nerve  deficit.      Motor: No abnormal muscle tone.      Coordination: Coordination normal.   Psychiatric:         Behavior: Behavior normal.         Thought Content: Thought content normal.         Judgment: Judgment normal.           Assessment and Plan:     Paroxysmal atrial fibrillation  Comments:  Currently in sinus rhythm on amiodarone  Follow-up with EP    Mitral valve prolapse    Primary hypertension  Comments:  Well controlled    Pure hypercholesterolemia  Comments:  Excellent on high-dose statin therapy    RBBB    Left ventricular hypertrophy    Carotid stenosis, asymptomatic, bilateral  Comments:  Asymptomatic, stable    Long term current use of anticoagulant  Comments:  Tolerates well    Subclinical hypothyroidism  Comments:  Continues on thyroid replacement therapy, not hyperthyroid.               Follow up in about 1 year (around 11/7/2024).

## 2023-11-08 ENCOUNTER — PATIENT OUTREACH (OUTPATIENT)
Dept: ADMINISTRATIVE | Facility: HOSPITAL | Age: 73
End: 2023-11-08
Payer: MEDICARE

## 2023-11-08 NOTE — LETTER
AUTHORIZATION FOR RELEASE OF   CONFIDENTIAL INFORMATION    Dear [facility] records,    We are seeing Juliana Mayorga, date of birth 1950, in the clinic at [facility]. Gale Tapia MD is the patient's PCP. Juliana Mayorga has an outstanding lab/procedure at the time we reviewed her chart. In order to help keep her health information updated, she has authorized us to request the following medical record(s):        (  )  MAMMOGRAM                                      ( X )  COLONOSCOPY      (  )  PAP SMEAR                                          (  )  OUTSIDE LAB RESULTS     (  )  DEXA SCAN                                          (  )  EYE EXAM            (  )  FOOT EXAM                                          (  )  ENTIRE RECORD     (  )  OUTSIDE IMMUNIZATIONS                 (  )  _______________         Please fax records to Gale Tapia MD, [phone]     If you have any questions, please contact HARRISON Reed at [phone].           Patient Name: Juliana Mayorga  : 1950  Patient Phone #: [phone]

## 2023-11-08 NOTE — PROGRESS NOTES
Health Maintenance Due   Topic Date Due    Hepatitis C Screening  Never done    TETANUS VACCINE  Never done    Mammogram  Never done    DEXA Scan  Never done    Colorectal Cancer Screening  Never done    RSV Vaccine (Age 60+) (1 - 1-dose 60+ series) Never done    Influenza Vaccine (1) 09/01/2023    COVID-19 Vaccine (8 - 2023-24 season) 09/01/2023     Triggered LINKS and reconciled immunizations. Updated Care Everywhere. Checked [facility] portal for outside  results- no results found. Record request sent to [facility] asking for a copy of pt's most recent mammogram and DEXA scan results. Record request sent to [facility] asking for a copy of pt's most recent colonoscopy results. .Chart review completed.

## 2023-11-08 NOTE — LETTER
AUTHORIZATION FOR RELEASE OF   CONFIDENTIAL INFORMATION    Dear [facility] records,    We are seeing Juliana Mayorga, date of birth 1950, in the clinic at [facility]. Gale Tapia MD is the patient's PCP. Juliana Mayorga has an outstanding lab/procedure at the time we reviewed her chart. In order to help keep her health information updated, she has authorized us to request the following medical record(s):        ( X )  MAMMOGRAM                                    (  )  COLONOSCOPY      (  )  PAP SMEAR                                          (  )  OUTSIDE LAB RESULTS     ( X )  DEXA SCAN                                        (  )  EYE EXAM            (  )  FOOT EXAM                                          (  )  ENTIRE RECORD     (  )  OUTSIDE IMMUNIZATIONS                  (  )  _______________         Please fax records to Gale Tapia MD, [phone]     If you have any questions, please contact HARRISON Reed at [phone].           Patient Name: Juliana Mayorga  : 1950  Patient Phone #: [phone]

## 2023-11-24 DIAGNOSIS — E03.8 SUBCLINICAL HYPOTHYROIDISM: ICD-10-CM

## 2023-11-24 RX ORDER — LEVOTHYROXINE SODIUM 75 UG/1
75 TABLET ORAL
Qty: 90 TABLET | Refills: 3 | Status: SHIPPED | OUTPATIENT
Start: 2023-11-24 | End: 2024-11-23

## 2024-01-03 DIAGNOSIS — I10 ESSENTIAL HYPERTENSION: Chronic | ICD-10-CM

## 2024-01-03 RX ORDER — APIXABAN 5 MG/1
5 TABLET, FILM COATED ORAL 2 TIMES DAILY
Qty: 60 TABLET | Refills: 11 | Status: SHIPPED | OUTPATIENT
Start: 2024-01-03 | End: 2024-01-03 | Stop reason: SDUPTHER

## 2024-01-03 RX ORDER — APIXABAN 5 MG/1
5 TABLET, FILM COATED ORAL 2 TIMES DAILY
Qty: 180 TABLET | Refills: 4 | Status: SHIPPED | OUTPATIENT
Start: 2024-01-03 | End: 2025-03-28

## 2024-01-05 DIAGNOSIS — G47.01 INSOMNIA DUE TO MEDICAL CONDITION: ICD-10-CM

## 2024-01-05 RX ORDER — ESZOPICLONE 3 MG/1
3 TABLET, FILM COATED ORAL NIGHTLY
Qty: 90 TABLET | Refills: 1 | Status: SHIPPED | OUTPATIENT
Start: 2024-01-05

## 2024-01-05 NOTE — TELEPHONE ENCOUNTER
----- Message from Mikaela Wheeler sent at 1/5/2024  3:51 PM CST -----  Contact: [phone]  Requesting an RX refill or new RX.  Is this a refill or new RX: refill 1  RX name and strength (copy/paste from chart):   eszopiclone 3 mg - lunesta  Is this a 30 day or 90 day RX:   Pharmacy name and phone # (copy/paste from chart):  [facility] [place], [place] - [address]   Phone: [phone]  Fax: [phone]        The doctors have asked that we provide their patients with the following 2 reminders -- prescription refills can take up to 72 hours, and a friendly reminder that in the future you can use your [facility] account to request refills:

## 2024-01-11 DIAGNOSIS — Z00.00 ENCOUNTER FOR MEDICARE ANNUAL WELLNESS EXAM: ICD-10-CM

## 2024-04-03 DIAGNOSIS — Z78.0 MENOPAUSE: ICD-10-CM

## 2024-04-22 ENCOUNTER — TELEPHONE (OUTPATIENT)
Dept: ELECTROPHYSIOLOGY | Facility: CLINIC | Age: 74
End: 2024-04-22
Payer: MEDICARE

## 2024-04-22 RX ORDER — AMIODARONE HYDROCHLORIDE 200 MG/1
200 TABLET ORAL DAILY
Qty: 30 TABLET | Refills: 1 | Status: SHIPPED | OUTPATIENT
Start: 2024-04-22 | End: 2024-04-24 | Stop reason: SDUPTHER

## 2024-04-24 RX ORDER — AMIODARONE HYDROCHLORIDE 200 MG/1
200 TABLET ORAL DAILY
Qty: 30 TABLET | Refills: 2 | Status: SHIPPED | OUTPATIENT
Start: 2024-04-24

## 2024-05-07 DIAGNOSIS — I10 PRIMARY HYPERTENSION: ICD-10-CM

## 2024-05-07 RX ORDER — IRBESARTAN 150 MG/1
150 TABLET ORAL NIGHTLY
Qty: 90 TABLET | Refills: 1 | Status: SHIPPED | OUTPATIENT
Start: 2024-05-07

## 2024-05-07 RX ORDER — IRBESARTAN 150 MG/1
150 TABLET ORAL NIGHTLY
Qty: 90 TABLET | Refills: 3 | Status: SHIPPED | OUTPATIENT
Start: 2024-05-07

## 2024-05-08 RX ORDER — IRBESARTAN 150 MG/1
150 TABLET ORAL NIGHTLY
Qty: 90 TABLET | Refills: 1 | Status: SHIPPED | OUTPATIENT
Start: 2024-05-08

## 2024-05-21 DIAGNOSIS — E78.00 PURE HYPERCHOLESTEROLEMIA: ICD-10-CM

## 2024-05-21 DIAGNOSIS — R09.89 BRUIT OF LEFT CAROTID ARTERY: ICD-10-CM

## 2024-05-21 RX ORDER — ROSUVASTATIN CALCIUM 20 MG/1
20 TABLET, COATED ORAL NIGHTLY
Qty: 90 TABLET | Refills: 3 | Status: SHIPPED | OUTPATIENT
Start: 2024-05-21 | End: 2025-05-21

## 2024-05-21 RX ORDER — ROSUVASTATIN CALCIUM 20 MG/1
20 TABLET, COATED ORAL NIGHTLY
Qty: 90 TABLET | Refills: 1 | Status: SHIPPED | OUTPATIENT
Start: 2024-05-21

## 2024-05-27 DIAGNOSIS — I10 PRIMARY HYPERTENSION: ICD-10-CM

## 2024-05-28 RX ORDER — AMLODIPINE BESYLATE 10 MG/1
10 TABLET ORAL NIGHTLY
Qty: 90 TABLET | Refills: 1 | Status: SHIPPED | OUTPATIENT
Start: 2024-05-28

## 2024-05-28 RX ORDER — AMLODIPINE BESYLATE 10 MG/1
10 TABLET ORAL NIGHTLY
Qty: 90 TABLET | Refills: 3 | Status: SHIPPED | OUTPATIENT
Start: 2024-05-28

## 2024-06-18 NOTE — PROGRESS NOTES
Ms. Mayorga is a patient of Dr. Reagan and was last seen in clinic 10/30/2023.      Subjective:   Patient ID:  Juliana Mayorga is a 73 y.o. female who presents for follow up of Atrial Fibrillation  .     HPI:    Ms. Mayorga is a 73 y.o. female with persistent atrial fibrillation, RBBB, HTN, HLD, bilateral carotid artery stenosis, moderate mitral regurgitation, mod tricuspid regurgitation, diastolic dysfunction, CKDIII, osteoporosis, tongue CA with radiation induced esophageal stricture, hypothyroid here for follow up.     Background:    Ms. Juliana Mayorga has a past medical history significant for persistent atrial fibrillation, baseline RBBB, hypertension, hyperlipidemia, bilateral carotid artery stenosis, moderate mitral regurgitation, moderate tricuspid regurgitation, grade III diastolic dysfunction, CKD stage III, osteoporosis, a history of a malignant neoplasm of the tongue with radiation induced esophageal stricture, hypothyroidism, and overweight BMI. She presented to undergo a DINORA and cardioversion on 7/6/2023; however, this was aborted when she presented in sinus rhythm. She remains in sinus rhythm on ECG assessment today maintained on amiodarone and apixaban therapy.      She was previously followed for several years with EP at [facility] with Dr. Platt. Per her report, she was originally diagnosed with atrial fibrillation in 2019, but reports that she had been experiencing episodes of palpitations and racing heart rates since approximately 2015. Dr. Platt initiated antiarrhythmic therapy with flecainide 100mg po BID, and when she remained in persistent atrial fibrillation with baseline RBBB QRSd of ~200ms, he increased her dose to flecainide 150mg po BID. She was started on apixaban for oral anticoagulation and denies any adverse bleeding events. She has a history of bilateral carotid artery stenosis, with preserved systolic function, LVEF 65% with GIIIDD. She  underwent a DINORA/cardioversion with Dr. Platt in early January, 2023, and reports that she almost immediately went back into atrial fibrillation. She is followed in general cardiology with Dr. Castanedaois has transitioned her care into the [facility]. At our prior encounters, we discussed the risks of flecainide therapy given her baseline RBBB with a QRSd of 136ms. This agent was discontinued and allowed to washout. She presented to undergo a DINORA and cardioversion on 7/6/2023; however, this was aborted when she presented in sinus rhythm. She was initiated on amiodarone therapy and remains in sinus rhythm on ECG assessment today. She remains on uninterrupted apixaban therapy with no adverse bleeding events reported.      She presents to clinic today with her . In discussion with Ms. Mayorga today, she tells me that she is feeling overall quite well. She has a [employer] mobile dread that has not captured any subsequent events of atrial fibrillation since she has started amiodarone therapy. She reports symptoms of mild exercise intolerance, palpitations, slightly worsened shortness of breath, and generalized fatigue while in atrial fibrillation and denies recurrent symptoms. She denies any episodes of dizziness, lightheadedness, syncope/presyncope, chest pain or chest discomfort, palpitations, nausea or vomiting, orthopnea, or PND. She and her  travel often and are anticipating going to [place]. She reports baseline left hearing loss. She reports baseline shortness of breath and dyspnea with exertion that are slightly worse while in atrial fibrillation; however, she feels that she has returned to her baseline. She can climb one flight of stairs prior to needing to take a break, and continues to attempt to increase her level of physical activity.     Ms. Juliana Mayorga is a cecilio 72-year-old woman who returns to clinic today for ongoing evaluation and recommendations regarding persistent  atrial fibrillation. She has a past medical history significant for persistent atrial fibrillation, baseline RBBB, hypertension, hyperlipidemia, bilateral carotid artery stenosis, moderate mitral regurgitation, moderate tricuspid regurgitation, grade III diastolic dysfunction, CKD stage III, osteoporosis, a history of a malignant neoplasm of the tongue with radiation induced esophageal stricture, hypothyroidism, and overweight BMI. She presented to undergo a DINORA and cardioversion on 7/6/2023; however, this was aborted when she presented in sinus rhythm. She remains in sinus rhythm on ECG assessment today maintained on amiodarone and apixaban therapy.      - We discussed the pathophysiology of atrial fibrillation; specifically, we discussed persistent atrial fibrillation and the concept that the longer a patient remains in atrial fibrillation, the more challenging rhythm restoration and maintenance of sinus rhythm may become. We discussed that atrial fibrillation has an increased risk of CVA.  - She remains in sinus rhythm with first degree AV block on ECG today, and per her [employer] dread she has not recorded any subsequent events of atrial fibrillation since starting amiodarone therapy. She has severe left atrial enlargement on echocardiogram with a repeat echocardiogram ordered to continue to assess.    - She is not a candidate for class Ic antiarrhythmic agents given her history of carotid artery stenosis, GIIIDD, and baseline RBBB with QRSd of 136ms. She is not a candidate for class III antiarrhythmics in the setting of CKD stage III. Her previously prescribed flecainide was safely discontinued, and she remains on amiodarone 200mg po daily with excellent control of her atrial fibrillation.   - She is presently not maintained on rate control, with consideration for beta-blocker initiation in the event she has an increased burden of atrial fibrillation in the future or poor rate control while in AF.   - Her  "[id number] is 4 (HTN, carotid artery disease, female gender, age 65-74), portending an annual adjusted risk of CVA of 4%. She remains on uninterrupted apixaban therapy with no bleeding events reported.   - We discussed the possibility of catheter ablation with pulmonary vein isolation should she continue to have symptoms and AF despite AAD therapy. She voices understanding, although she would like to continue rhythm control with medication at this time. We discussed that with a history of persistent atrial fibrillation, the success rates with radiofrequency catheter ablation are reduced.     - Possible underlying drivers of atrial fibrillation were addressed at this appointment, including recommendations for maintenance of a healthy BMI - now a class I recommendation. Review of laboratory data revealed elevated TSH at 4.76, with no FT4 on record. A repeat TSH and FT4 have been ordered for laboratory draw today. A request for sleep study was previously placed today to evaluate for possible underlying obstructive sleep apnea.     Update (06/20/2024):    Today she says she has rare AF episodes lasting ~ 1 day but these are not frequent. "Few and far between." About 3 times since January. LH when in AF but otherwise denies LH, presyncope, syncope. No CP, GREY but does notice weight gain despite not changing her eating habits.    She is currently taking eliquis 5mg BID for stroke prophylaxis and denies significant bleeding episodes. She is currently being treated with amiodarone 200mg daily for rhythm control.  Kidney function is stable, with a creatinine of 1.17 on 6/29/2024. LFTs and TSH ok 10/2023. Needs PFTs.    I have personally reviewed the patient's EKG today, which shows SR with 1st deg AVB and RBBB at 73bpm. SD interval is 310. QRS is 140. QTc is 502.    Relevant Cardiac Test Results:    2D Echo (11/1/2023):    Left Ventricle: The left ventricle is normal in size. There is mild concentric hypertrophy. Normal " wall motion. There is normal systolic function with a visually estimated ejection fraction of 60 - 65%.    Right Ventricle: Normal right ventricular cavity size. Wall thickness is normal. Right ventricle wall motion  is normal. Systolic function is normal.    Left Atrium: Left atrium is moderately dilated.    Mitral Valve: Mildly thickened leaflets. There is mild to moderate stenosis. The mean pressure gradient across the mitral valve is 5 mmHg at a heart rate of 65 bpm. There is mild to moderate regurgitation with a posterolateral eccentriccally directed jet.    IVC/SVC: Normal venous pressure at 3 mmHg.    Current Outpatient Medications   Medication Sig    amiodarone (PACERONE) 200 MG Tab Take 1 tablet (200 mg total) by mouth once daily.    amLODIPine (NORVASC) 10 MG tablet TAKE ONE TABLET BY MOUTH EVERY EVENING    amLODIPine (NORVASC) 10 MG tablet Take 1 tablet (10 mg total) by mouth nightly.    ELIQUIS 5 mg Tab Take 1 tablet (5 mg total) by mouth 2 (two) times daily.    esomeprazole (NEXIUM) 40 MG capsule Take 40 mg by mouth once daily.     eszopiclone (LUNESTA) 3 mg Tab Take 1 tablet (3 mg total) by mouth every evening.    irbesartan (AVAPRO) 150 MG tablet TAKE ONE TABLET BY MOUTH EVERY EVENING    irbesartan (AVAPRO) 150 MG tablet Take 1 tablet (150 mg total) by mouth every evening.    irbesartan (AVAPRO) 150 MG tablet TAKE ONE TABLET BY MOUTH EVERY EVENING    levothyroxine (SYNTHROID) 75 MCG tablet Take 1 tablet (75 mcg total) by mouth before breakfast.    rosuvastatin (CRESTOR) 20 MG tablet TAKE ONE TABLET BY MOUTH EVERY EVENING    rosuvastatin (CRESTOR) 20 MG tablet Take 1 tablet (20 mg total) by mouth every evening.     No current facility-administered medications for this visit.       Review of Systems   Constitutional: Positive for weight gain. Negative for malaise/fatigue.   Cardiovascular:  Positive for palpitations. Negative for chest pain, dyspnea on exertion, irregular heartbeat and leg swelling.  "  Respiratory:  Negative for shortness of breath.    Hematologic/Lymphatic: Negative for bleeding problem.   Skin:  Negative for rash.   Musculoskeletal:  Negative for myalgias.   Gastrointestinal:  Negative for hematemesis, hematochezia and nausea.   Genitourinary:  Negative for hematuria.   Neurological:  Negative for light-headedness.   Psychiatric/Behavioral:  Negative for altered mental status.    Allergic/Immunologic: Negative for persistent infections.       Objective:          BP (!) 168/76   Pulse 74   Ht 5' 9" (1.753 m)   Wt 87 kg (191 lb 12.8 oz)   BMI 28.32 kg/m²     Physical Exam  Vitals and nursing note reviewed.   Constitutional:       Appearance: Normal appearance. She is well-developed.   HENT:      Head: Normocephalic.      Nose: Nose normal.   Eyes:      Pupils: Pupils are equal, round, and reactive to light.   Cardiovascular:      Rate and Rhythm: Normal rate and regular rhythm.   Pulmonary:      Effort: No respiratory distress.      Breath sounds: Normal breath sounds.   Musculoskeletal:         General: Normal range of motion.   Skin:     General: Skin is warm and dry.      Findings: No erythema.   Neurological:      Mental Status: She is alert and oriented to person, place, and time.   Psychiatric:         Speech: Speech normal.         Behavior: Behavior normal.           Lab Results   Component Value Date     06/29/2023    K 5.2 (H) 06/29/2023    MG 1.5 (L) 06/21/2022    BUN 29 (H) 06/29/2023    BUN 22.0 (H) 07/13/2022    CREATININE 1.17 06/29/2023    ALT 28 10/30/2023    AST 33 10/30/2023    HGB 14.9 06/29/2023    HCT 45.2 06/29/2023    TSH 6.080 (H) 10/30/2023    LDLCALC 108 06/21/2022       Recent Labs   Lab 11/22/22  0554 01/17/23  0555 06/29/23  1102   INR 1.1 1.1 1.1       Assessment:     1. Paroxysmal atrial fibrillation    2. Left ventricular hypertrophy    3. Primary hypertension    4. RBBB    5. Long term current use of anticoagulant    6. On amiodarone therapy      Plan: "     In summary, Ms. Mayorga is a 73 y.o. female with persistent atrial fibrillation, RBBB, HTN, HLD, bilateral carotid artery stenosis, moderate mitral regurgitation, mod tricuspid regurgitation, diastolic dysfunction, CKDIII, osteoporosis, tongue CA with radiation induced esophageal stricture, hypothyroid here for follow up.   Overall continues to do well from a rhythm standpoint with minimal AF breakthroughs on amiodarone. Will update amio testing. She says her palpitations are usually during sleep - will order sleep evaluation. PA interval increased - she denies presyncope or syncope. Will update monitor and check ECG in 3 mo to observe trend. If she can no longer tolerate amio or AF episodes increase, will likely need PVI. She is on eliquis for CVA prophylaxis.    TSH, LFT, PFT  Monitor  Sleep referral  ECG 3 mo  Continue current meds  RTC 6 mo if testing WNL, sooner if needed    *A copy of this note has been sent to Dr. Reagan*    Follow up in about 6 months (around 12/20/2024).    ------------------------------------------------------------------    MONROE Bush, NP-C  Cardiac Electrophysiology

## 2024-06-20 ENCOUNTER — OFFICE VISIT (OUTPATIENT)
Dept: ELECTROPHYSIOLOGY | Facility: CLINIC | Age: 74
End: 2024-06-20
Payer: MEDICARE

## 2024-06-20 ENCOUNTER — HOSPITAL ENCOUNTER (OUTPATIENT)
Dept: CARDIOLOGY | Facility: CLINIC | Age: 74
Discharge: HOME | End: 2024-06-20
Payer: MEDICARE

## 2024-06-20 VITALS
WEIGHT: 191.81 LBS | HEART RATE: 74 BPM | BODY MASS INDEX: 28.41 KG/M2 | HEIGHT: 69 IN | SYSTOLIC BLOOD PRESSURE: 168 MMHG | DIASTOLIC BLOOD PRESSURE: 76 MMHG

## 2024-06-20 DIAGNOSIS — I48.19 PERSISTENT ATRIAL FIBRILLATION: ICD-10-CM

## 2024-06-20 DIAGNOSIS — Z79.01 LONG TERM CURRENT USE OF ANTICOAGULANT: ICD-10-CM

## 2024-06-20 DIAGNOSIS — I45.10 RBBB: ICD-10-CM

## 2024-06-20 DIAGNOSIS — Z79.899 ON AMIODARONE THERAPY: ICD-10-CM

## 2024-06-20 DIAGNOSIS — I10 PRIMARY HYPERTENSION: ICD-10-CM

## 2024-06-20 DIAGNOSIS — I51.7 LEFT VENTRICULAR HYPERTROPHY: ICD-10-CM

## 2024-06-20 DIAGNOSIS — I48.0 PAROXYSMAL ATRIAL FIBRILLATION: Primary | ICD-10-CM

## 2024-06-20 LAB
OHS QRS DURATION: 140 MS
OHS QTC CALCULATION: 502 MS

## 2024-06-20 PROCEDURE — 99999 PR PBB SHADOW E&M-EST. PATIENT-LVL III: CPT | Mod: PBBFAC,,, | Performed by: NURSE PRACTITIONER

## 2024-06-20 PROCEDURE — 1160F RVW MEDS BY RX/DR IN RCRD: CPT | Mod: CPTII,S$GLB,, | Performed by: NURSE PRACTITIONER

## 2024-06-20 PROCEDURE — 3288F FALL RISK ASSESSMENT DOCD: CPT | Mod: CPTII,S$GLB,, | Performed by: NURSE PRACTITIONER

## 2024-06-20 PROCEDURE — 1101F PT FALLS ASSESS-DOCD LE1/YR: CPT | Mod: CPTII,S$GLB,, | Performed by: NURSE PRACTITIONER

## 2024-06-20 PROCEDURE — 1159F MED LIST DOCD IN RCRD: CPT | Mod: CPTII,S$GLB,, | Performed by: NURSE PRACTITIONER

## 2024-06-20 PROCEDURE — 3077F SYST BP >= 140 MM HG: CPT | Mod: CPTII,S$GLB,, | Performed by: NURSE PRACTITIONER

## 2024-06-20 PROCEDURE — 3008F BODY MASS INDEX DOCD: CPT | Mod: CPTII,S$GLB,, | Performed by: NURSE PRACTITIONER

## 2024-06-20 PROCEDURE — 93010 ELECTROCARDIOGRAM REPORT: CPT | Mod: S$GLB,,, | Performed by: INTERNAL MEDICINE

## 2024-06-20 PROCEDURE — 3078F DIAST BP <80 MM HG: CPT | Mod: CPTII,S$GLB,, | Performed by: NURSE PRACTITIONER

## 2024-06-20 PROCEDURE — 4010F ACE/ARB THERAPY RXD/TAKEN: CPT | Mod: CPTII,S$GLB,, | Performed by: NURSE PRACTITIONER

## 2024-06-20 PROCEDURE — 99214 OFFICE O/P EST MOD 30 MIN: CPT | Mod: S$GLB,,, | Performed by: NURSE PRACTITIONER

## 2024-06-20 PROCEDURE — 93005 ELECTROCARDIOGRAM TRACING: CPT | Mod: S$GLB,,, | Performed by: STUDENT IN AN ORGANIZED HEALTH CARE EDUCATION/TRAINING PROGRAM

## 2024-06-20 PROCEDURE — 1126F AMNT PAIN NOTED NONE PRSNT: CPT | Mod: CPTII,S$GLB,, | Performed by: NURSE PRACTITIONER

## 2024-06-21 ENCOUNTER — CLINICAL SUPPORT (OUTPATIENT)
Dept: CARDIOLOGY | Facility: HOSPITAL | Age: 74
End: 2024-06-21
Attending: NURSE PRACTITIONER
Payer: MEDICARE

## 2024-06-21 ENCOUNTER — DOCUMENTATION ONLY (OUTPATIENT)
Dept: CARDIOLOGY | Facility: HOSPITAL | Age: 74
End: 2024-06-21
Payer: MEDICARE

## 2024-06-21 DIAGNOSIS — I48.0 PAROXYSMAL ATRIAL FIBRILLATION: ICD-10-CM

## 2024-06-21 NOTE — PROGRESS NOTES
IRhythm monitoring was contacted for insurance verification on the above patient  3 day monitor. I had the pleasure of speaking with Mary on today.The estimated out of pocket cost of $45 was informed to be the patient portion of responsibility for service stated by [email or web address].Information submitted to be mailed out.  Call reference number is as followed:([id number])

## 2024-06-25 DIAGNOSIS — G47.01 INSOMNIA DUE TO MEDICAL CONDITION: ICD-10-CM

## 2024-06-25 NOTE — TELEPHONE ENCOUNTER
No care due was identified.  Health [facility] Embedded Care Due Messages. Reference number: [id number].   6/25/2024 10:04:59 AM CDT

## 2024-06-27 RX ORDER — ESZOPICLONE 3 MG/1
3 TABLET, FILM COATED ORAL NIGHTLY
Qty: 90 TABLET | Refills: 1 | Status: SHIPPED | OUTPATIENT
Start: 2024-06-27

## 2024-08-28 DIAGNOSIS — N18.30 CKD (CHRONIC KIDNEY DISEASE), STAGE III: ICD-10-CM

## 2024-09-16 ENCOUNTER — PATIENT OUTREACH (OUTPATIENT)
Dept: ADMINISTRATIVE | Facility: HOSPITAL | Age: 74
End: 2024-09-16
Payer: MEDICARE

## 2024-09-16 ENCOUNTER — OFFICE VISIT (OUTPATIENT)
Dept: PRIMARY CARE CLINIC | Facility: CLINIC | Age: 74
End: 2024-09-16
Payer: MEDICARE

## 2024-09-16 VITALS
OXYGEN SATURATION: 94 % | BODY MASS INDEX: 29.66 KG/M2 | SYSTOLIC BLOOD PRESSURE: 148 MMHG | DIASTOLIC BLOOD PRESSURE: 81 MMHG | HEART RATE: 82 BPM | WEIGHT: 200.81 LBS

## 2024-09-16 DIAGNOSIS — C02.9 MALIGNANT NEOPLASM OF TONGUE: ICD-10-CM

## 2024-09-16 DIAGNOSIS — I10 PRIMARY HYPERTENSION: Primary | ICD-10-CM

## 2024-09-16 DIAGNOSIS — F32.A DEPRESSION, UNSPECIFIED DEPRESSION TYPE: ICD-10-CM

## 2024-09-16 DIAGNOSIS — N18.31 STAGE 3A CHRONIC KIDNEY DISEASE: ICD-10-CM

## 2024-09-16 DIAGNOSIS — N18.32 STAGE 3B CHRONIC KIDNEY DISEASE: ICD-10-CM

## 2024-09-16 PROCEDURE — 4010F ACE/ARB THERAPY RXD/TAKEN: CPT | Mod: CPTII,S$GLB,, | Performed by: STUDENT IN AN ORGANIZED HEALTH CARE EDUCATION/TRAINING PROGRAM

## 2024-09-16 PROCEDURE — 99214 OFFICE O/P EST MOD 30 MIN: CPT | Mod: S$GLB,,, | Performed by: STUDENT IN AN ORGANIZED HEALTH CARE EDUCATION/TRAINING PROGRAM

## 2024-09-16 PROCEDURE — 1160F RVW MEDS BY RX/DR IN RCRD: CPT | Mod: CPTII,S$GLB,, | Performed by: STUDENT IN AN ORGANIZED HEALTH CARE EDUCATION/TRAINING PROGRAM

## 2024-09-16 PROCEDURE — 99999 PR PBB SHADOW E&M-EST. PATIENT-LVL IV: CPT | Mod: PBBFAC,,, | Performed by: STUDENT IN AN ORGANIZED HEALTH CARE EDUCATION/TRAINING PROGRAM

## 2024-09-16 PROCEDURE — 1159F MED LIST DOCD IN RCRD: CPT | Mod: CPTII,S$GLB,, | Performed by: STUDENT IN AN ORGANIZED HEALTH CARE EDUCATION/TRAINING PROGRAM

## 2024-09-16 PROCEDURE — 3077F SYST BP >= 140 MM HG: CPT | Mod: CPTII,S$GLB,, | Performed by: STUDENT IN AN ORGANIZED HEALTH CARE EDUCATION/TRAINING PROGRAM

## 2024-09-16 PROCEDURE — 1126F AMNT PAIN NOTED NONE PRSNT: CPT | Mod: CPTII,S$GLB,, | Performed by: STUDENT IN AN ORGANIZED HEALTH CARE EDUCATION/TRAINING PROGRAM

## 2024-09-16 PROCEDURE — 3008F BODY MASS INDEX DOCD: CPT | Mod: CPTII,S$GLB,, | Performed by: STUDENT IN AN ORGANIZED HEALTH CARE EDUCATION/TRAINING PROGRAM

## 2024-09-16 PROCEDURE — 3079F DIAST BP 80-89 MM HG: CPT | Mod: CPTII,S$GLB,, | Performed by: STUDENT IN AN ORGANIZED HEALTH CARE EDUCATION/TRAINING PROGRAM

## 2024-09-16 RX ORDER — SERTRALINE HYDROCHLORIDE 50 MG/1
50 TABLET, FILM COATED ORAL DAILY
Qty: 30 TABLET | Refills: 11 | Status: SHIPPED | OUTPATIENT
Start: 2024-09-16 | End: 2025-09-16

## 2024-09-16 NOTE — LETTER
AUTHORIZATION FOR RELEASE OF   CONFIDENTIAL INFORMATION    Dear Percy ESPINOZA,    We are seeing Juliana Andersen, date of birth 1950, in the clinic at [facility]. Gale Tapia MD is the patient's PCP. Juliana Andersen has an outstanding lab/procedure at the time we reviewed her chart. In order to help keep her health information updated, she has authorized us to request the following medical record(s):        (  )  MAMMOGRAM                                      (X)  COLONOSCOPY      (  )  PAP SMEAR                                          (  )  OUTSIDE LAB RESULTS     (  )  DEXA SCAN                                          (  )  EYE EXAM            (  )  FOOT EXAM                                          (  )  ENTIRE RECORD     (  )  OUTSIDE IMMUNIZATIONS                 (  )  _______________         Please fax records to [facility], Harvey, Brittany W., MD, [phone].     If you have any questions, please contact Lian at [phone].           Patient Name: Juliana Andersen  : 1950  Patient Phone #: [phone]

## 2024-09-16 NOTE — PROGRESS NOTES
Health Maintenance Due   Topic Date Due    Hepatitis C Screening  Never done    Annual UACr  Never done    TETANUS VACCINE  Never done    Mammogram  Never done    DEXA Scan  Never done    Colorectal Cancer Screening  Never done    RSV Vaccine (Age 60+ and Pregnant patients) (1 - 1-dose 60+ series) Never done    Influenza Vaccine (1) 09/01/2024    COVID-19 Vaccine (8 - 2023-24 season) 09/01/2024     Chart review completed. HM Updated. Triggered Links. Immunizations reviewed and updated. Care Everywhere Updated. Care Team Updated.  Requested colonoscopy records from [facility].

## 2024-09-16 NOTE — PROGRESS NOTES
SUBJECTIVE     Chief Complaint   Patient presents with    Follow-up       HPI  Juliana Esperanza Andersen is a very pleasant 73 y.o. female with HTN, paroxysmal Afib, subclinical hypothyroidism, CKD3, osteoporosis, and malignant neoplasm of tongue (in remission) that presents for annual exam.     Pt is UTD on age appropriate CA screening. Colonoscopy done at [facility].    Family, social, surgical Hx reviewed     Subclinical hypothyroidism: Synthroid 75 mcg daily    Osteoporosis: Ca and vit d daily    Paroxysmal Afib/ HTN:  Eliquis 5mg bid and Amiodarone 200mg daily  Amlodipine 10 mg irbesartan 150 mg daily    Depression:  Following loss of her  several months ago.  +decreased pleasure in doing things, difficulty with sleep, decreased energy, eating changes, and difficulty concentrating.  Has not been on medication in the past      Health Maintenance         Date Due Completion Date    Hepatitis C Screening Never done ---    Annual UACr Never done ---    TETANUS VACCINE Never done ---    Mammogram Never done ---    DEXA Scan Never done ---    Colorectal Cancer Screening Never done ---    RSV Vaccine (Age 60+ and Pregnant patients) (1 - 1-dose 60+ series) Never done ---    Influenza Vaccine (1) 2024    COVID-19 Vaccine (2023- season) 2024    High Dose Statin 2025    Lipid Panel 2027              PAST MEDICAL HISTORY:  Past Medical History:   Diagnosis Date    Anticoagulant long-term use     Encounter for monitoring flecainide therapy 2022    Hypertension     Hypothyroidism, unspecified     Malignant neoplasm of tongue     Paroxysmal atrial fibrillation     Radiation-induced esophageal stricture        PAST SURGICAL HISTORY:  Past Surgical History:   Procedure Laterality Date     egd   once per month at [facility]      ADENOIDECTOMY  1968    BREAST LUMPECTOMY Right 2013    CARDIOVERSION       SECTION  1986,1987    FRACTURE SURGERY       TONSILLECTOMY  1968    TUBAL LIGATION         SOCIAL HISTORY:  Social History     Socioeconomic History    Marital status:    Tobacco Use    Smoking status: Former     Current packs/day: 0.00     Types: Cigarettes     Quit date: 2005     Years since quittin.7    Smokeless tobacco: Never   Substance and Sexual Activity    Alcohol use: Yes     Alcohol/week: 3.0 standard drinks of alcohol     Types: 3 Cans of beer per week     Social Determinants of Health     Financial Resource Strain: Low Risk  (2024)    Overall Financial Resource Strain (CARDIA)     Difficulty of Paying Living Expenses: Not very hard   Food Insecurity: No Food Insecurity (2024)    Hunger Vital Sign     Worried About Running Out of Food in the Last Year: Never true     Ran Out of Food in the Last Year: Never true   Physical Activity: Unknown (2024)    Exercise Vital Sign     Days of Exercise per Week: 2 days   Stress: Stress Concern Present (2024)    [place] [employer] - Occupational Stress Questionnaire     Feeling of Stress : Very much   Housing Stability: Unknown (2024)    Housing Stability Vital Sign     Unable to Pay for Housing in the Last Year: No       FAMILY HISTORY:  Family History   Problem Relation Name Age of Onset    Alzheimer's disease Mother Claudean Britt     Heart disease Mother Claudean Britt     Cancer Father Brain Mata     Cancer Sister Mikaela Navas        ALLERGIES AND MEDICATIONS: updated and reviewed.  Review of patient's allergies indicates:   Allergen Reactions    Cefazolin      Other reaction(s): Extreme pain left leg and right arm  Other reaction(s): Extreme pain left leg and right arm     Current Outpatient Medications   Medication Sig Dispense Refill    amiodarone (PACERONE) 200 MG Tab Take 1 tablet (200 mg total) by mouth once daily. 30 tablet 2    amLODIPine (NORVASC) 10 MG tablet Take 1 tablet (10 mg total) by mouth nightly. 90 tablet 3    ELIQUIS  5 mg Tab Take 1 tablet (5 mg total) by mouth 2 (two) times daily. 180 tablet 4    eszopiclone (LUNESTA) 3 mg Tab Take 1 tablet (3 mg total) by mouth every evening. 90 tablet 1    irbesartan (AVAPRO) 150 MG tablet Take 1 tablet (150 mg total) by mouth every evening. 90 tablet 3    levothyroxine (SYNTHROID) 75 MCG tablet Take 1 tablet (75 mcg total) by mouth before breakfast. 90 tablet 3    rosuvastatin (CRESTOR) 20 MG tablet Take 1 tablet (20 mg total) by mouth every evening. 90 tablet 3    sertraline (ZOLOFT) 50 MG tablet Take 1 tablet (50 mg total) by mouth once daily. 30 tablet 11     No current facility-administered medications for this visit.       ROS  Review of Systems   Constitutional:  Negative for fever and weight loss.   HENT:  Negative for hearing loss.    Eyes:  Negative for discharge.   Respiratory:  Negative for cough, shortness of breath and wheezing.    Cardiovascular:  Negative for chest pain and palpitations.   Gastrointestinal:  Negative for abdominal pain, blood in stool, constipation, diarrhea, nausea and vomiting.   Genitourinary:  Negative for dysuria and hematuria.   Musculoskeletal:  Negative for back pain, joint pain and neck pain.   Skin:  Negative for rash.   Neurological:  Negative for dizziness, weakness and headaches.   Endo/Heme/Allergies:  Negative for polydipsia.   Psychiatric/Behavioral:  Positive for depression. The patient is not nervous/anxious.          OBJECTIVE     Physical Exam  Vitals:    09/16/24 1407   BP: (!) 148/81   Pulse:     Body mass index is 29.66 kg/m².  Weight: 91.1 kg (200 lb 13.4 oz)         Physical Exam  HENT:      Head: Normocephalic and atraumatic.      Nose: Nose normal.      Mouth/Throat:      Mouth: Mucous membranes are moist.      Pharynx: Oropharynx is clear.   Eyes:      Extraocular Movements: Extraocular movements intact.      Conjunctiva/sclera: Conjunctivae normal.      Pupils: Pupils are equal, round, and reactive to light.   Cardiovascular:       Rate and Rhythm: Normal rate and regular rhythm.   Pulmonary:      Effort: Pulmonary effort is normal.      Breath sounds: Normal breath sounds.   Musculoskeletal:         General: No swelling. Normal range of motion.      Cervical back: Normal range of motion.      Right lower leg: No edema.      Left lower leg: No edema.   Skin:     General: Skin is warm.      Findings: No lesion or rash.   Neurological:      General: No focal deficit present.      Mental Status: She is alert and oriented to person, place, and time.      Motor: No weakness.   Psychiatric:         Mood and Affect: Mood normal.         Thought Content: Thought content normal.               ASSESSMENT     73 y.o. female with     1. Primary hypertension    2. Stage 3b chronic kidney disease    3. Depression, unspecified depression type    4. Malignant neoplasm of tongue    5. Stage 3a chronic kidney disease          PLAN:     1. Primary hypertension  -     BASIC METABOLIC PANEL; Future; Expected date: 09/16/2024  -     Microalbumin/Creatinine Ratio, Urine; Future; Expected date: 09/16/2024  Stable on medications, continue regimen    2. Stage 3b chronic kidney disease  -     BASIC METABOLIC PANEL; Future; Expected date: 09/16/2024  Repeat in 4-6 weeks    3. Depression, unspecified depression type  -     sertraline (ZOLOFT) 50 MG tablet; Take 1 tablet (50 mg total) by mouth once daily.  Dispense: 30 tablet; Refill: 11  Start Zoloft 25 mg daily today.  Increase to 50 mg in 2 weeks  Follow up in 6 weeks for dose titration.  Explained risks of this class of medication including potential for SI/HI/AVH. Educated to stop medication and proceed to ED if pt experiences SI/HI/AVH. Patient verbalizes understanding of the plan.    4. Malignant neoplasm of tongue  Overview:  2006  Chemo and radiation, in remission      5. Stage 3a chronic kidney disease  Follow-up BMP          Discussed age and gender appropriate screenings at this visit and encouraged a healthy  diet low in simple carbohydrates, and increased physical activity.  Counseled on medically appropriate vaccines based on age and current health condition.  Screening test reviewed and discussed with patient.      RTC in 6 months    Gale Tapia MD

## 2024-09-25 ENCOUNTER — PATIENT MESSAGE (OUTPATIENT)
Dept: PRIMARY CARE CLINIC | Facility: CLINIC | Age: 74
End: 2024-09-25
Payer: MEDICARE

## 2024-09-26 VITALS — DIASTOLIC BLOOD PRESSURE: 77 MMHG | SYSTOLIC BLOOD PRESSURE: 142 MMHG

## 2024-09-27 ENCOUNTER — TELEPHONE (OUTPATIENT)
Dept: ADMINISTRATIVE | Facility: HOSPITAL | Age: 74
End: 2024-09-27
Payer: MEDICARE

## 2024-10-01 ENCOUNTER — PATIENT OUTREACH (OUTPATIENT)
Dept: ADMINISTRATIVE | Facility: HOSPITAL | Age: 74
End: 2024-10-01
Payer: MEDICARE

## 2024-10-01 NOTE — PROGRESS NOTES
Health Maintenance Due   Topic Date Due    Hepatitis C Screening  Never done    Annual UACr  Never done    TETANUS VACCINE  Never done    Mammogram  Never done    DEXA Scan  Never done    Colorectal Cancer Screening  Never done    RSV Vaccine (Age 60+ and Pregnant patients) (1 - Risk 60-74 years 1-dose series) Never done    Influenza Vaccine (1) 09/01/2024    COVID-19 Vaccine (8 - 2024-25 season) 09/01/2024     Chart review completed. HM Updated. Triggered Links. Immunizations reviewed and updated. Care Everywhere Updated. Care Team Updated.  Imported outside EGD results from [facility] into /media.

## 2024-11-09 ENCOUNTER — PATIENT MESSAGE (OUTPATIENT)
Dept: ADMINISTRATIVE | Facility: HOSPITAL | Age: 74
End: 2024-11-09
Payer: MEDICARE

## 2024-11-10 DIAGNOSIS — Z12.11 SCREENING FOR COLON CANCER: ICD-10-CM

## 2024-11-15 RX ORDER — AMIODARONE HYDROCHLORIDE 200 MG/1
200 TABLET ORAL DAILY
Qty: 30 TABLET | Refills: 11 | Status: SHIPPED | OUTPATIENT
Start: 2024-11-15

## 2024-11-20 DIAGNOSIS — I10 PRIMARY HYPERTENSION: ICD-10-CM

## 2024-11-20 RX ORDER — AMLODIPINE BESYLATE 10 MG/1
10 TABLET ORAL NIGHTLY
Qty: 90 TABLET | Refills: 1 | Status: SHIPPED | OUTPATIENT
Start: 2024-11-20

## 2024-12-14 DIAGNOSIS — G47.01 INSOMNIA DUE TO MEDICAL CONDITION: ICD-10-CM

## 2024-12-14 NOTE — TELEPHONE ENCOUNTER
No care due was identified.  Health [facility] Embedded Care Due Messages. Reference number: [id number].   12/14/2024 12:01:14 PM CST

## 2024-12-16 RX ORDER — ESZOPICLONE 3 MG/1
3 TABLET, FILM COATED ORAL NIGHTLY
Qty: 90 TABLET | Refills: 1 | Status: SHIPPED | OUTPATIENT
Start: 2024-12-16

## 2024-12-16 RX ORDER — ESZOPICLONE 3 MG/1
3 TABLET, FILM COATED ORAL NIGHTLY
Qty: 90 TABLET | Refills: 1 | OUTPATIENT
Start: 2024-12-16

## 2025-02-12 DIAGNOSIS — E03.8 SUBCLINICAL HYPOTHYROIDISM: ICD-10-CM

## 2025-02-12 DIAGNOSIS — I10 PRIMARY HYPERTENSION: ICD-10-CM

## 2025-02-12 RX ORDER — LEVOTHYROXINE SODIUM 75 UG/1
75 TABLET ORAL
Qty: 90 TABLET | Refills: 1 | Status: SHIPPED | OUTPATIENT
Start: 2025-02-12

## 2025-02-12 RX ORDER — IRBESARTAN 150 MG/1
150 TABLET ORAL NIGHTLY
Qty: 90 TABLET | Refills: 3 | Status: SHIPPED | OUTPATIENT
Start: 2025-02-12

## 2025-02-12 NOTE — TELEPHONE ENCOUNTER
Refill Decision Note   Juliana Andersen  is requesting a refill authorization.  Brief Assessment and Rationale for Refill:  Approve     Medication Therapy Plan:         Comments:     Note composed:3:33 PM 02/12/2025

## 2025-02-12 NOTE — TELEPHONE ENCOUNTER
No care due was identified.  [facility] Embedded Care Due Messages. Reference number: [id number].   2/12/2025 1:29:23 PM CST

## 2025-02-21 ENCOUNTER — OFFICE VISIT (OUTPATIENT)
Dept: FAMILY MEDICINE | Facility: CLINIC | Age: 75
End: 2025-02-21
Payer: MEDICARE

## 2025-02-21 VITALS
TEMPERATURE: 98 F | DIASTOLIC BLOOD PRESSURE: 64 MMHG | RESPIRATION RATE: 18 BRPM | BODY MASS INDEX: 30.36 KG/M2 | HEIGHT: 69 IN | HEART RATE: 76 BPM | SYSTOLIC BLOOD PRESSURE: 122 MMHG | OXYGEN SATURATION: 97 % | WEIGHT: 205 LBS

## 2025-02-21 DIAGNOSIS — F33.1 MODERATE EPISODE OF RECURRENT MAJOR DEPRESSIVE DISORDER: ICD-10-CM

## 2025-02-21 DIAGNOSIS — Z11.59 NEED FOR HEPATITIS C SCREENING TEST: ICD-10-CM

## 2025-02-21 DIAGNOSIS — F41.1 GAD (GENERALIZED ANXIETY DISORDER): ICD-10-CM

## 2025-02-21 DIAGNOSIS — Z78.0 ENCOUNTER FOR OSTEOPOROSIS SCREENING IN ASYMPTOMATIC POSTMENOPAUSAL PATIENT: ICD-10-CM

## 2025-02-21 DIAGNOSIS — E03.9 ACQUIRED HYPOTHYROIDISM: ICD-10-CM

## 2025-02-21 DIAGNOSIS — I10 ESSENTIAL HYPERTENSION: ICD-10-CM

## 2025-02-21 DIAGNOSIS — E78.49 OTHER HYPERLIPIDEMIA: ICD-10-CM

## 2025-02-21 DIAGNOSIS — I10 ESSENTIAL HYPERTENSION: Chronic | ICD-10-CM

## 2025-02-21 DIAGNOSIS — R79.9 ABNORMAL BLOOD CHEMISTRY: ICD-10-CM

## 2025-02-21 DIAGNOSIS — Z13.820 ENCOUNTER FOR OSTEOPOROSIS SCREENING IN ASYMPTOMATIC POSTMENOPAUSAL PATIENT: ICD-10-CM

## 2025-02-21 DIAGNOSIS — M25.551 PAIN OF RIGHT HIP: ICD-10-CM

## 2025-02-21 DIAGNOSIS — I48.0 PAROXYSMAL ATRIAL FIBRILLATION: ICD-10-CM

## 2025-02-21 DIAGNOSIS — E66.811 CLASS 1 OBESITY DUE TO EXCESS CALORIES WITH SERIOUS COMORBIDITY AND BODY MASS INDEX (BMI) OF 30.0 TO 30.9 IN ADULT: ICD-10-CM

## 2025-02-21 DIAGNOSIS — N18.32 STAGE 3B CHRONIC KIDNEY DISEASE: ICD-10-CM

## 2025-02-21 DIAGNOSIS — Z11.4 ENCOUNTER FOR SCREENING FOR HIV: ICD-10-CM

## 2025-02-21 DIAGNOSIS — Z12.31 BREAST CANCER SCREENING BY MAMMOGRAM: ICD-10-CM

## 2025-02-21 DIAGNOSIS — E66.09 CLASS 1 OBESITY DUE TO EXCESS CALORIES WITH SERIOUS COMORBIDITY AND BODY MASS INDEX (BMI) OF 30.0 TO 30.9 IN ADULT: ICD-10-CM

## 2025-02-21 DIAGNOSIS — Z00.00 ENCOUNTER FOR MEDICAL EXAMINATION TO ESTABLISH CARE: ICD-10-CM

## 2025-02-21 PROBLEM — C02.9 MALIGNANT NEOPLASM OF TONGUE: Status: RESOLVED | Noted: 2021-03-12 | Resolved: 2025-02-21

## 2025-02-21 RX ORDER — APIXABAN 5 MG/1
5 TABLET, FILM COATED ORAL 2 TIMES DAILY
Qty: 180 TABLET | Refills: 4 | Status: SHIPPED | OUTPATIENT
Start: 2025-02-21 | End: 2026-05-17

## 2025-02-21 NOTE — PROGRESS NOTES
[facility] Note    Chief Complaint      Chief Complaint   Patient presents with      Establish Care  Hip pain   Worsening Anxiety and depression        History of Present Illness      DANUTA Andrews is a 73yo F with sHTN, Afib, hypothyroidism,  HLD, chronic insomnia ,  and anxiety who presents for a new PCP visit and to discuss ongoing hip pain and worsening depression/anxiety. Juliana reports tendinitis in her right hip since October, describing deep pain in the groin area. An orthopedist initially evaluated her, performed X-rays, and diagnosed tendinitis. The orthopedist advised that recovery would take time, so she rested the affected hip. The pain had improved, but during a follow-up visit, the orthopedist's hip manipulation significantly worsened the pain, causing her to regress in her recovery.    The hip pain has limited her ability to exercise, leading to weight gain. At its worst, the pain severely restricted her walking. She underwent physical therapy as part of her treatment. She believes the pain may have been caused by getting in and out of a Jeep vehicle, which she found difficult after her 's passing. As a result, she switched to a smaller car. Her hip pain is significantly improved now.    Juliana has atrial fibrillation (AFib) and high blood pressure, for which she takes multiple medications. She avoids taking pain medication due to her AFib.    Juliana has a history of tongue cancer in 2006, for which she received intense chemotherapy and radiation. This treatment led to complications, including esophageal closure, necessitating a PEG tube from 2006 to 2018. She underwent periodic EGDs to gradually open her esophagus. Since 2018, her condition has improved, though she occasionally has difficulty swallowing, which she can usually clear by drinking water. She still cannot swallow pills and must crush all her medications.    Juliana reports feeling depressed following her  's death about a year ago. She feels overwhelmed, particularly with estate matters. She has difficulty sleeping and feels socially isolated since her 's passing.    Juliana denies any history of diabetes. She denies any current bleeding issues, but notes that when she does bleed, it tends to be significant.    MEDICATIONS:  - Amiodarone for atrial fibrillation  - Eliquis (apixaban) as a blood thinner  - Levothyroxine taken at 8 o'clock with applesauce, for thyroid condition  - Lunesta taken nightly for sleep  - Multiple medications for high blood pressure  - Discontinued Zoloft, prescribed for depression/anxiety    MEDICAL HISTORY:  - Atrial fibrillation  - Hypertension  - Chronic kidney disease: Stage 3B  - Tongue cancer: 2006  - Esophageal stricture: Following tongue cancer treatment, improved since 2018  - Depression  - Anxiety  - Hypothyroidism    SURGICAL HISTORY:  - Colonoscopy: 3 years ago, no findings  - PEG tube placement: From 2006 to 2018, due to esophageal stricture following cancer treatment  - Multiple EGDs: Every 6 months from an unspecified date until 2018, to gradually open esophageal stricture    TEST RESULTS:  - Kidney function: 2021-present, decreasing over time, most recent result at 33 (Stage 3B chronic kidney disease)    IMAGING:  - Hip X-rays: October (previous year), results consistent with tendinitis    SOCIAL HISTORY:  - Occupation: Retired    Marital status:  1 year ago    ROS:  General: -fever, -chills, -fatigue, -weight gain, -weight loss, -change in weight  Eyes: -vision changes, -redness, -discharge  ENT: -ear pain, -nasal congestion, -sore throat, +difficulty swallowing  Cardiovascular: -chest pain, -palpitations, -lower extremity edema  Respiratory: -cough, -shortness of breath  Gastrointestinal: -abdominal pain, -nausea, -vomiting, -diarrhea, -constipation, -blood in stool  Genitourinary: -dysuria, -hematuria, -frequency  Musculoskeletal: +joint pain, -muscle  pain  Skin: -rash, -lesion  Neurological: -headache, -dizziness, -numbness, -tingling  Psychiatric: -anxiety, +depression, +sleep difficulty  Lymphatics: -easy bruising          Juliana Andersen is a 74 y.o. female who presents with:    Problem List Addressed This Visit:    1. Encounter for medical examination to establish care    2. Moderate episode of recurrent major depressive disorder    3. MESHA (generalized anxiety disorder)    4. Stage 3b chronic kidney disease  -     Vitamin D; Future; Expected date: 02/21/2025  -     CBC Auto Differential; Future; Expected date: 02/21/2025  -     Microalbumin/Creatinine Ratio, Urine; Standing  -     PTH, Intact; Future; Expected date: 02/21/2025  -      Renal Artery Stenosis Hyperten (xpd); Future; Expected date: 02/21/2025    5. Essential hypertension  -     TSH; Standing  -     Comprehensive Metabolic Panel; Standing    6. Paroxysmal atrial fibrillation    7. Other hyperlipidemia  -     Lipid Panel; Standing    8. Acquired hypothyroidism  -     T4, Free; Standing    9. Pain of right hip    10. Class 1 obesity due to excess calories with serious comorbidity and body mass index (BMI) of 30.0 to 30.9 in adult    11. Encounter for osteoporosis screening in asymptomatic postmenopausal patient  -     DXA Bone Density Axial Skeleton 1 or more sites; Future; Expected date: 02/21/2025    12. Breast cancer screening by mammogram  -     Mammo Digital Screening Bilat w/ José Miguel (XPD); Standing    13. Need for hepatitis C screening test  -     Hepatitis C Antibody; Future; Expected date: 02/21/2025    14. Encounter for screening for HIV  -     HIV 1/2 Ag/Ab (4th Gen); Future; Expected date: 02/21/2025    15. Abnormal blood chemistry  -     Hemoglobin A1C; Standing           Encounter Medications[1]     Review of patient's allergies indicates:   Allergen Reactions    Cefazolin      Other reaction(s): Extreme pain left leg and right arm  Other reaction(s): Extreme pain left leg and  "right arm       Physical Exam      Vital Signs  Temp: 97.7 °F (36.5 °C)  Temp Source: Temporal  Pulse: 76  Resp: 18  SpO2: 97 %  BP: 122/64  BP Location: Right arm  Patient Position: Sitting  Pain Score: 0-No pain  Height and Weight  Height: 5' 9" (175.3 cm)  Weight: 93 kg (205 lb 0.4 oz)  BSA (Calculated - sq m): 2.13 sq meters  BMI (Calculated): 30.3  Weight in (lb) to have BMI = 25: 168.9]    Physical Exam    General: No acute distress. Well-developed. Well-nourished.  Eyes: EOMI. Sclerae anicteric.  HENT: Normocephalic. Atraumatic. Nares patent. Moist oral mucosa.  Ears: Bilateral TMs clear. Bilateral EACs clear.  Cardiovascular: Regular rate. Regular rhythm. No murmurs. No rubs. No gallops. Normal S1, S2.  Respiratory: Normal respiratory effort. Clear to auscultation bilaterally. No rales. No rhonchi. No wheezing.  Abdomen: Soft. Non-tender. Non-distended. Normoactive bowel sounds.  Musculoskeletal: No  obvious deformity.  Extremities: No lower extremity edema.  Neurological: Alert & oriented x3. No slurred speech. Normal gait.  Psychiatric: Normal mood. Normal affect. Good insight. Good judgment.  Skin: Warm. Dry. No rash.          Laboratory:  CBC:  No results for input(s): "WBC", "RBC", "HGB", "HCT", "PLT", "MCV", "MCH", "MCHC" in the last 2160 hours.  CMP:  No results for input(s): "GLU", "CALCIUM", "ALBUMIN", "PROT", "NA", "K", "CO2", "CL", "BUN", "ALKPHOS", "ALT", "AST", "BILITOT" in the last 2160 hours.    Invalid input(s): "CREATININ"  URINALYSIS:  No results for input(s): "COLORU", "CLARITYU", "SPECGRAV", "PHUR", "PROTEINUA", "GLUCOSEU", "BILIRUBINCON", "BLOODU", "WBCU", "RBCU", "BACTERIA", "MUCUS", "NITRITE", "LEUKOCYTESUR", "UROBILINOGEN", "HYALINECASTS" in the last 2160 hours.   LIPIDS:  No results for input(s): "TSH", "HDL", "CHOL", "TRIG", "LDLCALC", "CHOLHDL", "NONHDLCHOL", "TOTALCHOLEST" in the last 2160 hours.  TSH:  No results for input(s): "TSH" in the last 2160 hours.  A1C:  No results for " "input(s): "HGBA1C" in the last 2160 hours.    Radiology:      Assessment/Plan     Juliana Andersen is a 74 y.o.female with:    1. Encounter for medical examination to establish care    2. Moderate episode of recurrent major depressive disorder    3. MESHA (generalized anxiety disorder)    4. Stage 3b chronic kidney disease  - Vitamin D; Future  - CBC Auto Differential; Future  - Microalbumin/Creatinine Ratio, Urine; Standing  - PTH, Intact; Future  - US Renal Artery Stenosis Hyperten (xpd); Future    5. Essential hypertension  - TSH; Standing  - Comprehensive Metabolic Panel; Standing    6. Paroxysmal atrial fibrillation    7. Other hyperlipidemia  - Lipid Panel; Standing    8. Acquired hypothyroidism  - T4, Free; Standing    9. Pain of right hip    10. Class 1 obesity due to excess calories with serious comorbidity and body mass index (BMI) of 30.0 to 30.9 in adult    11. Encounter for osteoporosis screening in asymptomatic postmenopausal patient  - DXA Bone Density Axial Skeleton 1 or more sites; Future    12. Breast cancer screening by mammogram  - Mammo Digital Screening Bilat w/ José Miguel (XPD); Standing    13. Need for hepatitis C screening test  - Hepatitis C Antibody; Future    14. Encounter for screening for HIV  - HIV 1/2 Ag/Ab (4th Gen); Future    15. Abnormal blood chemistry  - Hemoglobin A1C; Standing    Assessment & Plan    -BP at goal , c/w current regimen : avapro/amlodipine  -lipid panel at goal, c/w crestor  -rate controlled, denies any bleeding, c/w DOAC  - Assessed kidney function, noting decline from stage 3A to 3B chronic kidney disease (CKD) based on lab trends since 2021.  - Considering management strategies for CKD, including medication adjustments and lifestyle modifications to delay progression  - Discussed importance of hydration for kidney health.  - Juliana to increase water intake to 8-10 cups daily, sipping throughout the day.  - Recommend focusing on consuming more greens and " vegetables, reduce protein intake.  - Discontinued Advil, Motrin, Naproxen, and Ibuprofen due to kidney function.  - Fasting labs ordered to reassess kidney function and additional labs.  - Ultrasound of kidneys ordered.  - Juliana to engage in low-impact exercises: 30-minute walks, elliptical, or stationary bicycle.  - Addressed weight gain related to reduced exercise capacity due to hip pain.  - Assessed depression related to spouse's death 1 year ago, recommending trial of antidepressant medication.  - Explained expected timeline for antidepressant efficacy (4-6 weeks) and potential benefits.  - Started Zoloft 1 tablet daily for depression.  - Continued Lunesta for sleep.  - Continued levothyroxine, to be taken on an empty stomach.    -Continue current medications and maintain follow up with specialists.      Patient verbalizes understanding and agrees with current treatment plan.    This note was generated with the assistance of ambient listening technology. I attest to having reviewed and edited the generated note for accuracy, though some syntax or spelling errors may persist. Please contact the author of this note for any clarification.     92 minutes of total time spent on the encounter, which includes face to face time and non-face to face time preparing to see the patient (eg, review of tests), Obtaining and/or reviewing separately obtained history, Documenting clinical information in the electronic or other health record, Independently interpreting results (not separately reported) and communicating results to the patient or Care coordination (not separately reported).      Ale Reid MD  Internal Medicine   [facility]                       [1]   Outpatient Encounter Medications as of 2/21/2025   Medication Sig Note Dispense Refill    amiodarone (PACERONE) 200 MG Tab Take 1 tablet (200 mg total) by mouth once daily.  30 tablet 11    amLODIPine (NORVASC) 10 MG tablet TAKE  ONE TABLET BY MOUTH EVERY EVENING  90 tablet 1    eszopiclone (LUNESTA) 3 mg Tab TAKE ONE TABLET BY MOUTH EVERY EVENING  90 tablet 1    irbesartan (AVAPRO) 150 MG tablet TAKE ONE TABLET BY MOUTH EVERY EVENING  90 tablet 3    levothyroxine (SYNTHROID) 75 MCG tablet TAKE ONE TABLET BY MOUTH EVERY DAY BEFORE BREAKFAST  90 tablet 1    rosuvastatin (CRESTOR) 20 MG tablet Take 1 tablet (20 mg total) by mouth every evening.  90 tablet 3    sertraline (ZOLOFT) 50 MG tablet Take 1 tablet (50 mg total) by mouth once daily. 2/21/2025: Has not taken due to concerns of triggering her Afib. 30 tablet 11    [DISCONTINUED] ELIQUIS 5 mg Tab Take 1 tablet (5 mg total) by mouth 2 (two) times daily.  180 tablet 4    [DISCONTINUED] irbesartan (AVAPRO) 150 MG tablet Take 1 tablet (150 mg total) by mouth every evening.  90 tablet 3    [DISCONTINUED] levothyroxine (SYNTHROID) 75 MCG tablet Take 1 tablet (75 mcg total) by mouth before breakfast.  90 tablet 3     No facility-administered encounter medications on file as of 2/21/2025.

## 2025-02-25 ENCOUNTER — RESULTS FOLLOW-UP (OUTPATIENT)
Dept: FAMILY MEDICINE | Facility: CLINIC | Age: 75
End: 2025-02-25

## 2025-02-27 ENCOUNTER — OFFICE VISIT (OUTPATIENT)
Dept: CARDIOLOGY | Facility: CLINIC | Age: 75
End: 2025-02-27
Payer: MEDICARE

## 2025-02-27 VITALS
HEART RATE: 76 BPM | BODY MASS INDEX: 30.93 KG/M2 | SYSTOLIC BLOOD PRESSURE: 138 MMHG | DIASTOLIC BLOOD PRESSURE: 73 MMHG | WEIGHT: 209.44 LBS

## 2025-02-27 DIAGNOSIS — I48.0 PAROXYSMAL ATRIAL FIBRILLATION: Primary | ICD-10-CM

## 2025-02-27 DIAGNOSIS — Z79.01 ANTICOAGULATION MANAGEMENT ENCOUNTER: ICD-10-CM

## 2025-02-27 DIAGNOSIS — E78.2 MIXED HYPERLIPIDEMIA: ICD-10-CM

## 2025-02-27 DIAGNOSIS — I65.23 CAROTID STENOSIS, ASYMPTOMATIC, BILATERAL: ICD-10-CM

## 2025-02-27 DIAGNOSIS — Z51.81 ANTICOAGULATION MANAGEMENT ENCOUNTER: ICD-10-CM

## 2025-02-27 DIAGNOSIS — I10 PRIMARY HYPERTENSION: ICD-10-CM

## 2025-02-27 DIAGNOSIS — I51.7 LEFT VENTRICULAR HYPERTROPHY: ICD-10-CM

## 2025-02-27 DIAGNOSIS — I34.1 MITRAL VALVE PROLAPSE: ICD-10-CM

## 2025-02-27 PROCEDURE — 4010F ACE/ARB THERAPY RXD/TAKEN: CPT | Mod: CPTII,S$GLB,, | Performed by: INTERNAL MEDICINE

## 2025-02-27 PROCEDURE — 99999 PR PBB SHADOW E&M-EST. PATIENT-LVL III: CPT | Mod: PBBFAC,,, | Performed by: INTERNAL MEDICINE

## 2025-02-27 PROCEDURE — 1126F AMNT PAIN NOTED NONE PRSNT: CPT | Mod: CPTII,S$GLB,, | Performed by: INTERNAL MEDICINE

## 2025-02-27 PROCEDURE — 3288F FALL RISK ASSESSMENT DOCD: CPT | Mod: CPTII,S$GLB,, | Performed by: INTERNAL MEDICINE

## 2025-02-27 PROCEDURE — 1159F MED LIST DOCD IN RCRD: CPT | Mod: CPTII,S$GLB,, | Performed by: INTERNAL MEDICINE

## 2025-02-27 PROCEDURE — 3075F SYST BP GE 130 - 139MM HG: CPT | Mod: CPTII,S$GLB,, | Performed by: INTERNAL MEDICINE

## 2025-02-27 PROCEDURE — 3078F DIAST BP <80 MM HG: CPT | Mod: CPTII,S$GLB,, | Performed by: INTERNAL MEDICINE

## 2025-02-27 PROCEDURE — 1101F PT FALLS ASSESS-DOCD LE1/YR: CPT | Mod: CPTII,S$GLB,, | Performed by: INTERNAL MEDICINE

## 2025-02-27 PROCEDURE — 99213 OFFICE O/P EST LOW 20 MIN: CPT | Mod: S$GLB,,, | Performed by: INTERNAL MEDICINE

## 2025-02-27 PROCEDURE — 3008F BODY MASS INDEX DOCD: CPT | Mod: CPTII,S$GLB,, | Performed by: INTERNAL MEDICINE

## 2025-02-27 NOTE — PROGRESS NOTES
Subjective:   02/27/2025     Patient ID:  Juliana Andersen is a 74 y.o. female who presents for evaulation of Follow-up (12 month follow up)      Comes in for follow-up, does have hypertension, generally at home it is fairly well controlled on therapy with amlodipine and irbesartan.      She has not had chest pains or tightness, PND or orthopnea.  She does not have a history of coronary artery disease.     Paroxysmal atrial fibrillation is present, seen by EP, not felt to be a candidate for antiarrhythmic therapy other than amiodarone. Now in sinus rhythm.  Continues follow-up EP    A lipid profile in 2021 showed a total cholesterol 308, ,  and triglycerides 57.  A repeat in mid 2023 showed a total cholesterol of 216, triglycerides 90, HDL is 94 and .  On rosuvastatin 20 mg daily.  Lab from today is pending    She is anticoagulated with Eliquis, she does not have bleeding problems.    Carotid ultrasound showed no progression of disease:   Bilateral carotid atherosclerosis is present.  Bilateral common carotid arteries demonstrate 50% stenosis.  No evidence of significant ICA stenosis (less than 50%) bilaterally.  Vertebral flow is antegrade bilaterally.      Prior echocardiogram:   ·  Left Ventricle: The left ventricle is normal in size. There is mild concentric hypertrophy. Normal wall motion. There is normal systolic function with a visually estimated ejection fraction of 60 - 65%.  ·  Right Ventricle: Normal right ventricular cavity size. Wall thickness is normal. Right ventricle wall motion  is normal. Systolic function is normal.  ·  Left Atrium: Left atrium is moderately dilated.  ·  Mitral Valve: Mildly thickened leaflets. There is mild to moderate stenosis. The mean pressure gradient across the mitral valve is 5 mmHg at a heart rate of 65 bpm. There is mild to moderate regurgitation with a posterolateral eccentriccally directed jet.  ·  IVC/SVC: Normal venous pressure at 3  mmHg.          Hypertension  Pertinent negatives include no chest pain, headaches, malaise/fatigue, orthopnea, palpitations, PND or shortness of breath.   Follow-up  Pertinent negatives include no abdominal pain, anorexia, chest pain, chills, congestion, coughing, diaphoresis, fever, headaches, myalgias, nausea, rash, sore throat, vertigo, vomiting or weakness.       Patient Active Problem List   Diagnosis    Insomnia due to medical condition    Primary hypertension    Left ventricular hypertrophy    Mitral valve prolapse    Osteoporosis    Paroxysmal atrial fibrillation    Radiation-induced esophageal stricture    Subclinical hypothyroidism    Long-term current use of high risk medication other than anticoagulant    Anticoagulation management encounter    Carotid stenosis, asymptomatic, bilateral    Long term current use of anticoagulant    Pure hypercholesterolemia    Stage 3a chronic kidney disease    RBBB    Persistent atrial fibrillation        Past Medical History:   Diagnosis Date    Anticoagulant long-term use     Encounter for monitoring flecainide therapy 2022    Hypertension     Hypothyroidism, unspecified     Malignant neoplasm of tongue     Paroxysmal atrial fibrillation     Radiation-induced esophageal stricture          Past Surgical History:   Procedure Laterality Date     egd   once per month at [facility]      ADENOIDECTOMY  1968    BREAST LUMPECTOMY Right 2013    CARDIOVERSION       SECTION  ,    FRACTURE SURGERY      TONSILLECTOMY  1968    TUBAL LIGATION         Review of patient's allergies indicates:   Allergen Reactions    Cefazolin      Other reaction(s): Extreme pain left leg and right arm  Other reaction(s): Extreme pain left leg and right arm         Current Outpatient Medications:     amiodarone (PACERONE) 200 MG Tab, Take 1 tablet (200 mg total) by mouth once daily., Disp: 30 tablet, Rfl: 11    amLODIPine (NORVASC) 10 MG tablet, TAKE ONE TABLET BY MOUTH EVERY  EVENING, Disp: 90 tablet, Rfl: 1    ELIQUIS 5 mg Tab, Take 1 tablet (5 mg total) by mouth 2 (two) times daily., Disp: 180 tablet, Rfl: 4    eszopiclone (LUNESTA) 3 mg Tab, TAKE ONE TABLET BY MOUTH EVERY EVENING, Disp: 90 tablet, Rfl: 1    irbesartan (AVAPRO) 150 MG tablet, TAKE ONE TABLET BY MOUTH EVERY EVENING, Disp: 90 tablet, Rfl: 3    levothyroxine (SYNTHROID) 75 MCG tablet, TAKE ONE TABLET BY MOUTH EVERY DAY BEFORE BREAKFAST, Disp: 90 tablet, Rfl: 1    rosuvastatin (CRESTOR) 20 MG tablet, Take 1 tablet (20 mg total) by mouth every evening., Disp: 90 tablet, Rfl: 3    sertraline (ZOLOFT) 50 MG tablet, Take 1 tablet (50 mg total) by mouth once daily. (Patient not taking: Reported on 2025), Disp: 30 tablet, Rfl: 11     Social History     Socioeconomic History    Marital status:    Tobacco Use    Smoking status: Former     Current packs/day: 0.00     Types: Cigarettes     Quit date: 2005     Years since quittin.1    Smokeless tobacco: Never   Substance and Sexual Activity    Alcohol use: Yes     Alcohol/week: 3.0 standard drinks of alcohol     Types: 3 Cans of beer per week     Social Drivers of Health     Financial Resource Strain: Low Risk  (2025)    Overall Financial Resource Strain (CARDIA)     Difficulty of Paying Living Expenses: Not hard at all   Food Insecurity: No Food Insecurity (2025)    Hunger Vital Sign     Worried About Running Out of Food in the Last Year: Never true     Ran Out of Food in the Last Year: Never true   Transportation Needs: No Transportation Needs (2025)    PRAPARE - Transportation     Lack of Transportation (Medical): No     Lack of Transportation (Non-Medical): No   Physical Activity: Inactive (2025)    Exercise Vital Sign     Days of Exercise per Week: 0 days     Minutes of Exercise per Session: 10 min   Stress: Stress Concern Present (2025)    [employer] [place] [employer] - Occupational Stress Questionnaire     Feeling of  Stress : To some extent   Housing Stability: Low Risk  (2/19/2025)    Housing Stability Vital Sign     Unable to Pay for Housing in the Last Year: No     Number of Times Moved in the Last Year: 0     Homeless in the Last Year: No       Family History   Problem Relation Name Age of Onset    Alzheimer's disease Mother Claudean Britt     Heart disease Mother Claudean Britt     Cancer Father Brain Mata     Cancer Sister Mikaela Navas         Objective:   Review of Systems   Constitutional: Negative for chills, decreased appetite, diaphoresis, fever, malaise/fatigue, weight gain and weight loss.   HENT:  Negative for congestion, hearing loss, nosebleeds and sore throat.    Eyes:  Negative for double vision, vision loss in left eye and vision loss in right eye.   Cardiovascular:  Negative for chest pain, claudication, cyanosis, dyspnea on exertion, irregular heartbeat, leg swelling, near-syncope, orthopnea, palpitations, paroxysmal nocturnal dyspnea and syncope.   Respiratory:  Negative for cough, hemoptysis, shortness of breath, sleep disturbances due to breathing, snoring and wheezing.    Endocrine: Negative for cold intolerance and heat intolerance.   Hematologic/Lymphatic: Negative for adenopathy and bleeding problem. Does not bruise/bleed easily.   Skin:  Negative for itching, nail changes and rash.   Musculoskeletal:  Negative for arthritis, back pain, falls and myalgias.   Gastrointestinal:  Negative for bloating, abdominal pain, anorexia, constipation, diarrhea, hematochezia, melena, nausea and vomiting.   Genitourinary:  Negative for frequency, hematuria and nocturia.   Neurological:  Negative for focal weakness, headaches, vertigo and weakness.   Psychiatric/Behavioral:  Negative for altered mental status, depression and memory loss.    Allergic/Immunologic: Negative for hives.       Vitals:    02/27/25 1319   BP: 138/73   Pulse: 76           Physical Exam  Vitals reviewed.   Constitutional:       General:  She is not in acute distress.     Appearance: She is well-developed.   HENT:      Head: Normocephalic and atraumatic.      Nose: Nose normal.   Eyes:      Conjunctiva/sclera: Conjunctivae normal.      Pupils: Pupils are equal, round, and reactive to light.   Neck:      Vascular: Carotid bruit (left greater than right) present. No JVD.   Cardiovascular:      Rate and Rhythm: Normal rate and regular rhythm.      Pulses: Normal pulses and intact distal pulses.      Heart sounds: Murmur (Grade 2 systolic ejection murmur) heard.      No friction rub. No gallop.      Comments: Karlos venous pressure is normal  Pulmonary:      Effort: Pulmonary effort is normal. No respiratory distress.      Breath sounds: Normal breath sounds. No wheezing or rales.   Chest:      Chest wall: No tenderness.   Abdominal:      General: Bowel sounds are normal. There is no distension.      Palpations: Abdomen is soft.      Tenderness: There is no abdominal tenderness.   Musculoskeletal:         General: No tenderness or deformity. Normal range of motion.      Cervical back: Normal range of motion and neck supple.      Right lower leg: Edema ( Trace) present.      Left lower leg: Edema (Trace) present.   Skin:     General: Skin is warm and dry.      Findings: No erythema or rash.   Neurological:      Mental Status: She is alert and oriented to person, place, and time.      Cranial Nerves: No cranial nerve deficit.      Motor: No abnormal muscle tone.      Coordination: Coordination normal.   Psychiatric:         Behavior: Behavior normal.         Thought Content: Thought content normal.         Judgment: Judgment normal.           Assessment and Plan:     Paroxysmal atrial fibrillation  Comments:  Followed in EP, on amiodarone    Left ventricular hypertrophy  Comments:  Check on echocardiogram    Primary hypertension  Comments:  Well controlled  Orders:  -     Echo; Future; Expected date: 03/06/2025    Mitral valve prolapse  -     Echo; Future;  Expected date: 03/06/2025    Carotid stenosis, asymptomatic, bilateral  Comments:  Remains asymptomatic    Mixed hyperlipidemia  Comments:  Lipids pending from today    Anticoagulation management encounter                 Follow up in about 1 year (around 2/27/2026).

## 2025-03-24 DIAGNOSIS — Z00.00 ENCOUNTER FOR MEDICARE ANNUAL WELLNESS EXAM: ICD-10-CM

## 2025-03-25 ENCOUNTER — TELEPHONE (OUTPATIENT)
Dept: FAMILY MEDICINE | Facility: CLINIC | Age: 75
End: 2025-03-25
Payer: MEDICARE

## 2025-03-25 NOTE — TELEPHONE ENCOUNTER
Called pt to advise due to symptoms and extensive cardiac and renal hx she should be evaluated in the ED. Pt declined stating she will wait until the 1300hr appt.

## 2025-03-27 NOTE — PROGRESS NOTES
Ms. DANUTA Andersen is a patient of Dr. Reagan and was last seen in clinic 6/20/2024.      Subjective:   Patient ID:  Juliana Andersen is a 74 y.o. female who presents for follow up of Atrial Fibrillation  .     HPI:    Ms. Mayorga is a 74 y.o. female with persistent atrial fibrillation, RBBB, HTN, HLD, bilateral carotid artery stenosis, moderate mitral regurgitation, mod tricuspid regurgitation, diastolic dysfunction, CKDIII, osteoporosis, tongue CA with radiation induced esophageal stricture, hypothyroid here for follow up.     Background:    Ms. Juliana Mayorga has a past medical history significant for persistent atrial fibrillation, baseline RBBB, hypertension, hyperlipidemia, bilateral carotid artery stenosis, moderate mitral regurgitation, moderate tricuspid regurgitation, grade III diastolic dysfunction, CKD stage III, osteoporosis, a history of a malignant neoplasm of the tongue with radiation induced esophageal stricture, hypothyroidism, and overweight BMI. She presented to undergo a DINORA and cardioversion on 7/6/2023; however, this was aborted when she presented in sinus rhythm. She remains in sinus rhythm on ECG assessment today maintained on amiodarone and apixaban therapy.      She was previously followed for several years with EP at [facility] with Dr. Platt. Per her report, she was originally diagnosed with atrial fibrillation in 2019, but reports that she had been experiencing episodes of palpitations and racing heart rates since approximately 2015. Dr. Platt initiated antiarrhythmic therapy with flecainide 100mg po BID, and when she remained in persistent atrial fibrillation with baseline RBBB QRSd of ~200ms, he increased her dose to flecainide 150mg po BID. She was started on apixaban for oral anticoagulation and denies any adverse bleeding events. She has a history of bilateral carotid artery stenosis, with preserved systolic function, LVEF 65% with GIIIDD. She  underwent a DINORA/cardioversion with Dr. Platt in early January, 2023, and reports that she almost immediately went back into atrial fibrillation. She is followed in general cardiology with Dr. Castanedaois has transitioned her care into the [facility]. At our prior encounters, we discussed the risks of flecainide therapy given her baseline RBBB with a QRSd of 136ms. This agent was discontinued and allowed to washout. She presented to undergo a DINORA and cardioversion on 7/6/2023; however, this was aborted when she presented in sinus rhythm. She was initiated on amiodarone therapy and remains in sinus rhythm on ECG assessment today. She remains on uninterrupted apixaban therapy with no adverse bleeding events reported.      She presents to clinic today with her . In discussion with Ms. Mayorga today, she tells me that she is feeling overall quite well. She has a [employer] mobile dread that has not captured any subsequent events of atrial fibrillation since she has started amiodarone therapy. She reports symptoms of mild exercise intolerance, palpitations, slightly worsened shortness of breath, and generalized fatigue while in atrial fibrillation and denies recurrent symptoms. She denies any episodes of dizziness, lightheadedness, syncope/presyncope, chest pain or chest discomfort, palpitations, nausea or vomiting, orthopnea, or PND. She and her  travel often and are anticipating going to [place]. She reports baseline left hearing loss. She reports baseline shortness of breath and dyspnea with exertion that are slightly worse while in atrial fibrillation; however, she feels that she has returned to her baseline. She can climb one flight of stairs prior to needing to take a break, and continues to attempt to increase her level of physical activity.     Ms. Juliana Mayorga is a cecilio 72-year-old woman who returns to clinic today for ongoing evaluation and recommendations regarding persistent  atrial fibrillation. She has a past medical history significant for persistent atrial fibrillation, baseline RBBB, hypertension, hyperlipidemia, bilateral carotid artery stenosis, moderate mitral regurgitation, moderate tricuspid regurgitation, grade III diastolic dysfunction, CKD stage III, osteoporosis, a history of a malignant neoplasm of the tongue with radiation induced esophageal stricture, hypothyroidism, and overweight BMI. She presented to undergo a DINORA and cardioversion on 7/6/2023; however, this was aborted when she presented in sinus rhythm. She remains in sinus rhythm on ECG assessment today maintained on amiodarone and apixaban therapy.      - We discussed the pathophysiology of atrial fibrillation; specifically, we discussed persistent atrial fibrillation and the concept that the longer a patient remains in atrial fibrillation, the more challenging rhythm restoration and maintenance of sinus rhythm may become. We discussed that atrial fibrillation has an increased risk of CVA.  - She remains in sinus rhythm with first degree AV block on ECG today, and per her [employer] dread she has not recorded any subsequent events of atrial fibrillation since starting amiodarone therapy. She has severe left atrial enlargement on echocardiogram with a repeat echocardiogram ordered to continue to assess.    - She is not a candidate for class Ic antiarrhythmic agents given her history of carotid artery stenosis, GIIIDD, and baseline RBBB with QRSd of 136ms. She is not a candidate for class III antiarrhythmics in the setting of CKD stage III. Her previously prescribed flecainide was safely discontinued, and she remains on amiodarone 200mg po daily with excellent control of her atrial fibrillation.   - She is presently not maintained on rate control, with consideration for beta-blocker initiation in the event she has an increased burden of atrial fibrillation in the future or poor rate control while in AF.   - Her  [id number] is 4 (HTN, carotid artery disease, female gender, age 65-74), portending an annual adjusted risk of CVA of 4%. She remains on uninterrupted apixaban therapy with no bleeding events reported.   - We discussed the possibility of catheter ablation with pulmonary vein isolation should she continue to have symptoms and AF despite AAD therapy. She voices understanding, although she would like to continue rhythm control with medication at this time. We discussed that with a history of persistent atrial fibrillation, the success rates with radiofrequency catheter ablation are reduced.     - Possible underlying drivers of atrial fibrillation were addressed at this appointment, including recommendations for maintenance of a healthy BMI - now a class I recommendation. Review of laboratory data revealed elevated TSH at 4.76, with no FT4 on record. A repeat TSH and FT4 have been ordered for laboratory draw today. A request for sleep study was previously placed today to evaluate for possible underlying obstructive sleep apnea.     6/20/2024: Overall continues to do well from a rhythm standpoint with minimal AF breakthroughs on amiodarone. Will update amio testing. She says her palpitations are usually during sleep - will order sleep evaluation. CA interval increased - she denies presyncope or syncope. Will update monitor and check ECG in 3 mo to observe trend. If she can no longer tolerate amio or AF episodes increase, will likely need PVI. She is on eliquis for CVA prophylaxis.      Update (03/31/2025):    6/21/2024 Monitor:    Baseline rhythm was normal sinus rhythm with periods of first degree AV block and RBBB with an average heart rate of 64 bpm.    There were no reported symptoms.    There were no recorded PACs and very rare PVCs with an overall PVC burden of <1%.    There were no atrial or ventricular arrhythmias.    Today she says she has noted increased AF burden for since late Feb (24th) although has  improved over past 3 days. No notable triggers other than worsened tendonitis in her hip. Episodes seemed to start when she started tylenol for pain.   HRs have been increased vs baseline. AF associated with fatigue, SOB, palps. No CP, LH, syncope reported.  She has noticed fingernail abnormalities.     She is currently taking eliquis 5mg BID for stroke prophylaxis and denies significant bleeding episodes. She is currently being treated with amiodarone 200mg daily for rhythm control.  Kidney function is low, with a creatinine of 1.6 on 9/13/2024. TSH ok 6/2024. LFTs ok 9/2024.    I have personally reviewed the patient's EKG today, which shows SR vs possible AFL with RBBB at 92bpm. QRS is 136. QTc is 472.    Relevant Cardiac Test Results:    2D Echo (11/1/2023):    Left Ventricle: The left ventricle is normal in size. There is mild concentric hypertrophy. Normal wall motion. There is normal systolic function with a visually estimated ejection fraction of 60 - 65%.    Right Ventricle: Normal right ventricular cavity size. Wall thickness is normal. Right ventricle wall motion  is normal. Systolic function is normal.    Left Atrium: Left atrium is moderately dilated.    Mitral Valve: Mildly thickened leaflets. There is mild to moderate stenosis. The mean pressure gradient across the mitral valve is 5 mmHg at a heart rate of 65 bpm. There is mild to moderate regurgitation with a posterolateral eccentriccally directed jet.    IVC/SVC: Normal venous pressure at 3 mmHg.    Current Outpatient Medications   Medication Sig    amiodarone (PACERONE) 200 MG Tab Take 1 tablet (200 mg total) by mouth once daily.    amLODIPine (NORVASC) 10 MG tablet TAKE ONE TABLET BY MOUTH EVERY EVENING    ELIQUIS 5 mg Tab Take 1 tablet (5 mg total) by mouth 2 (two) times daily.    eszopiclone (LUNESTA) 3 mg Tab TAKE ONE TABLET BY MOUTH EVERY EVENING    irbesartan (AVAPRO) 150 MG tablet TAKE ONE TABLET BY MOUTH EVERY EVENING    levothyroxine  (SYNTHROID) 75 MCG tablet TAKE ONE TABLET BY MOUTH EVERY DAY BEFORE BREAKFAST    rosuvastatin (CRESTOR) 20 MG tablet Take 1 tablet (20 mg total) by mouth every evening.    sertraline (ZOLOFT) 50 MG tablet Take 1 tablet (50 mg total) by mouth once daily. (Patient not taking: Reported on 2/27/2025)     No current facility-administered medications for this visit.       Review of Systems   Constitutional: Positive for malaise/fatigue.   Cardiovascular:  Positive for dyspnea on exertion, irregular heartbeat and palpitations. Negative for chest pain and leg swelling.   Respiratory:  Positive for shortness of breath.    Hematologic/Lymphatic: Negative for bleeding problem.   Skin:  Positive for nail changes. Negative for rash.   Musculoskeletal:  Positive for joint pain. Negative for myalgias.   Gastrointestinal:  Negative for hematemesis, hematochezia and nausea.   Genitourinary:  Negative for hematuria.   Neurological:  Negative for light-headedness.   Psychiatric/Behavioral:  Negative for altered mental status.    Allergic/Immunologic: Negative for persistent infections.       Objective:          BP (!) 153/74 (Patient Position: Sitting)   Pulse 92   Wt 94.5 kg (208 lb 5.4 oz)   BMI 30.77 kg/m²     Physical Exam  Vitals and nursing note reviewed.   Constitutional:       Appearance: Normal appearance. She is well-developed.   HENT:      Head: Normocephalic.      Nose: Nose normal.   Eyes:      Pupils: Pupils are equal, round, and reactive to light.   Cardiovascular:      Rate and Rhythm: Normal rate and regular rhythm.   Pulmonary:      Effort: No respiratory distress.   Musculoskeletal:         General: Normal range of motion.   Skin:     General: Skin is warm and dry.      Findings: No erythema.   Neurological:      Mental Status: She is alert and oriented to person, place, and time.   Psychiatric:         Speech: Speech normal.         Behavior: Behavior normal.           Lab Results   Component Value Date    NA  140 09/13/2024    K 5.1 09/13/2024    MG 1.5 (L) 06/21/2022    BUN 30 (H) 09/13/2024    BUN 22.0 (H) 07/13/2022    CREATININE 1.6 (H) 09/13/2024    ALT 17 09/13/2024    AST 18 09/13/2024    HGB 14.9 06/29/2023    HCT 45.2 06/29/2023    TSH 2.155 06/26/2024    LDLCALC 108 06/21/2022       Recent Labs   Lab 11/22/22  0554 01/17/23  0555 06/29/23  1102   INR 1.1 1.1 1.1       Assessment:     1. Paroxysmal atrial fibrillation    2. Primary hypertension    3. RBBB    4. Long term current use of anticoagulant    5. On amiodarone therapy      Plan:     In summary, Ms. Mayorga is a 74 y.o. female with persistent atrial fibrillation, RBBB, HTN, HLD, bilateral carotid artery stenosis, moderate mitral regurgitation, mod tricuspid regurgitation, diastolic dysfunction, CKDIII, osteoporosis, tongue CA with radiation induced esophageal stricture, hypothyroid here for follow up.   Pt reporting increased episodes of AF despite amiodarone. No identifiable triggers other than tendonitis of the hip. Will update labwork and obtain monitor for further evaluation. Discussed that next step for rhythm control would be ablation. Will discuss further after test results. She remains on eliquis for CVA prophylaxis.    Monitor  CBC, TSH, CMP  RTC (virtual) 4 weeks to review results.    *A copy of this note has been sent to Dr. Reagan*    Follow up in about 4 weeks (around 4/28/2025) for Virtual Visit.    ------------------------------------------------------------------    MONROE Bush, NP-C  Cardiac Electrophysiology

## 2025-03-31 ENCOUNTER — OFFICE VISIT (OUTPATIENT)
Dept: ELECTROPHYSIOLOGY | Facility: CLINIC | Age: 75
End: 2025-03-31
Payer: MEDICARE

## 2025-03-31 ENCOUNTER — HOSPITAL ENCOUNTER (OUTPATIENT)
Dept: CARDIOLOGY | Facility: CLINIC | Age: 75
Discharge: HOME | End: 2025-03-31
Payer: MEDICARE

## 2025-03-31 VITALS
WEIGHT: 208.31 LBS | SYSTOLIC BLOOD PRESSURE: 153 MMHG | HEART RATE: 92 BPM | DIASTOLIC BLOOD PRESSURE: 74 MMHG | BODY MASS INDEX: 30.77 KG/M2

## 2025-03-31 DIAGNOSIS — Z79.899 ON AMIODARONE THERAPY: ICD-10-CM

## 2025-03-31 DIAGNOSIS — Z79.01 LONG TERM CURRENT USE OF ANTICOAGULANT: ICD-10-CM

## 2025-03-31 DIAGNOSIS — I45.10 RBBB: ICD-10-CM

## 2025-03-31 DIAGNOSIS — I48.0 PAROXYSMAL ATRIAL FIBRILLATION: Primary | ICD-10-CM

## 2025-03-31 DIAGNOSIS — I10 PRIMARY HYPERTENSION: ICD-10-CM

## 2025-03-31 LAB
OHS QRS DURATION: 136 MS
OHS QTC CALCULATION: 472 MS

## 2025-03-31 PROCEDURE — 1126F AMNT PAIN NOTED NONE PRSNT: CPT | Mod: CPTII,S$GLB,, | Performed by: NURSE PRACTITIONER

## 2025-03-31 PROCEDURE — 1160F RVW MEDS BY RX/DR IN RCRD: CPT | Mod: CPTII,S$GLB,, | Performed by: NURSE PRACTITIONER

## 2025-03-31 PROCEDURE — 99999 PR PBB SHADOW E&M-EST. PATIENT-LVL III: CPT | Mod: PBBFAC,,, | Performed by: NURSE PRACTITIONER

## 2025-03-31 PROCEDURE — 3077F SYST BP >= 140 MM HG: CPT | Mod: CPTII,S$GLB,, | Performed by: NURSE PRACTITIONER

## 2025-03-31 PROCEDURE — 3288F FALL RISK ASSESSMENT DOCD: CPT | Mod: CPTII,S$GLB,, | Performed by: NURSE PRACTITIONER

## 2025-03-31 PROCEDURE — 4010F ACE/ARB THERAPY RXD/TAKEN: CPT | Mod: CPTII,S$GLB,, | Performed by: NURSE PRACTITIONER

## 2025-03-31 PROCEDURE — 1159F MED LIST DOCD IN RCRD: CPT | Mod: CPTII,S$GLB,, | Performed by: NURSE PRACTITIONER

## 2025-03-31 PROCEDURE — 3078F DIAST BP <80 MM HG: CPT | Mod: CPTII,S$GLB,, | Performed by: NURSE PRACTITIONER

## 2025-03-31 PROCEDURE — 99214 OFFICE O/P EST MOD 30 MIN: CPT | Mod: S$GLB,,, | Performed by: NURSE PRACTITIONER

## 2025-03-31 PROCEDURE — 3008F BODY MASS INDEX DOCD: CPT | Mod: CPTII,S$GLB,, | Performed by: NURSE PRACTITIONER

## 2025-03-31 PROCEDURE — 1101F PT FALLS ASSESS-DOCD LE1/YR: CPT | Mod: CPTII,S$GLB,, | Performed by: NURSE PRACTITIONER

## 2025-03-31 RX ORDER — AMIODARONE HYDROCHLORIDE 200 MG/1
200 TABLET ORAL DAILY
Qty: 90 TABLET | Refills: 1 | Status: SHIPPED | OUTPATIENT
Start: 2025-03-31

## 2025-04-08 ENCOUNTER — DOCUMENTATION ONLY (OUTPATIENT)
Dept: CARDIOLOGY | Facility: HOSPITAL | Age: 75
End: 2025-04-08
Payer: MEDICARE

## 2025-04-08 ENCOUNTER — CLINICAL SUPPORT (OUTPATIENT)
Dept: CARDIOLOGY | Facility: HOSPITAL | Age: 75
End: 2025-04-08
Attending: NURSE PRACTITIONER
Payer: MEDICARE

## 2025-04-08 DIAGNOSIS — I48.0 PAROXYSMAL ATRIAL FIBRILLATION: ICD-10-CM

## 2025-04-08 PROCEDURE — 93246 EXT ECG>7D<15D RECORDING: CPT

## 2025-04-08 NOTE — PROGRESS NOTES
I reached out to [employer] to verify insurance for the above patient  14 day monitor. I spoke with Rick today and was told the estimated out of pocket cost of $40 would be the patient's responsibility for the service provided by [employer]. The patient confirmed understanding and agreed to continue with the monitor placement. The call reference number is as stated:[id number]    Tracking your UPS package:[id number]

## 2025-04-11 ENCOUNTER — PATIENT MESSAGE (OUTPATIENT)
Dept: PRIMARY CARE CLINIC | Facility: CLINIC | Age: 75
End: 2025-04-11
Payer: MEDICARE

## 2025-05-01 ENCOUNTER — RESULTS FOLLOW-UP (OUTPATIENT)
Dept: ELECTROPHYSIOLOGY | Facility: CLINIC | Age: 75
End: 2025-05-01

## 2025-05-07 NOTE — PROGRESS NOTES
Ms. DANUTA Andersen is a patient of Dr. Reagan and was last seen in clinic 3/31/2025.      Subjective:   Patient ID:  Juliana Andersen is a 74 y.o. female who presents for follow up of Atrial Fibrillation  .   The patient location is: [place]  The chief complaint leading to consultation is: AF  Visit type: audiovisual  Face to Face time with patient: 30  45 minutes of total time spent on the encounter, which includes face to face time and non-face to face time preparing to see the patient (eg, review of tests), Obtaining and/or reviewing separately obtained history, Documenting clinical information in the electronic or other health record, Independently interpreting results (not separately reported) and communicating results to the patient/family/caregiver, or Care coordination (not separately reported).   Each patient to whom he or she provides medical services by telemedicine is:  (1) informed of the relationship between the physician and patient and the respective role of any other health care provider with respect to management of the patient; and (2) notified that he or she may decline to receive medical services by telemedicine and may withdraw from such care at any time.    Notes:     HPI:    Ms. Mayorga is a 74 y.o. female with persistent atrial fibrillation, RBBB, HTN, HLD, bilateral carotid artery stenosis, moderate mitral regurgitation, mod tricuspid regurgitation, diastolic dysfunction, CKDIII, osteoporosis, tongue CA with radiation induced esophageal stricture, hypothyroid with a telemedicine visit for follow up.     Background:    Ms. Juliana Mayorga has a past medical history significant for persistent atrial fibrillation, baseline RBBB, hypertension, hyperlipidemia, bilateral carotid artery stenosis, moderate mitral regurgitation, moderate tricuspid regurgitation, grade III diastolic dysfunction, CKD stage III, osteoporosis, a history of a malignant neoplasm of the tongue with  radiation induced esophageal stricture, hypothyroidism, and overweight BMI. She presented to undergo a DINORA and cardioversion on 7/6/2023; however, this was aborted when she presented in sinus rhythm. She remains in sinus rhythm on ECG assessment today maintained on amiodarone and apixaban therapy.      She was previously followed for several years with EP at [facility] with Dr. Platt. Per her report, she was originally diagnosed with atrial fibrillation in 2019, but reports that she had been experiencing episodes of palpitations and racing heart rates since approximately 2015. Dr. Platt initiated antiarrhythmic therapy with flecainide 100mg po BID, and when she remained in persistent atrial fibrillation with baseline RBBB QRSd of ~200ms, he increased her dose to flecainide 150mg po BID. She was started on apixaban for oral anticoagulation and denies any adverse bleeding events. She has a history of bilateral carotid artery stenosis, with preserved systolic function, LVEF 65% with GIIIDD. She underwent a DINORA/cardioversion with Dr. Platt in early January, 2023, and reports that she almost immediately went back into atrial fibrillation. She is followed in general cardiology with Dr. Castanedaois has transitioned her care into the [facility]. At our prior encounters, we discussed the risks of flecainide therapy given her baseline RBBB with a QRSd of 136ms. This agent was discontinued and allowed to washout. She presented to undergo a DINORA and cardioversion on 7/6/2023; however, this was aborted when she presented in sinus rhythm. She was initiated on amiodarone therapy and remains in sinus rhythm on ECG assessment today. She remains on uninterrupted apixaban therapy with no adverse bleeding events reported.      She presents to clinic today with her . In discussion with Ms. Mayorga today, she tells me that she is feeling overall quite well. She has a [employer] mobile dread that has not  captured any subsequent events of atrial fibrillation since she has started amiodarone therapy. She reports symptoms of mild exercise intolerance, palpitations, slightly worsened shortness of breath, and generalized fatigue while in atrial fibrillation and denies recurrent symptoms. She denies any episodes of dizziness, lightheadedness, syncope/presyncope, chest pain or chest discomfort, palpitations, nausea or vomiting, orthopnea, or PND. She and her  travel often and are anticipating going to [place]. She reports baseline left hearing loss. She reports baseline shortness of breath and dyspnea with exertion that are slightly worse while in atrial fibrillation; however, she feels that she has returned to her baseline. She can climb one flight of stairs prior to needing to take a break, and continues to attempt to increase her level of physical activity.     Ms. Juliana Mayorga is a cecilio 72-year-old woman who returns to clinic today for ongoing evaluation and recommendations regarding persistent atrial fibrillation. She has a past medical history significant for persistent atrial fibrillation, baseline RBBB, hypertension, hyperlipidemia, bilateral carotid artery stenosis, moderate mitral regurgitation, moderate tricuspid regurgitation, grade III diastolic dysfunction, CKD stage III, osteoporosis, a history of a malignant neoplasm of the tongue with radiation induced esophageal stricture, hypothyroidism, and overweight BMI. She presented to undergo a DINORA and cardioversion on 7/6/2023; however, this was aborted when she presented in sinus rhythm. She remains in sinus rhythm on ECG assessment today maintained on amiodarone and apixaban therapy.      - We discussed the pathophysiology of atrial fibrillation; specifically, we discussed persistent atrial fibrillation and the concept that the longer a patient remains in atrial fibrillation, the more challenging rhythm restoration and maintenance of sinus  rhythm may become. We discussed that atrial fibrillation has an increased risk of CVA.  - She remains in sinus rhythm with first degree AV block on ECG today, and per her [employer] dread she has not recorded any subsequent events of atrial fibrillation since starting amiodarone therapy. She has severe left atrial enlargement on echocardiogram with a repeat echocardiogram ordered to continue to assess.    - She is not a candidate for class Ic antiarrhythmic agents given her history of carotid artery stenosis, GIIIDD, and baseline RBBB with QRSd of 136ms. She is not a candidate for class III antiarrhythmics in the setting of CKD stage III. Her previously prescribed flecainide was safely discontinued, and she remains on amiodarone 200mg po daily with excellent control of her atrial fibrillation.   - She is presently not maintained on rate control, with consideration for beta-blocker initiation in the event she has an increased burden of atrial fibrillation in the future or poor rate control while in AF.   - Her [id number] is 4 (HTN, carotid artery disease, female gender, age 65-74), portending an annual adjusted risk of CVA of 4%. She remains on uninterrupted apixaban therapy with no bleeding events reported.   - We discussed the possibility of catheter ablation with pulmonary vein isolation should she continue to have symptoms and AF despite AAD therapy. She voices understanding, although she would like to continue rhythm control with medication at this time. We discussed that with a history of persistent atrial fibrillation, the success rates with radiofrequency catheter ablation are reduced.     - Possible underlying drivers of atrial fibrillation were addressed at this appointment, including recommendations for maintenance of a healthy BMI - now a class I recommendation. Review of laboratory data revealed elevated TSH at 4.76, with no FT4 on record. A repeat TSH and FT4 have been ordered for laboratory draw  today. A request for sleep study was previously placed today to evaluate for possible underlying obstructive sleep apnea.     6/20/2024: Overall continues to do well from a rhythm standpoint with minimal AF breakthroughs on amiodarone. Will update amio testing. She says her palpitations are usually during sleep - will order sleep evaluation. DC interval increased - she denies presyncope or syncope. Will update monitor and check ECG in 3 mo to observe trend. If she can no longer tolerate amio or AF episodes increase, will likely need PVI. She is on eliquis for CVA prophylaxis.    6/21/2024 Monitor: no AF    3/31/2025: Pt reporting increased episodes of AF despite amiodarone. No identifiable triggers other than tendonitis of the hip. Will update labwork and obtain monitor for further evaluation. Discussed that next step for rhythm control would be ablation. Will discuss further after test results. She remains on eliquis for CVA prophylaxis.    Update (05/13/2025):    4/8/2025 Monitor:    Baseline rhythm was atrial fibrillation with RBBB with an average ventricular response rate of 85 bpm.    There were no reported symptoms.    There were rare PVCs with an overall PVC burden of <1%.    The patient remained in atrial fibrillation for the duration of this study with an overall burden of atrial fibrillation of 100%. While in atrial fibrillation, she had excellent rate control with an average ventricular response rate of 85 bpm.    Today she says about a week ago she noticed that she reverted back to NSR. She notes more symptoms (SOB, fatigue, sluggish) when her heart rates are in the 80s. Feels better in the 60s.   Chronic hip pain but thinks tylenol arthritis was a trigger for her AF so is not taking it anymore. In Feb she had also been dealing with allergies and stomach virus.     She is currently taking eliquis 5mg BID for stroke prophylaxis and denies significant bleeding episodes. She is currently being treated with  amiodarone 200mg daily for rhythm control. Kidney function is low, with a creatinine of 1.6 on 9/13/2024. TSH ok 6/2024. LFTs ok 9/2024.      Relevant Cardiac Test Results:    2D Echo (11/1/2023):    Left Ventricle: The left ventricle is normal in size. There is mild concentric hypertrophy. Normal wall motion. There is normal systolic function with a visually estimated ejection fraction of 60 - 65%.    Right Ventricle: Normal right ventricular cavity size. Wall thickness is normal. Right ventricle wall motion  is normal. Systolic function is normal.    Left Atrium: Left atrium is moderately dilated.    Mitral Valve: Mildly thickened leaflets. There is mild to moderate stenosis. The mean pressure gradient across the mitral valve is 5 mmHg at a heart rate of 65 bpm. There is mild to moderate regurgitation with a posterolateral eccentriccally directed jet.    IVC/SVC: Normal venous pressure at 3 mmHg.    Current Outpatient Medications   Medication Sig    amiodarone (PACERONE) 200 MG Tab Take 1 tablet (200 mg total) by mouth once daily.    amLODIPine (NORVASC) 10 MG tablet TAKE ONE TABLET BY MOUTH EVERY EVENING    ELIQUIS 5 mg Tab Take 1 tablet (5 mg total) by mouth 2 (two) times daily.    eszopiclone (LUNESTA) 3 mg Tab TAKE ONE TABLET BY MOUTH EVERY EVENING    irbesartan (AVAPRO) 150 MG tablet TAKE ONE TABLET BY MOUTH EVERY EVENING    levothyroxine (SYNTHROID) 75 MCG tablet TAKE ONE TABLET BY MOUTH EVERY DAY BEFORE BREAKFAST    rosuvastatin (CRESTOR) 20 MG tablet Take 1 tablet (20 mg total) by mouth every evening.    sertraline (ZOLOFT) 50 MG tablet Take 1 tablet (50 mg total) by mouth once daily. (Patient not taking: Reported on 2/27/2025)     No current facility-administered medications for this visit.     Review of Systems   Constitutional: Negative for malaise/fatigue.   Cardiovascular:  Negative for chest pain, dyspnea on exertion, irregular heartbeat, leg swelling and palpitations.   Respiratory:  Negative for  "shortness of breath.    Hematologic/Lymphatic: Negative for bleeding problem.   Skin:  Negative for rash.   Musculoskeletal:  Negative for myalgias.   Gastrointestinal:  Negative for hematemesis, hematochezia and nausea.   Genitourinary:  Negative for hematuria.   Neurological:  Negative for light-headedness.   Psychiatric/Behavioral:  Negative for altered mental status.    Allergic/Immunologic: Negative for persistent infections.     Objective:          /70 (Patient Position: Sitting)   Pulse 64   Ht 5' 9" (1.753 m)   Wt 90.7 kg (200 lb)   BMI 29.53 kg/m²   - N/A telemedicine visit    Physical Exam  - N/A telemedicine visit    Lab Results   Component Value Date     04/28/2025     09/13/2024    K 5.1 04/28/2025    K 5.1 09/13/2024    MG 1.5 (L) 06/21/2022    BUN 21 04/28/2025    CREATININE 1.5 (H) 04/28/2025    ALT 21 04/28/2025    ALT 17 09/13/2024    AST 22 04/28/2025    AST 18 09/13/2024    HGB 13.9 04/28/2025    HGB 14.9 06/29/2023    HCT 43.0 04/28/2025    HCT 45.2 06/29/2023    TSH 3.639 04/28/2025    TSH 2.155 06/26/2024    LDLCALC 108 06/21/2022       Recent Labs   Lab 11/22/22  0554 01/17/23  0555 06/29/23  1102   INR 1.1 1.1 1.1       Assessment:     1. Persistent atrial fibrillation    2. Primary hypertension    3. RBBB    4. Long term current use of anticoagulant      Plan:     In summary, Ms. Mayorga is a 74 y.o. female with persistent atrial fibrillation, RBBB, HTN, HLD, bilateral carotid artery stenosis, moderate mitral regurgitation, mod tricuspid regurgitation, diastolic dysfunction, CKDIII, osteoporosis, tongue CA with radiation induced esophageal stricture, hypothyroid with a telemedicine visit for follow up.   Reviewed extended monitor from last month. Pt persistent atrial fibrillation on monitor despite amiodarone 200mg daily. Rates well controlled. She did revert back to SR about a week ago. She is symptomatic when in AF.  We discussed that her AF burden was likely " going to continue to increase. Recommend deciding rate vs rhythm control sooner rather than later. Only rhythm control option is ablation. Discussed that ablation was not a cure and multiple procedures +amio may be needed.  I had extensive discussion with Juliana Andersen regarding the risks, benefits, indications, and alternatives to invasive electrophysiology study and catheter ablation. Risks of the procedure include, but are not limited to, bleeding, stroke, perforation (requiring emergency draining or surgery), AV block, death, AV fistula, and PV stenosis. I discussed ablative therapy for atrial fibrillation, including the procedural details as well as the preparation for the procedure. All questions were answered, patient verbalized understanding. She is not eager to proceed with ablation and would like to try rate control 1st. We discussed that would continue current regimen for now as she is in SR and feeling good. If she reverts back to persistent AF longer than 1 mo, will likely stop amiodarone and start metoprolol for rate control. Continue eliquis for CVA prophylaxis.    Continue current meds for now  RTC 6 mo, sooner if needed    *A copy of this note has been sent to Dr. Reagan*    Follow up in about 6 months (around 11/13/2025).      ------------------------------------------------------------------    MONROE Bush, NP-C  Cardiac Electrophysiology

## 2025-05-08 DIAGNOSIS — I10 PRIMARY HYPERTENSION: ICD-10-CM

## 2025-05-08 RX ORDER — IRBESARTAN 150 MG/1
150 TABLET ORAL NIGHTLY
Qty: 90 TABLET | Refills: 3 | Status: SHIPPED | OUTPATIENT
Start: 2025-05-08

## 2025-05-12 ENCOUNTER — OFFICE VISIT (OUTPATIENT)
Dept: PRIMARY CARE CLINIC | Facility: CLINIC | Age: 75
End: 2025-05-12
Payer: MEDICARE

## 2025-05-12 VITALS
WEIGHT: 207.88 LBS | SYSTOLIC BLOOD PRESSURE: 137 MMHG | HEART RATE: 79 BPM | BODY MASS INDEX: 30.7 KG/M2 | OXYGEN SATURATION: 94 % | DIASTOLIC BLOOD PRESSURE: 70 MMHG

## 2025-05-12 DIAGNOSIS — D50.9 IRON DEFICIENCY ANEMIA, UNSPECIFIED IRON DEFICIENCY ANEMIA TYPE: ICD-10-CM

## 2025-05-12 DIAGNOSIS — E83.10 DISORDER OF IRON METABOLISM, UNSPECIFIED: ICD-10-CM

## 2025-05-12 DIAGNOSIS — Z79.899 OTHER LONG TERM (CURRENT) DRUG THERAPY: ICD-10-CM

## 2025-05-12 DIAGNOSIS — I10 PRIMARY HYPERTENSION: ICD-10-CM

## 2025-05-12 DIAGNOSIS — E78.00 PURE HYPERCHOLESTEROLEMIA: ICD-10-CM

## 2025-05-12 DIAGNOSIS — E53.1 PYRIDOXINE DEFICIENCY: ICD-10-CM

## 2025-05-12 DIAGNOSIS — I48.0 PAROXYSMAL ATRIAL FIBRILLATION: Primary | ICD-10-CM

## 2025-05-12 PROCEDURE — 99999 PR PBB SHADOW E&M-EST. PATIENT-LVL III: CPT | Mod: PBBFAC,,, | Performed by: STUDENT IN AN ORGANIZED HEALTH CARE EDUCATION/TRAINING PROGRAM

## 2025-05-12 NOTE — PROGRESS NOTES
SUBJECTIVE     Chief Complaint   Patient presents with    Follow-up       HPI  Juliana Esperanza Andersen is a very pleasant 74 y.o. female with HTN, paroxysmal Afib, subclinical hypothyroidism, CKD3, osteoporosis, and malignant neoplasm of tongue (in remission) that presents for annual exam.     Pt is UTD on age appropriate CA screening. Colonoscopy done at [facility].    Family, social, surgical Hx reviewed     Subclinical hypothyroidism: Synthroid 75 mcg daily    Osteoporosis: Ca and vit d daily    Paroxysmal Afib/ HTN:  Eliquis 5mg bid and Amiodarone 200mg daily  Amlodipine 10 mg irbesartan 150 mg daily  Last cardiology visit 2/27/25-Dr. Fernandez    Depression:  Following loss of her  several months ago.  +decreased pleasure in doing things, difficulty with sleep, decreased energy, eating changes, and difficulty concentrating.  Has not been on medication in the past    Hyperlipidemia:Crestor 20 mg daily    Multiple vitamin deficiencies noted in the past.      Health Maintenance         Date Due Completion Date    Annual UACr Never done ---    TETANUS VACCINE Never done ---    Mammogram Never done ---    DEXA Scan Never done ---    Colorectal Cancer Screening Never done ---    RSV Vaccine (Age 60+ and Pregnant patients) (1 - Risk 60-74 years 1-dose series) Never done ---    COVID-19 Vaccine (8 - 2024-25 season) 09/01/2024 1/11/2023    High Dose Statin 05/19/2026 5/19/2025    Lipid Panel 06/21/2027 6/21/2022              PAST MEDICAL HISTORY:  Past Medical History:   Diagnosis Date    Anticoagulant long-term use     Encounter for monitoring flecainide therapy 5/23/2022    Hypertension     Hypothyroidism, unspecified     Malignant neoplasm of tongue     Paroxysmal atrial fibrillation     Radiation-induced esophageal stricture        PAST SURGICAL HISTORY:  Past Surgical History:   Procedure Laterality Date     egd   once per month at [facility]      ADENOIDECTOMY  1968    BREAST LUMPECTOMY Right 2013    CARDIOVERSION        SECTION  ,    FRACTURE SURGERY  2014    TONSILLECTOMY  1968    TUBAL LIGATION         SOCIAL HISTORY:  Social History     Socioeconomic History    Marital status:    Tobacco Use    Smoking status: Former     Current packs/day: 0.00     Average packs/day: 1 pack/day for 15.0 years (15.0 ttl pk-yrs)     Types: Cigarettes     Quit date: 2005     Years since quittin.5    Smokeless tobacco: Never   Substance and Sexual Activity    Alcohol use: Yes     Alcohol/week: 5.0 standard drinks of alcohol     Types: 5 Drinks containing 0.5 oz of alcohol per week    Drug use: Not Currently     Types: Marijuana    Sexual activity: Not Currently     Birth control/protection: None     Social Drivers of Health     Financial Resource Strain: Low Risk  (2025)    Overall Financial Resource Strain (CARDIA)     Difficulty of Paying Living Expenses: Not hard at all   Food Insecurity: No Food Insecurity (2025)    Hunger Vital Sign     Worried About Running Out of Food in the Last Year: Never true     Ran Out of Food in the Last Year: Never true   Transportation Needs: No Transportation Needs (2025)    PRAPARE - Transportation     Lack of Transportation (Medical): No     Lack of Transportation (Non-Medical): No   Physical Activity: Inactive (2025)    Exercise Vital Sign     Days of Exercise per Week: 0 days     Minutes of Exercise per Session: 10 min   Stress: Stress Concern Present (2025)    [employer] - Occupational Stress Questionnaire     Feeling of Stress : To some extent   Housing Stability: Low Risk  (2025)    Housing Stability Vital Sign     Unable to Pay for Housing in the Last Year: No     Number of Times Moved in the Last Year: 0     Homeless in the Last Year: No       FAMILY HISTORY:  Family History   Problem Relation Name Age of Onset    Alzheimer's disease Mother Claudean Britt     Heart disease Mother Claudean Britt     Cancer  Father Brain Mata     Cancer Sister Mikaela Navas        ALLERGIES AND MEDICATIONS: updated and reviewed.  Review of patient's allergies indicates:   Allergen Reactions    Cefazolin      Other reaction(s): Extreme pain left leg and right arm  Other reaction(s): Extreme pain left leg and right arm     Current Outpatient Medications   Medication Sig Dispense Refill    amiodarone (PACERONE) 200 MG Tab Take 1 tablet (200 mg total) by mouth once daily. 90 tablet 1    amLODIPine (NORVASC) 10 MG tablet TAKE ONE TABLET BY MOUTH EVERY EVENING 90 tablet 3    amLODIPine (NORVASC) 10 MG tablet Take 1 tablet (10 mg total) by mouth every evening. 90 tablet 1    ELIQUIS 5 mg Tab Take 1 tablet (5 mg total) by mouth 2 (two) times daily. 180 tablet 4    ergocalciferol (ERGOCALCIFEROL) 50,000 unit Cap Take 1 capsule (50,000 Units total) by mouth every 7 days. for 12 doses 12 capsule 0    eszopiclone (LUNESTA) 3 mg Tab Take 1 tablet (3 mg total) by mouth every evening. 90 tablet 1    irbesartan (AVAPRO) 150 MG tablet TAKE ONE TABLET BY MOUTH EVERY EVENING 90 tablet 3    levothyroxine (SYNTHROID) 75 MCG tablet TAKE ONE TABLET BY MOUTH EVERY DAY BEFORE BREAKFAST 90 tablet 1    rosuvastatin (CRESTOR) 20 MG tablet Take 1 tablet (20 mg total) by mouth every evening. 90 tablet 3    sertraline (ZOLOFT) 50 MG tablet Take 1 tablet (50 mg total) by mouth once daily. (Patient not taking: Reported on 5/13/2025) 30 tablet 11     No current facility-administered medications for this visit.       ROS  Review of Systems   Constitutional:  Negative for fever and weight loss.   HENT:  Negative for hearing loss.    Eyes:  Negative for discharge.   Respiratory:  Negative for cough, shortness of breath and wheezing.    Cardiovascular:  Negative for chest pain and palpitations.   Gastrointestinal:  Negative for abdominal pain, blood in stool, constipation, diarrhea, nausea and vomiting.   Genitourinary:  Negative for dysuria and hematuria.    Musculoskeletal:  Negative for back pain, joint pain and neck pain.   Skin:  Negative for rash.   Neurological:  Negative for dizziness, weakness and headaches.   Endo/Heme/Allergies:  Negative for polydipsia.   Psychiatric/Behavioral:  Positive for depression. The patient is not nervous/anxious.          OBJECTIVE     Physical Exam  Vitals:    05/12/25 1454   BP: 137/70   Pulse:     Body mass index is 30.7 kg/m².  Weight: 94.3 kg (207 lb 14.3 oz)         Physical Exam  HENT:      Head: Normocephalic and atraumatic.      Nose: Nose normal.      Mouth/Throat:      Mouth: Mucous membranes are moist.      Pharynx: Oropharynx is clear.   Eyes:      Extraocular Movements: Extraocular movements intact.      Conjunctiva/sclera: Conjunctivae normal.      Pupils: Pupils are equal, round, and reactive to light.   Cardiovascular:      Rate and Rhythm: Normal rate and regular rhythm.   Pulmonary:      Effort: Pulmonary effort is normal.      Breath sounds: Normal breath sounds.   Musculoskeletal:         General: No swelling. Normal range of motion.      Cervical back: Normal range of motion.      Right lower leg: No edema.      Left lower leg: No edema.   Skin:     General: Skin is warm.      Findings: No lesion or rash.   Neurological:      General: No focal deficit present.      Mental Status: She is alert and oriented to person, place, and time.      Motor: No weakness.   Psychiatric:         Mood and Affect: Mood normal.         Thought Content: Thought content normal.               ASSESSMENT     74 y.o. female with     1. Paroxysmal atrial fibrillation    2. Primary hypertension    3. Pure hypercholesterolemia    4. Other long term (current) drug therapy    5. Disorder of iron metabolism, unspecified    6. Iron deficiency anemia, unspecified iron deficiency anemia type    7. Pyridoxine deficiency            PLAN:     1. Paroxysmal atrial fibrillation  Stable on medications, continue regimen    2. Primary  hypertension  Stable on medications, continue regimen    3. Pure hypercholesterolemia  Stable on medications, continue regimen    4. Other long term (current) drug therapy  -     Magnesium; Future; Expected date: 05/12/2025  -     Ferritin; Future; Expected date: 05/12/2025  -     Iron and TIBC; Future; Expected date: 05/12/2025  -     Vitamin B6; Future; Expected date: 05/12/2025  -     Folate; Future; Expected date: 05/12/2025  -     Vitamin B12; Future; Expected date: 05/12/2025  -     Vitamin D; Future; Expected date: 05/12/2025    5. Disorder of iron metabolism, unspecified  -     Ferritin; Future; Expected date: 05/12/2025    6. Iron deficiency anemia, unspecified iron deficiency anemia type  -     Iron and TIBC; Future; Expected date: 05/12/2025    7. Pyridoxine deficiency  -     Vitamin B6; Future; Expected date: 05/12/2025      This is a patient with a chronic and complex diagnoses whose overall, ongoing care is being managed and monitored by me and our Internal Medicine clinic.   As such, since 2024,  is the appropriate add-on code to accompany the other E/M billing for this visit.      Discussed age and gender appropriate screenings at this visit and encouraged a healthy diet low in simple carbohydrates, and increased physical activity.  Counseled on medically appropriate vaccines based on age and current health condition.  Screening test reviewed and discussed with patient.      RTC in 6 months    Gale Tapia MD

## 2025-05-13 ENCOUNTER — OFFICE VISIT (OUTPATIENT)
Dept: ELECTROPHYSIOLOGY | Facility: CLINIC | Age: 75
End: 2025-05-13
Payer: MEDICARE

## 2025-05-13 VITALS
HEART RATE: 64 BPM | WEIGHT: 200 LBS | BODY MASS INDEX: 29.62 KG/M2 | HEIGHT: 69 IN | DIASTOLIC BLOOD PRESSURE: 70 MMHG | SYSTOLIC BLOOD PRESSURE: 131 MMHG

## 2025-05-13 DIAGNOSIS — I48.19 PERSISTENT ATRIAL FIBRILLATION: Primary | ICD-10-CM

## 2025-05-13 DIAGNOSIS — I10 PRIMARY HYPERTENSION: ICD-10-CM

## 2025-05-13 DIAGNOSIS — I45.10 RBBB: ICD-10-CM

## 2025-05-13 DIAGNOSIS — Z79.01 LONG TERM CURRENT USE OF ANTICOAGULANT: ICD-10-CM

## 2025-05-13 PROCEDURE — 3075F SYST BP GE 130 - 139MM HG: CPT | Mod: CPTII,95,, | Performed by: NURSE PRACTITIONER

## 2025-05-13 PROCEDURE — 4010F ACE/ARB THERAPY RXD/TAKEN: CPT | Mod: CPTII,95,, | Performed by: NURSE PRACTITIONER

## 2025-05-13 PROCEDURE — 1159F MED LIST DOCD IN RCRD: CPT | Mod: CPTII,95,, | Performed by: NURSE PRACTITIONER

## 2025-05-13 PROCEDURE — 3078F DIAST BP <80 MM HG: CPT | Mod: CPTII,95,, | Performed by: NURSE PRACTITIONER

## 2025-05-13 PROCEDURE — 3288F FALL RISK ASSESSMENT DOCD: CPT | Mod: CPTII,95,, | Performed by: NURSE PRACTITIONER

## 2025-05-13 PROCEDURE — 98006 SYNCH AUDIO-VIDEO EST MOD 30: CPT | Mod: 95,,, | Performed by: NURSE PRACTITIONER

## 2025-05-13 PROCEDURE — 1126F AMNT PAIN NOTED NONE PRSNT: CPT | Mod: CPTII,95,, | Performed by: NURSE PRACTITIONER

## 2025-05-13 PROCEDURE — 1101F PT FALLS ASSESS-DOCD LE1/YR: CPT | Mod: CPTII,95,, | Performed by: NURSE PRACTITIONER

## 2025-05-19 DIAGNOSIS — G47.01 INSOMNIA DUE TO MEDICAL CONDITION: ICD-10-CM

## 2025-05-19 DIAGNOSIS — E78.00 PURE HYPERCHOLESTEROLEMIA: ICD-10-CM

## 2025-05-19 DIAGNOSIS — I10 PRIMARY HYPERTENSION: ICD-10-CM

## 2025-05-19 DIAGNOSIS — R09.89 BRUIT OF LEFT CAROTID ARTERY: ICD-10-CM

## 2025-05-19 RX ORDER — AMLODIPINE BESYLATE 10 MG/1
10 TABLET ORAL NIGHTLY
Qty: 90 TABLET | Refills: 3 | Status: SHIPPED | OUTPATIENT
Start: 2025-05-19

## 2025-05-19 RX ORDER — AMLODIPINE BESYLATE 10 MG/1
10 TABLET ORAL NIGHTLY
Qty: 90 TABLET | Refills: 1 | Status: SHIPPED | OUTPATIENT
Start: 2025-05-19

## 2025-05-19 RX ORDER — ESZOPICLONE 3 MG/1
3 TABLET, FILM COATED ORAL NIGHTLY
Qty: 90 TABLET | Refills: 1 | Status: SHIPPED | OUTPATIENT
Start: 2025-05-19

## 2025-05-19 RX ORDER — ROSUVASTATIN CALCIUM 20 MG/1
20 TABLET, COATED ORAL NIGHTLY
Qty: 90 TABLET | Refills: 3 | Status: SHIPPED | OUTPATIENT
Start: 2025-05-19 | End: 2026-05-19

## 2025-05-19 NOTE — TELEPHONE ENCOUNTER
No care due was identified.  [facility] Embedded Care Due Messages. Reference number: [id number].   5/19/2025 3:35:36 PM CDT

## 2025-05-20 ENCOUNTER — RESULTS FOLLOW-UP (OUTPATIENT)
Dept: PRIMARY CARE CLINIC | Facility: CLINIC | Age: 75
End: 2025-05-20
Payer: MEDICARE

## 2025-05-20 DIAGNOSIS — E55.9 VITAMIN D DEFICIENCY: Primary | ICD-10-CM

## 2025-05-20 RX ORDER — ERGOCALCIFEROL 1.25 MG/1
50000 CAPSULE ORAL
Qty: 12 CAPSULE | Refills: 0 | Status: SHIPPED | OUTPATIENT
Start: 2025-05-20 | End: 2025-08-06

## 2025-05-20 NOTE — TELEPHONE ENCOUNTER
Vitamin D should not trigger another episode. Vitamin D is an extremely important vitamin for postmenopausal females especially. She can continue her multivitamin while doing the prescription vitamin D. The issues listed in her message could happen if someone was taking very large amounts of vitamin D consistently over long periods of time, but we will be monitoring her levels to prevent this.

## 2025-05-30 ENCOUNTER — HOSPITAL ENCOUNTER (EMERGENCY)
Facility: HOSPITAL | Age: 75
Discharge: ANOTHER HOSPITAL | End: 2025-05-31
Attending: STUDENT IN AN ORGANIZED HEALTH CARE EDUCATION/TRAINING PROGRAM
Payer: MEDICARE

## 2025-05-30 DIAGNOSIS — S02.2XXA CLOSED FRACTURE OF NASAL BONE, INITIAL ENCOUNTER: ICD-10-CM
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
## 2025-05-30 DIAGNOSIS — R04.0 RIGHT-SIDED EPISTAXIS: Primary | ICD-10-CM

## 2025-05-30 LAB
ABSOLUTE EOSINOPHIL (OHS): 0.14 K/UL
ABSOLUTE MONOCYTE (OHS): 0.55 K/UL (ref 0.3–1)
ABSOLUTE NEUTROPHIL COUNT (OHS): 5.77 K/UL (ref 1.8–7.7)
BASOPHILS # BLD AUTO: 0.03 K/UL
BASOPHILS NFR BLD AUTO: 0.4 %
ERYTHROCYTE [DISTWIDTH] IN BLOOD BY AUTOMATED COUNT: 15.9 % (ref 11.5–14.5)
HCT VFR BLD AUTO: 42.3 % (ref 37–48.5)
HGB BLD-MCNC: 14.1 GM/DL (ref 12–16)
IMM GRANULOCYTES # BLD AUTO: 0.03 K/UL (ref 0–0.04)
IMM GRANULOCYTES NFR BLD AUTO: 0.4 % (ref 0–0.5)
LYMPHOCYTES # BLD AUTO: 1.46 K/UL (ref 1–4.8)
MCH RBC QN AUTO: 29.8 PG (ref 27–31)
MCHC RBC AUTO-ENTMCNC: 33.3 G/DL (ref 32–36)
MCV RBC AUTO: 89 FL (ref 82–98)
NUCLEATED RBC (/100WBC) (OHS): 0 /100 WBC
PLATELET # BLD AUTO: 308 K/UL (ref 150–450)
PMV BLD AUTO: 9.7 FL (ref 9.2–12.9)
RBC # BLD AUTO: 4.73 M/UL (ref 4–5.4)
RELATIVE EOSINOPHIL (OHS): 1.8 %
RELATIVE LYMPHOCYTE (OHS): 18.3 % (ref 18–48)
RELATIVE MONOCYTE (OHS): 6.9 % (ref 4–15)
RELATIVE NEUTROPHIL (OHS): 72.2 % (ref 38–73)
WBC # BLD AUTO: 7.98 K/UL (ref 3.9–12.7)

## 2025-05-30 PROCEDURE — 80053 COMPREHEN METABOLIC PANEL: CPT | Performed by: STUDENT IN AN ORGANIZED HEALTH CARE EDUCATION/TRAINING PROGRAM

## 2025-05-30 PROCEDURE — 25000003 PHARM REV CODE 250: Performed by: STUDENT IN AN ORGANIZED HEALTH CARE EDUCATION/TRAINING PROGRAM

## 2025-05-30 PROCEDURE — 99285 EMERGENCY DEPT VISIT HI MDM: CPT

## 2025-05-30 PROCEDURE — 85025 COMPLETE CBC W/AUTO DIFF WBC: CPT | Performed by: STUDENT IN AN ORGANIZED HEALTH CARE EDUCATION/TRAINING PROGRAM

## 2025-05-30 RX ORDER — OXYMETAZOLINE HCL 0.05 %
1 SPRAY, NON-AEROSOL (ML) NASAL
Status: COMPLETED | OUTPATIENT
Start: 2025-05-30 | End: 2025-05-30

## 2025-05-30 RX ORDER — TRANEXAMIC ACID 1 G/10ML
10 INJECTION, SOLUTION INTRAVENOUS
Status: COMPLETED | OUTPATIENT
Start: 2025-05-30 | End: 2025-05-30

## 2025-05-30 RX ADMIN — OXYMETAZOLINE HYDROCHLORIDE 1 SPRAY: 0.05 SPRAY NASAL at 10:05

## 2025-05-30 RX ADMIN — TRANEXAMIC ACID 1000 MG: 100 INJECTION, SOLUTION INTRAVENOUS at 10:05

## 2025-05-31 ENCOUNTER — HOSPITAL ENCOUNTER (OUTPATIENT)
Facility: HOSPITAL | Age: 75
Discharge: HOME | End: 2025-06-01
Attending: STUDENT IN AN ORGANIZED HEALTH CARE EDUCATION/TRAINING PROGRAM | Admitting: STUDENT IN AN ORGANIZED HEALTH CARE EDUCATION/TRAINING PROGRAM
Payer: MEDICARE

## 2025-05-31 VITALS
HEIGHT: 69 IN | SYSTOLIC BLOOD PRESSURE: 117 MMHG | TEMPERATURE: 98 F | HEART RATE: 69 BPM | OXYGEN SATURATION: 95 % | BODY MASS INDEX: 29.62 KG/M2 | WEIGHT: 200 LBS | RESPIRATION RATE: 18 BRPM | DIASTOLIC BLOOD PRESSURE: 60 MMHG

## 2025-05-31 DIAGNOSIS — R07.9 CHEST PAIN: ICD-10-CM

## 2025-05-31 DIAGNOSIS — R04.0 EPISTAXIS: ICD-10-CM

## 2025-05-31 PROBLEM — N17.9 ACUTE KIDNEY INJURY: Status: ACTIVE | Noted: 2025-05-31

## 2025-05-31 PROBLEM — S02.2XXA CLOSED FRACTURE OF NASAL BONE: Status: ACTIVE | Noted: 2025-05-31

## 2025-05-31 PROBLEM — D62 ACUTE BLOOD LOSS ANEMIA: Status: ACTIVE | Noted: 2025-05-31

## 2025-05-31 LAB
ABORH RETYPE: NORMAL
ABSOLUTE EOSINOPHIL (OHS): 0.06 K/UL
ABSOLUTE EOSINOPHIL (OHS): 0.11 K/UL
ABSOLUTE EOSINOPHIL (OHS): 0.16 K/UL
ABSOLUTE MONOCYTE (OHS): 0.49 K/UL (ref 0.3–1)
ABSOLUTE MONOCYTE (OHS): 0.54 K/UL (ref 0.3–1)
ABSOLUTE MONOCYTE (OHS): 0.6 K/UL (ref 0.3–1)
ABSOLUTE NEUTROPHIL COUNT (OHS): 3.06 K/UL (ref 1.8–7.7)
ABSOLUTE NEUTROPHIL COUNT (OHS): 3.1 K/UL (ref 1.8–7.7)
ABSOLUTE NEUTROPHIL COUNT (OHS): 5.31 K/UL (ref 1.8–7.7)
ALBUMIN SERPL BCP-MCNC: 4.6 G/DL (ref 3.5–5.2)
ALP SERPL-CCNC: 98 UNIT/L (ref 40–150)
ALT SERPL W/O P-5'-P-CCNC: 20 UNIT/L (ref 10–44)
ANION GAP (OHS): 13 MMOL/L (ref 8–16)
ANION GAP (OHS): 7 MMOL/L (ref 8–16)
AST SERPL-CCNC: 23 UNIT/L (ref 11–45)
BASOPHILS # BLD AUTO: 0.02 K/UL
BASOPHILS NFR BLD AUTO: 0.3 %
BASOPHILS NFR BLD AUTO: 0.4 %
BASOPHILS NFR BLD AUTO: 0.4 %
BILIRUB SERPL-MCNC: 0.4 MG/DL (ref 0.1–1)
BUN SERPL-MCNC: 33 MG/DL (ref 8–23)
BUN SERPL-MCNC: 44 MG/DL (ref 8–23)
CALCIUM SERPL-MCNC: 8.2 MG/DL (ref 8.7–10.5)
CALCIUM SERPL-MCNC: 9.5 MG/DL (ref 8.7–10.5)
CHLORIDE SERPL-SCNC: 108 MMOL/L (ref 95–110)
CHLORIDE SERPL-SCNC: 109 MMOL/L (ref 95–110)
CO2 SERPL-SCNC: 16 MMOL/L (ref 23–29)
CO2 SERPL-SCNC: 20 MMOL/L (ref 23–29)
CREAT SERPL-MCNC: 1.7 MG/DL (ref 0.5–1.4)
CREAT SERPL-MCNC: 1.9 MG/DL (ref 0.5–1.4)
ERYTHROCYTE [DISTWIDTH] IN BLOOD BY AUTOMATED COUNT: 15.9 % (ref 11.5–14.5)
ERYTHROCYTE [DISTWIDTH] IN BLOOD BY AUTOMATED COUNT: 16 % (ref 11.5–14.5)
ERYTHROCYTE [DISTWIDTH] IN BLOOD BY AUTOMATED COUNT: 16 % (ref 11.5–14.5)
GFR SERPLBLD CREATININE-BSD FMLA CKD-EPI: 27 ML/MIN/1.73/M2
GFR SERPLBLD CREATININE-BSD FMLA CKD-EPI: 31 ML/MIN/1.73/M2
GLUCOSE SERPL-MCNC: 111 MG/DL (ref 70–110)
GLUCOSE SERPL-MCNC: 91 MG/DL (ref 70–110)
HCT VFR BLD AUTO: 30.7 % (ref 37–48.5)
HCT VFR BLD AUTO: 33.3 % (ref 37–48.5)
HCT VFR BLD AUTO: 36.3 % (ref 37–48.5)
HGB BLD-MCNC: 10.4 GM/DL (ref 12–16)
HGB BLD-MCNC: 11.7 GM/DL (ref 12–16)
HGB BLD-MCNC: 9.6 GM/DL (ref 12–16)
IMM GRANULOCYTES # BLD AUTO: 0.02 K/UL (ref 0–0.04)
IMM GRANULOCYTES NFR BLD AUTO: 0.3 % (ref 0–0.5)
IMM GRANULOCYTES NFR BLD AUTO: 0.4 % (ref 0–0.5)
IMM GRANULOCYTES NFR BLD AUTO: 0.4 % (ref 0–0.5)
INDIRECT COOMBS: NORMAL
LYMPHOCYTES # BLD AUTO: 1.02 K/UL (ref 1–4.8)
LYMPHOCYTES # BLD AUTO: 1.16 K/UL (ref 1–4.8)
LYMPHOCYTES # BLD AUTO: 1.19 K/UL (ref 1–4.8)
MCH RBC QN AUTO: 28.3 PG (ref 27–31)
MCH RBC QN AUTO: 28.8 PG (ref 27–31)
MCH RBC QN AUTO: 29.1 PG (ref 27–31)
MCHC RBC AUTO-ENTMCNC: 31.2 G/DL (ref 32–36)
MCHC RBC AUTO-ENTMCNC: 31.3 G/DL (ref 32–36)
MCHC RBC AUTO-ENTMCNC: 32.2 G/DL (ref 32–36)
MCV RBC AUTO: 90 FL (ref 82–98)
MCV RBC AUTO: 91 FL (ref 82–98)
MCV RBC AUTO: 92 FL (ref 82–98)
NUCLEATED RBC (/100WBC) (OHS): 0 /100 WBC
PLATELET # BLD AUTO: 224 K/UL (ref 150–450)
PLATELET # BLD AUTO: 267 K/UL (ref 150–450)
PLATELET # BLD AUTO: 268 K/UL (ref 150–450)
PMV BLD AUTO: 10 FL (ref 9.2–12.9)
PMV BLD AUTO: 9.6 FL (ref 9.2–12.9)
PMV BLD AUTO: 9.8 FL (ref 9.2–12.9)
POTASSIUM SERPL-SCNC: 4.9 MMOL/L (ref 3.5–5.1)
POTASSIUM SERPL-SCNC: 5 MMOL/L (ref 3.5–5.1)
PROT SERPL-MCNC: 9.4 GM/DL (ref 6–8.4)
RBC # BLD AUTO: 3.33 M/UL (ref 4–5.4)
RBC # BLD AUTO: 3.68 M/UL (ref 4–5.4)
RBC # BLD AUTO: 4.02 M/UL (ref 4–5.4)
RELATIVE EOSINOPHIL (OHS): 0.9 %
RELATIVE EOSINOPHIL (OHS): 2.2 %
RELATIVE EOSINOPHIL (OHS): 3.2 %
RELATIVE LYMPHOCYTE (OHS): 14.7 % (ref 18–48)
RELATIVE LYMPHOCYTE (OHS): 23.4 % (ref 18–48)
RELATIVE LYMPHOCYTE (OHS): 23.6 % (ref 18–48)
RELATIVE MONOCYTE (OHS): 10.9 % (ref 4–15)
RELATIVE MONOCYTE (OHS): 11.9 % (ref 4–15)
RELATIVE MONOCYTE (OHS): 7.1 % (ref 4–15)
RELATIVE NEUTROPHIL (OHS): 61.5 % (ref 38–73)
RELATIVE NEUTROPHIL (OHS): 61.7 % (ref 38–73)
RELATIVE NEUTROPHIL (OHS): 76.7 % (ref 38–73)
RH BLD: NORMAL
SODIUM SERPL-SCNC: 135 MMOL/L (ref 136–145)
SODIUM SERPL-SCNC: 138 MMOL/L (ref 136–145)
SPECIMEN OUTDATE: NORMAL
WBC # BLD AUTO: 4.96 K/UL (ref 3.9–12.7)
WBC # BLD AUTO: 5.04 K/UL (ref 3.9–12.7)
WBC # BLD AUTO: 6.92 K/UL (ref 3.9–12.7)

## 2025-05-31 PROCEDURE — 99203 OFFICE O/P NEW LOW 30 MIN: CPT | Mod: 25,,, | Performed by: OTOLARYNGOLOGY

## 2025-05-31 PROCEDURE — 25000003 PHARM REV CODE 250

## 2025-05-31 PROCEDURE — G0378 HOSPITAL OBSERVATION PER HR: HCPCS

## 2025-05-31 PROCEDURE — 63600175 PHARM REV CODE 636 W HCPCS: Performed by: EMERGENCY MEDICINE

## 2025-05-31 PROCEDURE — 36415 COLL VENOUS BLD VENIPUNCTURE: CPT

## 2025-05-31 PROCEDURE — 63600175 PHARM REV CODE 636 W HCPCS: Performed by: STUDENT IN AN ORGANIZED HEALTH CARE EDUCATION/TRAINING PROGRAM

## 2025-05-31 PROCEDURE — 94761 N-INVAS EAR/PLS OXIMETRY MLT: CPT

## 2025-05-31 PROCEDURE — 25000003 PHARM REV CODE 250: Performed by: STUDENT IN AN ORGANIZED HEALTH CARE EDUCATION/TRAINING PROGRAM

## 2025-05-31 PROCEDURE — 27000221 HC OXYGEN, UP TO 24 HOURS

## 2025-05-31 PROCEDURE — 96374 THER/PROPH/DIAG INJ IV PUSH: CPT

## 2025-05-31 PROCEDURE — 36415 COLL VENOUS BLD VENIPUNCTURE: CPT | Performed by: STUDENT IN AN ORGANIZED HEALTH CARE EDUCATION/TRAINING PROGRAM

## 2025-05-31 PROCEDURE — 85025 COMPLETE CBC W/AUTO DIFF WBC: CPT | Performed by: STUDENT IN AN ORGANIZED HEALTH CARE EDUCATION/TRAINING PROGRAM

## 2025-05-31 PROCEDURE — 30901 CONTROL OF NOSEBLEED: CPT | Mod: 50

## 2025-05-31 PROCEDURE — 94799 UNLISTED PULMONARY SVC/PX: CPT

## 2025-05-31 PROCEDURE — 80048 BASIC METABOLIC PNL TOTAL CA: CPT

## 2025-05-31 PROCEDURE — 85025 COMPLETE CBC W/AUTO DIFF WBC: CPT | Mod: 91

## 2025-05-31 PROCEDURE — 30901 CONTROL OF NOSEBLEED: CPT | Mod: LT,,, | Performed by: OTOLARYNGOLOGY

## 2025-05-31 PROCEDURE — 86901 BLOOD TYPING SEROLOGIC RH(D): CPT

## 2025-05-31 PROCEDURE — 99900035 HC TECH TIME PER 15 MIN (STAT)

## 2025-05-31 RX ORDER — ONDANSETRON HYDROCHLORIDE 2 MG/ML
4 INJECTION, SOLUTION INTRAVENOUS
Status: COMPLETED | OUTPATIENT
Start: 2025-05-31 | End: 2025-05-31

## 2025-05-31 RX ORDER — MORPHINE SULFATE 2 MG/ML
2 INJECTION, SOLUTION INTRAMUSCULAR; INTRAVENOUS
Refills: 0 | Status: COMPLETED | OUTPATIENT
Start: 2025-05-31 | End: 2025-05-31

## 2025-05-31 RX ORDER — ALUMINUM HYDROXIDE, MAGNESIUM HYDROXIDE, AND SIMETHICONE 1200; 120; 1200 MG/30ML; MG/30ML; MG/30ML
30 SUSPENSION ORAL 4 TIMES DAILY PRN
Status: DISCONTINUED | OUTPATIENT
Start: 2025-05-31 | End: 2025-06-01 | Stop reason: HOSPADM

## 2025-05-31 RX ORDER — OXYMETAZOLINE HCL 0.05 %
2 SPRAY, NON-AEROSOL (ML) NASAL EVERY 8 HOURS
Status: DISCONTINUED | OUTPATIENT
Start: 2025-05-31 | End: 2025-06-01 | Stop reason: HOSPADM

## 2025-05-31 RX ORDER — SIMETHICONE 80 MG
1 TABLET,CHEWABLE ORAL 4 TIMES DAILY PRN
Status: DISCONTINUED | OUTPATIENT
Start: 2025-05-31 | End: 2025-06-01 | Stop reason: HOSPADM

## 2025-05-31 RX ORDER — ACETAMINOPHEN 500 MG
1000 TABLET ORAL EVERY 8 HOURS PRN
Status: DISCONTINUED | OUTPATIENT
Start: 2025-05-31 | End: 2025-06-01 | Stop reason: HOSPADM

## 2025-05-31 RX ORDER — LEVOTHYROXINE SODIUM 75 UG/1
75 TABLET ORAL
Status: DISCONTINUED | OUTPATIENT
Start: 2025-06-01 | End: 2025-06-01 | Stop reason: HOSPADM

## 2025-05-31 RX ORDER — SODIUM CHLORIDE 0.9 % (FLUSH) 0.9 %
5 SYRINGE (ML) INJECTION
Status: DISCONTINUED | OUTPATIENT
Start: 2025-05-31 | End: 2025-06-01 | Stop reason: HOSPADM

## 2025-05-31 RX ORDER — ATORVASTATIN CALCIUM 40 MG/1
80 TABLET, FILM COATED ORAL DAILY
Status: DISCONTINUED | OUTPATIENT
Start: 2025-05-31 | End: 2025-06-01 | Stop reason: HOSPADM

## 2025-05-31 RX ORDER — TALC
6 POWDER (GRAM) TOPICAL NIGHTLY PRN
Status: DISCONTINUED | OUTPATIENT
Start: 2025-05-31 | End: 2025-05-31

## 2025-05-31 RX ORDER — ONDANSETRON 8 MG/1
8 TABLET, ORALLY DISINTEGRATING ORAL EVERY 8 HOURS PRN
Status: DISCONTINUED | OUTPATIENT
Start: 2025-05-31 | End: 2025-06-01 | Stop reason: HOSPADM

## 2025-05-31 RX ORDER — NALOXONE HCL 0.4 MG/ML
0.02 VIAL (ML) INJECTION
Status: DISCONTINUED | OUTPATIENT
Start: 2025-05-31 | End: 2025-06-01 | Stop reason: HOSPADM

## 2025-05-31 RX ORDER — POLYETHYLENE GLYCOL 3350 17 G/17G
17 POWDER, FOR SOLUTION ORAL 2 TIMES DAILY PRN
Status: DISCONTINUED | OUTPATIENT
Start: 2025-05-31 | End: 2025-06-01 | Stop reason: HOSPADM

## 2025-05-31 RX ORDER — ZOLPIDEM TARTRATE 5 MG/1
5 TABLET ORAL NIGHTLY PRN
Status: DISCONTINUED | OUTPATIENT
Start: 2025-05-31 | End: 2025-05-31

## 2025-05-31 RX ORDER — AMIODARONE HYDROCHLORIDE 200 MG/1
200 TABLET ORAL DAILY
Status: DISCONTINUED | OUTPATIENT
Start: 2025-05-31 | End: 2025-06-01 | Stop reason: HOSPADM

## 2025-05-31 RX ORDER — PROCHLORPERAZINE EDISYLATE 5 MG/ML
5 INJECTION INTRAMUSCULAR; INTRAVENOUS EVERY 6 HOURS PRN
Status: DISCONTINUED | OUTPATIENT
Start: 2025-05-31 | End: 2025-06-01 | Stop reason: HOSPADM

## 2025-05-31 RX ORDER — ESZOPICLONE 3 MG/1
3 TABLET, FILM COATED ORAL NIGHTLY
Status: DISCONTINUED | OUTPATIENT
Start: 2025-05-31 | End: 2025-06-01 | Stop reason: HOSPADM

## 2025-05-31 RX ORDER — IPRATROPIUM BROMIDE AND ALBUTEROL SULFATE 2.5; .5 MG/3ML; MG/3ML
3 SOLUTION RESPIRATORY (INHALATION) EVERY 4 HOURS PRN
Status: DISCONTINUED | OUTPATIENT
Start: 2025-05-31 | End: 2025-06-01 | Stop reason: HOSPADM

## 2025-05-31 RX ORDER — AMOXICILLIN AND CLAVULANATE POTASSIUM 875; 125 MG/1; MG/1
1 TABLET, FILM COATED ORAL EVERY 12 HOURS
Status: DISCONTINUED | OUTPATIENT
Start: 2025-05-31 | End: 2025-06-01 | Stop reason: HOSPADM

## 2025-05-31 RX ORDER — ACETAMINOPHEN 325 MG/1
650 TABLET ORAL EVERY 4 HOURS PRN
Status: DISCONTINUED | OUTPATIENT
Start: 2025-05-31 | End: 2025-06-01 | Stop reason: HOSPADM

## 2025-05-31 RX ADMIN — BACITRACIN ZINC, NEOMYCIN, POLYMYXIN B 1 EACH: 400; 3.5; 5 OINTMENT TOPICAL at 01:05

## 2025-05-31 RX ADMIN — Medication 2 SPRAY: at 10:05

## 2025-05-31 RX ADMIN — NASAL 1 SPRAY: 6.5 SPRAY NASAL at 05:05

## 2025-05-31 RX ADMIN — Medication: at 12:05

## 2025-05-31 RX ADMIN — AMIODARONE HYDROCHLORIDE 200 MG: 200 TABLET ORAL at 10:05

## 2025-05-31 RX ADMIN — MORPHINE SULFATE 2 MG: 2 INJECTION, SOLUTION INTRAMUSCULAR; INTRAVENOUS at 08:05

## 2025-05-31 RX ADMIN — ACETAMINOPHEN 1000 MG: 500 TABLET ORAL at 10:05

## 2025-05-31 RX ADMIN — AMOXICILLIN AND CLAVULANATE POTASSIUM 1 TABLET: 875; 125 TABLET, FILM COATED ORAL at 09:05

## 2025-05-31 RX ADMIN — ONDANSETRON 8 MG: 8 TABLET, ORALLY DISINTEGRATING ORAL at 07:05

## 2025-05-31 RX ADMIN — NASAL 1 SPRAY: 6.5 SPRAY NASAL at 10:05

## 2025-05-31 RX ADMIN — ATORVASTATIN CALCIUM 80 MG: 40 TABLET, FILM COATED ORAL at 10:05

## 2025-05-31 RX ADMIN — ESZOPICLONE 3 MG: 3 TABLET, FILM COATED ORAL at 09:05

## 2025-05-31 RX ADMIN — ONDANSETRON 4 MG: 2 INJECTION INTRAMUSCULAR; INTRAVENOUS at 12:05

## 2025-05-31 RX ADMIN — AMOXICILLIN AND CLAVULANATE POTASSIUM 1 TABLET: 875; 125 TABLET, FILM COATED ORAL at 10:05

## 2025-05-31 RX ADMIN — ACETAMINOPHEN 1000 MG: 500 TABLET ORAL at 06:05

## 2025-05-31 RX ADMIN — SODIUM CHLORIDE, POTASSIUM CHLORIDE, SODIUM LACTATE AND CALCIUM CHLORIDE 1000 ML: 600; 310; 30; 20 INJECTION, SOLUTION INTRAVENOUS at 03:05

## 2025-05-31 NOTE — ED NOTES
Per Dr. Mc, patient needs CT Scan before further interventions can be completed to rule out fracture. Patient agreeable with same; [facility] notified of patient ready for scan.

## 2025-05-31 NOTE — ED NOTES
Transfer form signed by patient; transport arrival to take patient to [facility]; no voiced concerns regarding same when asked. No incident with transfer to EMS stretcher.

## 2025-05-31 NOTE — ED PROVIDER NOTES
Encounter Date: 2025       History     Chief Complaint   Patient presents with    Epistaxis     Nosebleed x 2 hours.  Pt is on eliquis. Pt having a lot of passing of clots.  Stated she feels very cold.  Pt fell Saturday after tripping and landed on her face Saturday.  Bruising noted to nose and bilateral eyes.      74 y.o. female with AFib on Eliquis, hypothyroidism, HTN presents for nosebleed.  Patient reports 2 hours of bleeding from the nose.  She is currently on Eliquis.  She reports passing clots from her nose.  Patient reports last week she tripped and fell onto her face.  She denies any symptoms following the fall but was never evaluated.  She denied presenting numbness, weakness,    The history is provided by the patient and medical records.     Review of patient's allergies indicates:   Allergen Reactions    Cefazolin      Other reaction(s): Extreme pain left leg and right arm  Other reaction(s): Extreme pain left leg and right arm     Past Medical History:   Diagnosis Date    Anticoagulant long-term use     Encounter for monitoring flecainide therapy 2022    Hypertension     Hypothyroidism, unspecified     Malignant neoplasm of tongue     Paroxysmal atrial fibrillation     Radiation-induced esophageal stricture      Past Surgical History:   Procedure Laterality Date     egd   once per month at [facility]      ADENOIDECTOMY  1968    BREAST LUMPECTOMY Right 2013    CARDIOVERSION       SECTION  ,    FRACTURE SURGERY  2014    TONSILLECTOMY  1968    TUBAL LIGATION       Family History   Problem Relation Name Age of Onset    Alzheimer's disease Mother Claudean Esperanza     Heart disease Mother Claudean Britt     Cancer Father Brain Mata     Cancer Sister Mikaela Navas      Social History[1]  Review of Systems   Reason unable to perform ROS: See HPI for relevant ROS.       Physical Exam     Initial Vitals [25 2214]   BP Pulse Resp Temp SpO2   (!) 186/79 89 (!) 24 98.1 °F (36.7 °C)  97 %      MAP       --         Physical Exam    Nursing note and vitals reviewed.  Constitutional:   Alert, normal work of breathing, uncomfortable appearing   HENT:   Scattered ecchymosis of the face, active nosebleed of the left naris   Eyes: Conjunctivae and EOM are normal. Pupils are equal, round, and reactive to light. No scleral icterus.   Cardiovascular:  Normal rate, regular rhythm and intact distal pulses.           Pulmonary/Chest: Breath sounds normal. No stridor. No respiratory distress.   Abdominal: Abdomen is soft. She exhibits no distension. There is no abdominal tenderness.   Musculoskeletal:      Comments: No tenderness, step-offs, deformities, or tenderness to the C, T, or L-spine  Normal range of motion of all extremities without pain  No bony tenderness or deformity of the trunk or extremities     Neurological: She is alert.   Skin: Skin is warm and dry.         ED Course   Epistaxis Mgmt    Date/Time: 5/31/2025 4:44 AM    Performed by: Erick Mc MD  Authorized by: Erick Mc MD  Anesthesia: see MAR for details  Treatment site: left anterior and right anterior  Repair method: merocel sponge and oxymetazoline  Post-procedure assessment: bleeding decreased  Treatment complexity: complex  Patient tolerance: Patient tolerated the procedure well with no immediate complications        Labs Reviewed   COMPREHENSIVE METABOLIC PANEL - Abnormal       Result Value    Sodium 138      Potassium 4.9      Chloride 109      CO2 16 (*)     Glucose 111 (*)     BUN 33 (*)     Creatinine 1.9 (*)     Calcium 9.5      Protein Total 9.4 (*)     Albumin 4.6      Bilirubin Total 0.4      ALP 98      AST 23      ALT 20      Anion Gap 13      eGFR 27 (*)    CBC WITH DIFFERENTIAL - Abnormal    WBC 7.98      RBC 4.73      HGB 14.1      HCT 42.3      MCV 89      MCH 29.8      MCHC 33.3      RDW 15.9 (*)     Platelet Count 308      MPV 9.7      Nucleated RBC 0      Neut % 72.2      Lymph % 18.3      Mono % 6.9       Eos % 1.8      Basophil % 0.4      Imm Grans % 0.4      Neut # 5.77      Lymph # 1.46      Mono # 0.55      Eos # 0.14      Baso # 0.03      Imm Grans # 0.03     CBC WITH DIFFERENTIAL - Abnormal    WBC 6.92      RBC 4.02      HGB 11.7 (*)     HCT 36.3 (*)     MCV 90      MCH 29.1      MCHC 32.2      RDW 15.9 (*)     Platelet Count 267      MPV 9.6      Nucleated RBC 0      Neut % 76.7 (*)     Lymph % 14.7 (*)     Mono % 7.1      Eos % 0.9      Basophil % 0.3      Imm Grans % 0.3      Neut # 5.31      Lymph # 1.02      Mono # 0.49      Eos # 0.06      Baso # 0.02      Imm Grans # 0.02     CBC W/ AUTO DIFFERENTIAL    Narrative:     The following orders were created for panel order CBC auto differential.  Procedure                               Abnormality         Status                     ---------                               -----------         ------                     CBC with Differential[[id number]]       Abnormal            Final result                 Please view results for these tests on the individual orders.   CBC W/ AUTO DIFFERENTIAL    Narrative:     The following orders were created for panel order CBC auto differential.  Procedure                               Abnormality         Status                     ---------                               -----------         ------                     CBC with Differential[[id number]]       Abnormal            Final result                 Please view results for these tests on the individual orders.          Imaging Results              CT Maxillofacial Without Contrast (Final result)  Result time 05/31/25 00:41:03      Final result by Kuldeep Delacruz DO (05/31/25 00:41:03)                   Impression:      Acute-appearing nondisplaced bilateral nasal bone fractures and a fracture of the nasal septum.  Recommend correlation with point tenderness.    Paranasal sinus hemorrhage and nasopharyngeal hemorrhage.      Electronically signed by: Kuldeep  Nubia  Date:    05/31/2025  Time:    00:41               Narrative:    EXAMINATION:  CT MAXILLOFACIAL WITHOUT CONTRAST    CLINICAL HISTORY:  Facial trauma, blunt;    TECHNIQUE:  Low dose axial images, sagittal and coronal reformations were obtained through the face without intravenous contrast.    COMPARISON:  None available.    FINDINGS:  There are acute appearing nondisplaced bilateral nasal fractures and a fracture of the nasal septum.  The nasal septum approximates midline.    The mandible is intact and not dislocated. There are degenerative changes of the TMJs bilaterally. there is scattered dental amalgam, resulting in streak artifact.    The orbits are unremarkable. The bilateral globes are symmetric and intact. There is no preseptal or post-septal fluid or hemorrhage.    There is mild hemorrhage layering in the left frontal sinus, the left ethmoid sinus, the left sphenoid sinus, and the left maxillary sinus.  Mild mucosal thickening or hemorrhage in the right maxillary sinus.  The mastoid air cells are clear.  There is hemorrhage within the bilateral nasal passageways and in the nasopharynx.    The soft tissues of the face are unremarkable.  There is packing material within the left nare.    Partially imaged portions of the cervical spine are intact.                                       CT Head Without Contrast (Final result)  Result time 05/31/25 00:28:55      Final result by Kuldeep Delacruz DO (05/31/25 00:28:55)                   Impression:      No acute intracranial abnormality.    Age-indeterminate bilateral nasal bone fractures, recommend correlation with point tenderness.    Paranasal sinus and nasopharyngeal hemorrhage.    Chronic microvascular ischemic changes.      Electronically signed by: Kuldeep Delacruz  Date:    05/31/2025  Time:    00:28               Narrative:    EXAMINATION:  CT HEAD WITHOUT CONTRAST    CLINICAL HISTORY:  Head trauma, minor (Age >= 65y);    TECHNIQUE:  Low dose axial CT  images obtained throughout the head without intravenous contrast. Sagittal and coronal reconstructions were performed.    COMPARISON:  None available.    FINDINGS:  Ventricles and sulci are normal in size for age without evidence of hydrocephalus. No extra-axial blood or fluid collections.  There are chronic microvascular ischemic changes.  No parenchymal mass, hemorrhage, edema or major vascular distribution infarct.    There are bilateral nasal bone fractures, age indeterminate.  There is mild hemorrhage in the left frontal, the left ethmoid, the left maxillary, and the left sphenoid sinuses.  There is hemorrhage within the bilateral nasal passageways and the nasopharynx.  No calvarial fracture.  The scalp is unremarkable.                                       Medications   oxymetazoline 0.05 % nasal spray 1 spray (1 spray Each Nostril Given 5/30/25 2245)   tranexamic acid injection Soln 1,000 mg (1,000 mg Nasal Given 5/30/25 2245)   LETS (LIDOcaine-TETRAcaine-EPINEPHrine) gel solution ( Topical (Top) Given 5/31/25 0045)   neomycin-bacitracnZn-polymyxnB packet 1 each (1 each Topical (Top) Given 5/31/25 0100)   ondansetron injection 4 mg (4 mg Intravenous Given 5/31/25 0053)     Medical Decision Making  74 y.o. female with AFib on Eliquis, hypothyroidism, HTN presents for nosebleed  Differentials include anterior epistaxis, posterior epistaxis, acute blood loss anemia, facial bone fracture, nasal bone fracture, TBI  Patient presenting after fall that occurred earlier in the week and now with nose bleeding out of the left naris.  Hemodynamically stable, GCS 15, no focal neurological deficits.  No external evidence of injury to the trunk or extremities but does have some bruising around the eyes and face, CT head was ordered to evaluate further, patient found to have nasal bone fractures.  Merocel was placed with improvement in bleeding but continues to have some mild oozing.  Patient remained hemodynamically stable  and maintained her airway throughout ED stay.  She did have a small drop in her hemoglobin over time    Amount and/or Complexity of Data Reviewed  External Data Reviewed: labs and notes.  Labs: ordered. Decision-making details documented in ED Course.  Radiology: ordered. Decision-making details documented in ED Course.    Risk  OTC drugs.  Prescription drug management.               ED Course as of 256   Sat May 31, 2025   0036 CT Head Without Contrast  Nasal bone fractures, no intracranial injuries or skull base fracture [OK]   0049 Hemoglobin: 14.1 [OK]   0218 Discussed with ENT who accept patient for ED to ED transfer [OK]   0331 BP: 117/60 [OK]      ED Course User Index  [OK] Erick Mc MD                           Clinical Impression:  Final diagnoses:  [R04.0] Right-sided epistaxis (Primary)  [S02.2XXA] Closed fracture of nasal bone, initial encounter  [W19.XXXA] Fall, initial encounter          ED Disposition Condition    Transfer to Another Facility Stable                      [1]   Social History  Tobacco Use    Smoking status: Former     Current packs/day: 0.00     Average packs/day: 1 pack/day for 15.0 years (15.0 ttl pk-yrs)     Types: Cigarettes     Quit date: 2005     Years since quittin.5    Smokeless tobacco: Never   Substance Use Topics    Alcohol use: Yes     Alcohol/week: 5.0 standard drinks of alcohol     Types: 5 Drinks containing 0.5 oz of alcohol per week    Drug use: Not Currently     Types: Marijuana        Erick Mc MD  25 [id number]

## 2025-05-31 NOTE — ED NOTES
Report called to DINESH Rascon at [facility]. All questions answered when asked. Awaiting transport.

## 2025-06-01 VITALS
BODY MASS INDEX: 31.21 KG/M2 | SYSTOLIC BLOOD PRESSURE: 128 MMHG | TEMPERATURE: 98 F | WEIGHT: 210.69 LBS | DIASTOLIC BLOOD PRESSURE: 64 MMHG | RESPIRATION RATE: 18 BRPM | OXYGEN SATURATION: 90 % | HEART RATE: 69 BPM | HEIGHT: 69 IN

## 2025-06-01 LAB
ANION GAP (OHS): 9 MMOL/L (ref 8–16)
BUN SERPL-MCNC: 40 MG/DL (ref 8–23)
CALCIUM SERPL-MCNC: 8.6 MG/DL (ref 8.7–10.5)
CHLORIDE SERPL-SCNC: 109 MMOL/L (ref 95–110)
CO2 SERPL-SCNC: 18 MMOL/L (ref 23–29)
CREAT SERPL-MCNC: 1.7 MG/DL (ref 0.5–1.4)
ERYTHROCYTE [DISTWIDTH] IN BLOOD BY AUTOMATED COUNT: 16.2 % (ref 11.5–14.5)
GFR SERPLBLD CREATININE-BSD FMLA CKD-EPI: 31 ML/MIN/1.73/M2
GLUCOSE SERPL-MCNC: 95 MG/DL (ref 70–110)
HCT VFR BLD AUTO: 32.5 % (ref 37–48.5)
HGB BLD-MCNC: 10.3 GM/DL (ref 12–16)
MAGNESIUM SERPL-MCNC: 1.7 MG/DL (ref 1.6–2.6)
MCH RBC QN AUTO: 29.3 PG (ref 27–31)
MCHC RBC AUTO-ENTMCNC: 31.7 G/DL (ref 32–36)
MCV RBC AUTO: 93 FL (ref 82–98)
PLATELET # BLD AUTO: 235 K/UL (ref 150–450)
PMV BLD AUTO: 9.9 FL (ref 9.2–12.9)
POTASSIUM SERPL-SCNC: 4.6 MMOL/L (ref 3.5–5.1)
RBC # BLD AUTO: 3.51 M/UL (ref 4–5.4)
SODIUM SERPL-SCNC: 136 MMOL/L (ref 136–145)
WBC # BLD AUTO: 4.7 K/UL (ref 3.9–12.7)

## 2025-06-01 PROCEDURE — 36415 COLL VENOUS BLD VENIPUNCTURE: CPT

## 2025-06-01 PROCEDURE — 25000003 PHARM REV CODE 250

## 2025-06-01 PROCEDURE — G0378 HOSPITAL OBSERVATION PER HR: HCPCS

## 2025-06-01 RX ORDER — AMOXICILLIN AND CLAVULANATE POTASSIUM 875; 125 MG/1; MG/1
1 TABLET, FILM COATED ORAL EVERY 12 HOURS
Qty: 7 TABLET | Refills: 0 | Status: SHIPPED | OUTPATIENT
Start: 2025-06-01 | End: 2025-06-05

## 2025-06-01 RX ADMIN — LEVOTHYROXINE SODIUM 75 MCG: 75 TABLET ORAL at 05:06

## 2025-06-01 RX ADMIN — AMOXICILLIN AND CLAVULANATE POTASSIUM 1 TABLET: 875; 125 TABLET, FILM COATED ORAL at 08:06

## 2025-06-01 RX ADMIN — ATORVASTATIN CALCIUM 80 MG: 40 TABLET, FILM COATED ORAL at 08:06

## 2025-06-01 RX ADMIN — NASAL 1 SPRAY: 6.5 SPRAY NASAL at 06:06

## 2025-06-01 RX ADMIN — ACETAMINOPHEN 650 MG: 325 TABLET ORAL at 02:06

## 2025-06-01 RX ADMIN — AMIODARONE HYDROCHLORIDE 200 MG: 200 TABLET ORAL at 08:06

## 2025-06-02 ENCOUNTER — NURSE TRIAGE (OUTPATIENT)
Dept: ADMINISTRATIVE | Facility: CLINIC | Age: 75
End: 2025-06-02
Payer: MEDICARE

## 2025-06-03 ENCOUNTER — PATIENT MESSAGE (OUTPATIENT)
Dept: PRIMARY CARE CLINIC | Facility: CLINIC | Age: 75
End: 2025-06-03
Payer: MEDICARE

## 2025-06-03 ENCOUNTER — PATIENT OUTREACH (OUTPATIENT)
Dept: ADMINISTRATIVE | Facility: CLINIC | Age: 75
End: 2025-06-03
Payer: MEDICARE

## 2025-06-03 ENCOUNTER — OFFICE VISIT (OUTPATIENT)
Dept: OTOLARYNGOLOGY | Facility: CLINIC | Age: 75
End: 2025-06-03
Payer: MEDICARE

## 2025-06-03 DIAGNOSIS — R04.0 EPISTAXIS: Primary | ICD-10-CM

## 2025-06-03 DIAGNOSIS — S02.2XXD CLOSED FRACTURE OF NASAL BONE WITH ROUTINE HEALING, SUBSEQUENT ENCOUNTER: ICD-10-CM

## 2025-06-03 PROCEDURE — 1126F AMNT PAIN NOTED NONE PRSNT: CPT | Mod: CPTII,S$GLB,, | Performed by: PHYSICIAN ASSISTANT

## 2025-06-03 PROCEDURE — 99999 PR PBB SHADOW E&M-EST. PATIENT-LVL III: CPT | Mod: PBBFAC,,, | Performed by: PHYSICIAN ASSISTANT

## 2025-06-03 PROCEDURE — 4010F ACE/ARB THERAPY RXD/TAKEN: CPT | Mod: CPTII,S$GLB,, | Performed by: PHYSICIAN ASSISTANT

## 2025-06-03 PROCEDURE — 99213 OFFICE O/P EST LOW 20 MIN: CPT | Mod: 25,S$GLB,, | Performed by: PHYSICIAN ASSISTANT

## 2025-06-03 PROCEDURE — 1160F RVW MEDS BY RX/DR IN RCRD: CPT | Mod: CPTII,S$GLB,, | Performed by: PHYSICIAN ASSISTANT

## 2025-06-03 PROCEDURE — 1159F MED LIST DOCD IN RCRD: CPT | Mod: CPTII,S$GLB,, | Performed by: PHYSICIAN ASSISTANT

## 2025-06-03 PROCEDURE — 31238 NSL/SINS NDSC SRG NSL HEMRRG: CPT | Mod: LT,S$GLB,, | Performed by: PHYSICIAN ASSISTANT

## 2025-07-03 ENCOUNTER — HOSPITAL ENCOUNTER (OUTPATIENT)
Dept: RADIOLOGY | Facility: HOSPITAL | Age: 75
Discharge: HOME | End: 2025-07-03
Payer: MEDICARE

## 2025-07-03 ENCOUNTER — OFFICE VISIT (OUTPATIENT)
Facility: CLINIC | Age: 75
End: 2025-07-03
Payer: MEDICARE

## 2025-07-03 VITALS
SYSTOLIC BLOOD PRESSURE: 174 MMHG | WEIGHT: 197.75 LBS | BODY MASS INDEX: 29.29 KG/M2 | DIASTOLIC BLOOD PRESSURE: 107 MMHG | HEART RATE: 73 BPM | HEIGHT: 69 IN

## 2025-07-03 DIAGNOSIS — M25.551 RIGHT HIP PAIN: ICD-10-CM

## 2025-07-03 DIAGNOSIS — M25.551 PAIN OF RIGHT HIP: ICD-10-CM

## 2025-07-03 DIAGNOSIS — M87.051 AVASCULAR NECROSIS OF BONE OF RIGHT HIP: Primary | ICD-10-CM

## 2025-07-03 DIAGNOSIS — M85.861 OTHER SPECIFIED DISORDERS OF BONE DENSITY AND STRUCTURE, RIGHT LOWER LEG: ICD-10-CM

## 2025-07-03 DIAGNOSIS — E55.9 VITAMIN D DEFICIENCY: ICD-10-CM

## 2025-07-03 PROCEDURE — 1101F PT FALLS ASSESS-DOCD LE1/YR: CPT | Mod: CPTII,S$GLB,,

## 2025-07-03 PROCEDURE — 73502 X-RAY EXAM HIP UNI 2-3 VIEWS: CPT | Mod: 26,RT,, | Performed by: RADIOLOGY

## 2025-07-03 PROCEDURE — 1125F AMNT PAIN NOTED PAIN PRSNT: CPT | Mod: CPTII,S$GLB,,

## 2025-07-03 PROCEDURE — 3288F FALL RISK ASSESSMENT DOCD: CPT | Mod: CPTII,S$GLB,,

## 2025-07-03 PROCEDURE — 73502 X-RAY EXAM HIP UNI 2-3 VIEWS: CPT | Mod: TC,RT

## 2025-07-03 PROCEDURE — 3077F SYST BP >= 140 MM HG: CPT | Mod: CPTII,S$GLB,,

## 2025-07-03 PROCEDURE — 99999 PR PBB SHADOW E&M-EST. PATIENT-LVL V: CPT | Mod: PBBFAC,,,

## 2025-07-03 PROCEDURE — 1159F MED LIST DOCD IN RCRD: CPT | Mod: CPTII,S$GLB,,

## 2025-07-03 PROCEDURE — 3008F BODY MASS INDEX DOCD: CPT | Mod: CPTII,S$GLB,,

## 2025-07-03 PROCEDURE — 3080F DIAST BP >= 90 MM HG: CPT | Mod: CPTII,S$GLB,,

## 2025-07-03 PROCEDURE — 4010F ACE/ARB THERAPY RXD/TAKEN: CPT | Mod: CPTII,S$GLB,,

## 2025-07-03 PROCEDURE — 99204 OFFICE O/P NEW MOD 45 MIN: CPT | Mod: S$GLB,,,

## 2025-07-03 RX ORDER — DICLOFENAC SODIUM 10 MG/G
2 GEL TOPICAL 3 TIMES DAILY
Qty: 20 G | Refills: 0 | Status: CANCELLED | OUTPATIENT
Start: 2025-07-03

## 2025-07-03 RX ORDER — TRAMADOL HYDROCHLORIDE 50 MG/1
50 TABLET, FILM COATED ORAL EVERY 6 HOURS PRN
Qty: 28 TABLET | Refills: 0 | Status: SHIPPED | OUTPATIENT
Start: 2025-07-03 | End: 2025-07-10

## 2025-07-03 RX ORDER — DICLOFENAC SODIUM 10 MG/G
2 GEL TOPICAL 4 TIMES DAILY
Qty: 20 G | Refills: 0 | Status: CANCELLED | OUTPATIENT
Start: 2025-07-03

## 2025-07-03 NOTE — PROGRESS NOTES
Subjective:      Patient ID: Juliana Andersen is a 74 y.o. female.    Chief Complaint: Pain of the Right Hip and Pain    HPI:   Juliana presents with right hip pain that started in October of 2024. Pain is deep in the inner hip/groin area, not painful to touch, but noticeable with certain movements. Pain has improved but remains present and bothersome. Pain severity varies between 5 or 6 out of 10 on some days, while on others, like today, it is less severe. Pain is not constant but can be triggered by activities such as bending over to pick something up from the floor, causing her to slowly stand back up to avoid discomfort.     She reports difficulty walking. Sleeping on her left side worsens pain, making it difficult to walk upon waking, while sleeping on her right side is less problematic. Tylenol helps manage pain and a topical gel was ineffective. She has a history of atrial fibrillation (A-fib) which limits her ability to use certain anti-inflammatory medications. She expresses concern about taking Tylenol daily.    She denies pain radiating to her back or down her legs. She denies any history of hip dislocation or fracture. The pain is becoming progressively worse. She reports that the pain is a 4 /10. Patient describes aching, nonradiating, and pain with movement today. The pain is affecting ADLs and limiting desired level of activity. Denies numbness, tingling, radiation and inability to bear weight.. Denies recent fall or injury.     Trial of tylenol with some improvement.     Occupation: retired    Ambulating: unassisted  Diabetic:  No  Smoking:  former  History of DVT/PE: Negative    PAST MEDICAL HISTORY:    Past Medical History:   Diagnosis Date    Anemia 2026    Anticoagulant long-term use     Bleeding disorder 2025    Disorder of kidney and ureter 2025    Encounter for monitoring flecainide therapy 05/23/2022    Facial trauma May 2026    Hearing loss 2022    Hypertension 2017    Hypothyroidism,  unspecified     Malignant neoplasm of tongue     Obesity     Osteoporosis     Paroxysmal atrial fibrillation     Radiation 2006    Radiation-induced esophageal stricture     Seasonal allergies 1996     PAST SURGICAL HISTORY:    Past Surgical History:   Procedure Laterality Date     egd   once per month at [facility]      ADENOIDECTOMY  1968    BREAST LUMPECTOMY Right 2013    CARDIOVERSION       SECTION  ,    FRACTURE SURGERY  2014    TONSILLECTOMY  1968    TUBAL LIGATION       FAMILY HISTORY:    Family History   Problem Relation Name Age of Onset    Alzheimer's disease Mother Claudean Britt     Heart disease Mother Claudean Britt     Cancer Father Brain Mata     Cancer Sister Mikaela Navas      SOCIAL HISTORY:    Social History     Occupational History    Not on file   Tobacco Use    Smoking status: Former     Current packs/day: 0.00     Average packs/day: 1 pack/day for 15.0 years (15.0 ttl pk-yrs)     Types: Cigarettes     Quit date: 2005     Years since quittin.6    Smokeless tobacco: Never   Substance and Sexual Activity    Alcohol use: Yes     Alcohol/week: 5.0 standard drinks of alcohol     Types: 5 Drinks containing 0.5 oz of alcohol per week    Drug use: Not Currently     Types: Marijuana    Sexual activity: Not Currently     Birth control/protection: None      MEDICATIONS: Current Medications[1]  ALLERGIES:   Review of patient's allergies indicates:   Allergen Reactions    Cefazolin      Other reaction(s): Extreme pain left leg and right arm  Other reaction(s): Extreme pain left leg and right arm     Review of Systems:  Constitution: Negative for chills, fever and night sweats.   HENT: Negative for congestion and headaches.    Eyes: Negative for blurred vision or vision loss.  Cardiovascular: Negative for chest pain and syncope.   Respiratory: Negative for cough and shortness of breath.    Endocrine: Negative for polydipsia, polyphagia and polyuria.   Hematologic/Lymphatic:  Negative for bleeding problem. Does not bruise/bleed easily.   Skin: Negative for dry skin, itching and rash.   Musculoskeletal: See HPI.   Gastrointestinal: Negative for abdominal pain and bowel incontinence.   Genitourinary: Negative for bladder incontinence and nocturia.   Neurological: Negative for disturbances in coordination, loss of balance and seizures.   Psychiatric/Behavioral: Negative for depression. The patient does not have insomnia.    Allergic/Immunologic: Negative for hives and persistent infections.        Objective:      Vitals:    07/03/25 1253   BP: (!) 174/107   Pulse: 73     PHYSICAL EXAM:  General: Alert & oriented x3, well-developed and well-nourished, in no acute distress, sitting comfortably in the exam room.  Skin: Warm and dry. Capillary refill less than 2 seconds.   Head: Normocephalic and atraumatic.   Eyes: Sclera appear normal.   Nose: No deformities seen.   Ears: No deformities seen.   Neck: No tracheal deviation present.   Pulmonary/Chest: Breathing unlabored.   Neurological: Alert and oriented to person, place, and time.   Psychiatric: Mood is pleasant and affect appropriate.     RIGHT HIP EXAMINATION     OBSERVATION / INSPECTION  Gait:   Antalgic   Alignment:  Neutral   Scars:   None   Muscle atrophy: None   Effusion:  None   Warmth:  None   Leg lengths:   Equal     TENDERNESS       Trochanteric bursa   -   Piriformis    -   SI joint    -   Psoas tendon   -   Rectus insertion  -   Adductor insertion  -   Pubic symphysis  -     ROM: (* = pain)    Flexion:    120 degrees  External rotation: 40 degrees  Internal rotation without axial load: 30 degrees*  Abduction:  35 degrees  Adduction:   20 degrees    SPECIAL TESTS:  Pain w/ forced internal rotation (FADIR): +   Pain w/ forced external rotation (TRUNG): Negative   Log roll:      Negative   Snapping hip (internal):   Negative   Sit-up pain:     +      EXTREMITY NEURO-VASCULAR EXAMINATION:   Sensation:  Grossly intact to light  touch all dermatomal regions.   Motor Function:  Fully intact motor function at hip, knee, foot and ankle      No clonus and downgoing Babinski.    Vascular status:  DP and PT pulses 2+, brisk capillary refill, symmetric.    Skin: intact, compartments soft.    Imagin view right Hip X-rays ordered/reviewed by me showing no evidence of acute fracture or dislocation. Sclerosis noted to right femoral head. There is no obvious malalignment. No evidence of masses, lesions or foreign bodies.       Assessment:      1. Avascular necrosis of bone of right hip  Ambulatory Referral/Consult to Physical Therapy    Ambulatory referral/consult to Orthopedics    MRI Hip Without Contrast Right    traMADoL (ULTRAM) 50 mg tablet    Ambulatory referral/consult to Orthopedics    DXA Bone Density Axial Skeleton 1 or more sites      2. Right hip pain  X-Ray Hip 2 or 3 views Right with Pelvis when performed    Ambulatory Referral/Consult to Physical Therapy    Ambulatory referral/consult to Orthopedics    MRI Hip Without Contrast Right    traMADoL (ULTRAM) 50 mg tablet    Ambulatory referral/consult to Orthopedics    DXA Bone Density Axial Skeleton 1 or more sites      3. Pain of right hip        4. Vitamin D deficiency  DXA Bone Density Axial Skeleton 1 or more sites      5. Other specified disorders of bone density and structure, right lower leg  DXA Bone Density Axial Skeleton 1 or more sites         Plan:      I made the decision to obtain old records of the patient including previous notes and imaging. New imaging was ordered today of the extremity or extremities evaluated. I independently reviewed and interpreted the radiographs today as well as prior imaging. Reviewed imaging in detail with patient.     I explained the nature of the problem to the patient.     I discussed at length with the patient all the different treatment options available for her right hip including acetaminophen, rest, ice, heat, and physical therapy. I  explained the potential role of surgery in the treatment of this condition. The patient understands that if non-surgical measures do not adequately control symptoms, surgery will be considered in the future.     Medications:  Prescription for tramadol provided today for PRN pain management. Advised patient to refrain from taking anti-inflammatory medications due to history of kidney disease and A. Fib with long term anti-coagulant use, however she may alternate with acetaminophen as needed.  Physical Therapy:  Ambulatory referral to physical therapy for right hip ROM, stretching, strengthening, and conditioning.  Imaging: Ordered MRI right hip to evaluate potential AVN - scheduled 7/5/25. Ordered DEXA scan to evaluate bone density - scheduled 7/7/25.  Referrals placed to general orthopedics for AVN follow up and MRI review. Referral placed to Yuri Sherman PA-C for osteoporosis clinic evaluation.          Follow-Up: Referral placed with Orthopedics for follow-up.     All of the patient's questions were answered and the patient will contact us if they have any questions or concerns in the interim.    CHERELLE Denton  [facility]    This note was generated with the assistance of ambient listening technology. Verbal consent was obtained by the patient and accompanying visitor(s) for the recording of patient appointment to facilitate this note. I attest to having reviewed and edited the generated note for accuracy, though some syntax or spelling errors may persist. Please contact the author of this note for any clarification.       [1]   Current Outpatient Medications:     amiodarone (PACERONE) 200 MG Tab, Take 1 tablet (200 mg total) by mouth once daily., Disp: 90 tablet, Rfl: 1    ELIQUIS 5 mg Tab, Take 1 tablet (5 mg total) by mouth 2 (two) times daily., Disp: 180 tablet, Rfl: 4    eszopiclone (LUNESTA) 3 mg Tab, Take 1 tablet (3 mg total) by mouth every evening., Disp: 90  tablet, Rfl: 1    levothyroxine (SYNTHROID) 75 MCG tablet, TAKE ONE TABLET BY MOUTH EVERY DAY BEFORE BREAKFAST, Disp: 90 tablet, Rfl: 1    rosuvastatin (CRESTOR) 20 MG tablet, Take 1 tablet (20 mg total) by mouth every evening., Disp: 90 tablet, Rfl: 3    [Paused] amLODIPine (NORVASC) 10 MG tablet, TAKE ONE TABLET BY MOUTH EVERY EVENING (Patient not taking: Reported on 7/3/2025), Disp: 90 tablet, Rfl: 3    [Paused] irbesartan (AVAPRO) 150 MG tablet, TAKE ONE TABLET BY MOUTH EVERY EVENING (Patient not taking: Reported on 7/3/2025), Disp: 90 tablet, Rfl: 3    traMADoL (ULTRAM) 50 mg tablet, Take 1 tablet (50 mg total) by mouth every 6 (six) hours as needed for Pain (for severe pain)., Disp: 28 tablet, Rfl: 0

## 2025-07-05 ENCOUNTER — HOSPITAL ENCOUNTER (OUTPATIENT)
Dept: RADIOLOGY | Facility: HOSPITAL | Age: 75
Discharge: HOME | End: 2025-07-05
Payer: MEDICARE

## 2025-07-05 DIAGNOSIS — M87.051 AVASCULAR NECROSIS OF BONE OF RIGHT HIP: ICD-10-CM

## 2025-07-05 DIAGNOSIS — M25.551 RIGHT HIP PAIN: ICD-10-CM

## 2025-07-05 PROCEDURE — 73721 MRI JNT OF LWR EXTRE W/O DYE: CPT | Mod: TC,RT

## 2025-07-05 PROCEDURE — 73721 MRI JNT OF LWR EXTRE W/O DYE: CPT | Mod: 26,RT,, | Performed by: RADIOLOGY

## 2025-07-07 ENCOUNTER — HOSPITAL ENCOUNTER (OUTPATIENT)
Dept: RADIOLOGY | Facility: HOSPITAL | Age: 75
Discharge: HOME | End: 2025-07-07
Payer: MEDICARE

## 2025-07-07 DIAGNOSIS — M87.051 AVASCULAR NECROSIS OF BONE OF RIGHT HIP: ICD-10-CM

## 2025-07-07 DIAGNOSIS — M85.861 OTHER SPECIFIED DISORDERS OF BONE DENSITY AND STRUCTURE, RIGHT LOWER LEG: ICD-10-CM

## 2025-07-07 DIAGNOSIS — M25.551 RIGHT HIP PAIN: ICD-10-CM

## 2025-07-07 DIAGNOSIS — E55.9 VITAMIN D DEFICIENCY: ICD-10-CM

## 2025-07-07 PROCEDURE — 77080 DXA BONE DENSITY AXIAL: CPT | Mod: 26,,, | Performed by: RADIOLOGY

## 2025-07-07 PROCEDURE — 77080 DXA BONE DENSITY AXIAL: CPT | Mod: TC

## 2025-07-08 ENCOUNTER — RESULTS FOLLOW-UP (OUTPATIENT)
Facility: CLINIC | Age: 75
End: 2025-07-08
Payer: MEDICARE

## 2025-07-08 DIAGNOSIS — M87.051 AVASCULAR NECROSIS OF BONE OF RIGHT HIP: Primary | ICD-10-CM

## 2025-07-22 ENCOUNTER — OFFICE VISIT (OUTPATIENT)
Dept: ORTHOPEDICS | Facility: CLINIC | Age: 75
End: 2025-07-22
Payer: MEDICARE

## 2025-07-22 ENCOUNTER — LAB VISIT (OUTPATIENT)
Dept: LAB | Facility: HOSPITAL | Age: 75
End: 2025-07-22
Payer: MEDICARE

## 2025-07-22 VITALS — HEIGHT: 69 IN | WEIGHT: 198.88 LBS | BODY MASS INDEX: 29.46 KG/M2

## 2025-07-22 DIAGNOSIS — M81.0 OSTEOPOROSIS, UNSPECIFIED OSTEOPOROSIS TYPE, UNSPECIFIED PATHOLOGICAL FRACTURE PRESENCE: Primary | ICD-10-CM

## 2025-07-22 DIAGNOSIS — M81.0 OSTEOPOROSIS, UNSPECIFIED OSTEOPOROSIS TYPE, UNSPECIFIED PATHOLOGICAL FRACTURE PRESENCE: ICD-10-CM

## 2025-07-22 DIAGNOSIS — M25.551 RIGHT HIP PAIN: ICD-10-CM

## 2025-07-22 DIAGNOSIS — M81.6 LOCALIZED OSTEOPOROSIS OF LEQUESNE: ICD-10-CM

## 2025-07-22 DIAGNOSIS — M87.051 AVASCULAR NECROSIS OF BONE OF RIGHT HIP: ICD-10-CM

## 2025-07-22 LAB
ALBUMIN SERPL BCP-MCNC: 4.6 G/DL (ref 3.5–5.2)
ALP SERPL-CCNC: 94 UNIT/L (ref 40–150)
ALT SERPL W/O P-5'-P-CCNC: 19 UNIT/L (ref 10–44)
ANION GAP (OHS): 11 MMOL/L (ref 8–16)
AST SERPL-CCNC: 22 UNIT/L (ref 11–45)
BILIRUB SERPL-MCNC: 0.5 MG/DL (ref 0.1–1)
BUN SERPL-MCNC: 18 MG/DL (ref 8–23)
CALCIUM SERPL-MCNC: 9.5 MG/DL (ref 8.7–10.5)
CHLORIDE SERPL-SCNC: 103 MMOL/L (ref 95–110)
CO2 SERPL-SCNC: 22 MMOL/L (ref 23–29)
CREAT SERPL-MCNC: 1.7 MG/DL (ref 0.5–1.4)
GFR SERPLBLD CREATININE-BSD FMLA CKD-EPI: 31 ML/MIN/1.73/M2
GLUCOSE SERPL-MCNC: 94 MG/DL (ref 70–110)
POTASSIUM SERPL-SCNC: 4.5 MMOL/L (ref 3.5–5.1)
PROT SERPL-MCNC: 8.6 GM/DL (ref 6–8.4)
PTH-INTACT SERPL-MCNC: 230.7 PG/ML (ref 9–77)
SODIUM SERPL-SCNC: 136 MMOL/L (ref 136–145)
T4 FREE SERPL-MCNC: 1.36 NG/DL (ref 0.71–1.51)
TSH SERPL-ACNC: 2.62 UIU/ML (ref 0.4–4)

## 2025-07-22 PROCEDURE — 99214 OFFICE O/P EST MOD 30 MIN: CPT | Mod: S$GLB,,, | Performed by: PHYSICIAN ASSISTANT

## 2025-07-22 PROCEDURE — 84439 ASSAY OF FREE THYROXINE: CPT

## 2025-07-22 PROCEDURE — 4010F ACE/ARB THERAPY RXD/TAKEN: CPT | Mod: CPTII,S$GLB,, | Performed by: PHYSICIAN ASSISTANT

## 2025-07-22 PROCEDURE — 84443 ASSAY THYROID STIM HORMONE: CPT

## 2025-07-22 PROCEDURE — 36415 COLL VENOUS BLD VENIPUNCTURE: CPT

## 2025-07-22 PROCEDURE — 84075 ASSAY ALKALINE PHOSPHATASE: CPT | Mod: 59

## 2025-07-22 PROCEDURE — 99999 PR PBB SHADOW E&M-EST. PATIENT-LVL III: CPT | Mod: PBBFAC,,, | Performed by: PHYSICIAN ASSISTANT

## 2025-07-22 PROCEDURE — 1160F RVW MEDS BY RX/DR IN RCRD: CPT | Mod: CPTII,S$GLB,, | Performed by: PHYSICIAN ASSISTANT

## 2025-07-22 PROCEDURE — 84075 ASSAY ALKALINE PHOSPHATASE: CPT

## 2025-07-22 PROCEDURE — 3008F BODY MASS INDEX DOCD: CPT | Mod: CPTII,S$GLB,, | Performed by: PHYSICIAN ASSISTANT

## 2025-07-22 PROCEDURE — 1159F MED LIST DOCD IN RCRD: CPT | Mod: CPTII,S$GLB,, | Performed by: PHYSICIAN ASSISTANT

## 2025-07-22 PROCEDURE — 83970 ASSAY OF PARATHORMONE: CPT

## 2025-07-23 PROBLEM — R29.898 IMPAIRED STRENGTH OF LOWER EXTREMITY: Status: ACTIVE | Noted: 2025-07-23

## 2025-07-23 PROBLEM — R26.89 IMPAIRED GAIT AND MOBILITY: Status: ACTIVE | Noted: 2025-07-23

## 2025-07-23 PROBLEM — M25.651 IMPAIRED RANGE OF MOTION OF RIGHT HIP: Status: ACTIVE | Noted: 2025-07-23

## 2025-07-24 ENCOUNTER — OFFICE VISIT (OUTPATIENT)
Dept: ORTHOPEDICS | Facility: CLINIC | Age: 75
End: 2025-07-24
Payer: MEDICARE

## 2025-07-24 VITALS — BODY MASS INDEX: 29.39 KG/M2 | WEIGHT: 198.44 LBS | HEIGHT: 69 IN

## 2025-07-24 DIAGNOSIS — M87.051 AVASCULAR NECROSIS OF BONE OF RIGHT HIP: Primary | ICD-10-CM

## 2025-07-24 DIAGNOSIS — I48.0 PAROXYSMAL ATRIAL FIBRILLATION: ICD-10-CM

## 2025-07-24 DIAGNOSIS — N18.31 STAGE 3A CHRONIC KIDNEY DISEASE: ICD-10-CM

## 2025-07-24 PROCEDURE — 3008F BODY MASS INDEX DOCD: CPT | Mod: CPTII,S$GLB,,

## 2025-07-24 PROCEDURE — 4010F ACE/ARB THERAPY RXD/TAKEN: CPT | Mod: CPTII,S$GLB,,

## 2025-07-24 PROCEDURE — 1160F RVW MEDS BY RX/DR IN RCRD: CPT | Mod: CPTII,S$GLB,,

## 2025-07-24 PROCEDURE — 1159F MED LIST DOCD IN RCRD: CPT | Mod: CPTII,S$GLB,,

## 2025-07-24 PROCEDURE — 1125F AMNT PAIN NOTED PAIN PRSNT: CPT | Mod: CPTII,S$GLB,,

## 2025-07-24 PROCEDURE — 99999 PR PBB SHADOW E&M-EST. PATIENT-LVL III: CPT | Mod: PBBFAC,,,

## 2025-07-24 PROCEDURE — 99214 OFFICE O/P EST MOD 30 MIN: CPT | Mod: S$GLB,,,

## 2025-07-24 NOTE — PROGRESS NOTES
SUBJECTIVE:     Chief Complaint & History of Present Illness:  uJliana Andersen is a new patient 74 y.o. female who is seen here today for a bone health evaluation for osteoporosis, fracture prevention, and risk evaluation for future fractures.   History of Present Illness    - Osteoporosis and avascular necrosis of the hip.    - Presents to the clinic for evaluation of avascular necrosis in her hip and osteoporosis  - Initially unaware of the reason for her referral, figured it out from reading materials  - Reports previous diagnosis of osteopenia, not osteoporosis  - Has swallowing problem due to previous cancer at the base of her tongue, affecting ability to take medications  - Requires crushing all medicines due to swallowing issue  - Diet has changed since 's passing about a year ago  - Reports decreased cooking at home and increased consumption of restaurant meals  - Activity level has decreased since last October  - Previously traveled in an [place] , providing regular exercise  - Mentions having trouble with one hip, though scan shows issues with both  - Reports having atrial fibrillation (AFib), currently controlled with medication  - Had cancer a while ago  - Denies any recent cancer recurrence          she was appropriately identified and referred by Rita Cerna due to concerns for compromised bone quality, and risk of future fractures.  This visit is medically necessary to identify risk, investigate and initiative treatment as appropriate to improve bone quality and strength for the reduction of secondary fractures.     her medical history, medications and fracture history will be reviewed and summarized      Review of patient's allergies indicates:   Allergen Reactions    Cefazolin      Other reaction(s): Extreme pain left leg and right arm  Other reaction(s): Extreme pain left leg and right arm         Current Medications[1]    Past Medical  "History:   Diagnosis Date    Anemia     Anticoagulant long-term use     Bleeding disorder     Disorder of kidney and ureter     Encounter for monitoring flecainide therapy 2022    Facial trauma May 2026    Hearing loss     Hypertension 2017    Hypothyroidism, unspecified     Malignant neoplasm of tongue     Obesity     Osteoporosis     Paroxysmal atrial fibrillation     Radiation 2006    Radiation-induced esophageal stricture     Seasonal allergies        Past Surgical History:   Procedure Laterality Date     egd   once per month at [facility]      ADENOIDECTOMY  1968    BREAST LUMPECTOMY Right 2013    CARDIOVERSION       SECTION  ,    FRACTURE SURGERY  2014    TONSILLECTOMY  1968    TUBAL LIGATION         Lab Results   Component Value Date     2025    K 4.5 2025     2025    CO2 22 (L) 2025    GLU 94 2025    BUN 18 2025    CREATININE 1.7 (H) 2025    CALCIUM 9.5 2025    PROT 8.6 (H) 2025    ALBUMIN 4.6 2025    BILITOT 0.5 2025    ALKPHOS 94 2025    AST 22 2025    ALT 19 2025    ANIONGAP 11 2025    ESTGFRAFRICA 52.9 (A) 2021    EGFRNONAA 45.9 (A) 2021      Lab Results   Component Value Date    WBC 4.70 2025    RBC 3.51 (L) 2025    HGB 10.3 (L) 2025    HCT 32.5 (L) 2025    MCV 93 2025    MCH 29.3 2025    MCHC 31.7 (L) 2025    RDW 16.2 (H) 2025     2025    MPV 9.9 2025    GRAN 3.5 2023    GRAN 57.1 2023    LYMPH 23.4 2025    LYMPH 1.16 2025    MONO 10.9 2025    MONO 0.54 2025    EOS 3.2 2025    EOS 0.16 2025    BASO 0.04 2023    EOSINOPHIL 4.2 2023    BASOPHIL 0.4 2025    BASOPHIL 0.02 2025    DIFFMETHOD Automated 2023      Lab Results   Component Value Date    MG 1.7 2025      No results found for: "PHOS"   Lab " "Results   Component Value Date    QDGIIHJI82RS 10 (L) 05/15/2025      Lab Results   Component Value Date    .7 (H) 07/22/2025      Lab Results   Component Value Date    TSH 2.618 07/22/2025      Lab Results   Component Value Date    FREET4 1.36 07/22/2025      No results found for: "HGBA1C", "ESTIMATEDAVG"   No results found for: "FREETESTOSTE"         Vital Signs (Most Recent)  There were no vitals filed for this visit.      Today's Visit and Bone Health History  Question 7/22/2025  2:22 PM CDT - Filed by Arlin Skelton MA   Have you had any loss of height or gotten shorter since your 20's? 0   Are you postmenopausal? Yes   Please indicate how menopause occured. Naturally   Please indicate at what age you became postmenopausal. 55   Have you ever taken any type of hormone replacement therapy? No   Do you currently smoke? No   Have you ever smoked in the past? Yes   If yes, how many years? 35 yrs.   How many alcoholic beverages do you drink per day? 1 to 3   How many caffeinated beverages do you drink per day? 0   Have you had 2 or more falls in the past 12 months? No   How active have you been in the past 12 months? Somewhat active ( walk up to 1 mile per day )   Did either of your parents have a hip fracture after the age of 50? No   Have you ever been diagnosed with any of the following? Vitamin D Deficiency    Hyperparathyroidism   Do you currently have a fracture? No   Have you broken any other bones since you turned 50 or older? Yes   Please list all bones broken since you turned 50 or older. elbow  /  nose   Have you ever had a bone density test or DXA scan? Yes   Are you currently taking or have you ever taken any of the following medications? None   Do you take Calcium? Yes   If you take Calcium what dose? n/a   Do you take Vitamin D? Yes   If you take Vitamin D what dose? n/a   Have you ever been diagnosed with cancer? Yes   Please indicate what type of cancer and when you were diagnosed. base of " "tongue 2006 / breast 2014   Have you ever been treated for cancer with high beam radiation or had radioactive implants? Yes   If you have been treated for cancer with high beam radiation or had radioactive implants please indicate what type. n/a          she has medical history and medication use known to be associated with bone loss and osteoporosis to include avascular necrosis of the hip osteoporosis by DEXA scan.     The last DXA was performed in 07/07/2025.          T-Score Hip: -3.3     T-Score Spine: -0.9      FRAX:      Major Fx.: 23%         Hip Fx.: 9.9%      Review of Systems:  ROS:  Patient Active Problem List    Diagnosis Date Noted    Impaired range of motion of right hip 07/23/2025    Impaired strength of lower extremity 07/23/2025    Impaired gait and mobility 07/23/2025    Epistaxis 05/31/2025    Closed fracture of nasal bone 05/31/2025    Acute blood loss anemia 05/31/2025    Acute kidney injury 05/31/2025    Persistent atrial fibrillation 07/05/2023    Stage 3a chronic kidney disease 06/17/2023    RBBB 06/17/2023    Long term current use of anticoagulant 05/02/2022     Taking eliquis for stroke prophylaxis      Pure hypercholesterolemia 05/02/2022    Primary hypertension 03/12/2021    Left ventricular hypertrophy 03/12/2021    Mitral valve prolapse 03/12/2021    Osteoporosis 03/12/2021    Paroxysmal atrial fibrillation 03/12/2021    Subclinical hypothyroidism 03/12/2021    Long-term current use of high risk medication other than anticoagulant 03/12/2021    Anticoagulation management encounter 03/12/2021    Carotid stenosis, asymptomatic, bilateral 03/12/2021    Radiation-induced esophageal stricture 03/28/2017     Located level of upper esophageal sphincter      Insomnia due to medical condition 12/22/2015         OBJECTIVE:     PHYSICAL EXAM:  Height: 5' 9" (175.3 cm) Weight: 90.2 kg (198 lb 13.7 oz),                  General: no acute distress  Behavioral/Psych: normal judgment and " insight  Skin: no rash  Head/Neck: atraumatic, normocephalic, without obvious abnormality  Respiratory: normal respiratory effort  Cardiac: regular rate and rhythm  Vascular: pulses present  Abdomen: soft, non-tender  Musculoskeletal: no joint tenderness, deformity or swelling               ASSESSMENT/PLAN:     Assessment:    Osteoporosis, at high risk for future fractures, .    Plan:   30-45 minutes spent in face-to-face consultation with patient and her family members today, discussing the disease management of osteoporosis, for the reduction of future fractures.  I have explained that bone strength is equal to bone quality. A bone density / DEXA scan is important to complement her care since her fracture was by definition a fragility fracture/traumatic fracture.  Over half of the encounter was spent (50%) counseling the patient on the disease of osteoporosis evidence-based and best practice treatment options available as well as recommendations for improvement of bone quality, the importance of nutritional supplements to include:  Calcium is preferred that the patient maintain her calcium level through her diet.  If supplements are required 1815-8765 mg daily in divided doses   Vitamin D3  8453-2871 units daily in divided doses.   Fall prevention and personal safety for the reduction of future fractures.    Clinicians Guidelines for the treatment of Osteoporosis 2024 as part of the National Osteoporosis Foundation were utilized as the evidence-based information provided.    Discussed pharmaceutical treatment options to include Biphosphatases, Denosumab, Abaloparatide and Teriparatide. Information to include indications for therapy, risks and benefits to treatment, and important safety information related to these treatments was provided and discussed.  Handouts were given to the patient for her review on osteoporosis and other pharmacological treatment information related to other available treatment  options.    The importance of diet, impact exercise, and core strengthening with gait and balance exercise, through  both formal physical therapy programs and home exercise programs was discussed.     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    PROCEDURES:  - Discussed potential hip replacement due to avascular necrosis.  - Emphasized benefits of planning elective surgery rather than emergency procedure.  - Ms. DANUTA العراقي expressed hesitation about hip replacement.  - Ordered bone density scan to assess osteoporosis severity and determine appropriate treatment.    FOLLOW UP:  - Follow up in about a week to review lab results and decide on treatment plan.  - Contact the office after reviewing osteoporosis treatment information provided.    PATIENT INSTRUCTIONS:  - Review provided list of calcium-rich foods and incorporate into diet.                [1]   Current Outpatient Medications   Medication Sig Dispense Refill    amiodarone (PACERONE) 200 MG Tab Take 1 tablet (200 mg total) by mouth once daily. 90 tablet 1    [Paused] amLODIPine (NORVASC) 10 MG tablet TAKE ONE TABLET BY MOUTH EVERY EVENING 90 tablet 3    ELIQUIS 5 mg Tab Take 1 tablet (5 mg total) by mouth 2 (two) times daily. 180 tablet 4    eszopiclone (LUNESTA) 3 mg Tab Take 1 tablet (3 mg total) by mouth every evening. 90 tablet 1    [Paused] irbesartan (AVAPRO) 150 MG tablet TAKE ONE TABLET BY MOUTH EVERY EVENING 90 tablet 3    levothyroxine (SYNTHROID) 75 MCG tablet TAKE ONE  TABLET BY MOUTH EVERY DAY BEFORE BREAKFAST 90 tablet 1    rosuvastatin (CRESTOR) 20 MG tablet Take 1 tablet (20 mg total) by mouth every evening. 90 tablet 3     No current facility-administered medications for this visit.

## 2025-07-24 NOTE — PROGRESS NOTES
SUBJECTIVE:     History of Present Illness    CHIEF COMPLAINT:  - Right hip pain    HPI:  Ms. DANUTA العراقي presents with right hip pain that started in October of last year. Pain is located in the groin and has been constant since onset. Initially, pain was excruciating, making it difficult to walk. Currently, it is described as a dull ache, primarily occurring when walking, with no pain at rest. Pain has impacted stamina and strength, limiting her ability to walk even short distances.    An orthopedic practitioner initially diagnosed it as tendinitis based on XRs and advised rest, which she followed for about six months. Due to persistent pain, she sought further evaluation at the [facility] about a month ago. An MRI revealed avascular necrosis of the femoral head.    For pain management, she has been taking Tylenol and Tramadol. Tramadol, prescribed for 28 days, has been particularly helpful, especially for sleep. She notes improved comfort when sleeping on the right side, though unsure why.    She recently saw Dr. Sherman for osteoporosis evaluation and received literature on potential medications, considering starting Prolia for treatment.    She denies any weakness, numbness, tingling, or pain shooting down the right leg. She denies any history of steroid injections into the hip, falls, or specific injuries preceding the onset of pain.    PREVIOUS TREATMENTS:  - PT: Recently started    MEDICATIONS:  - Tylenol: As needed for pain, providing some benefit  - Tramadol: At night, helps with sleep and pain management           Past Medical History:   Diagnosis Date    Anemia 2026    Anticoagulant long-term use     Bleeding disorder 2025    Disorder of kidney and ureter 2025    Encounter for monitoring flecainide therapy 05/23/2022    Facial trauma May 2026    Hearing loss 2022    Hypertension 2017    Hypothyroidism, unspecified     Malignant neoplasm of tongue     Obesity 2024    Osteoporosis   "   Paroxysmal atrial fibrillation     Radiation 2006    Radiation-induced esophageal stricture     Seasonal allergies        Past Surgical History:   Procedure Laterality Date     egd   once per month at [facility] [place]      ADENOIDECTOMY  1968    BREAST LUMPECTOMY Right 2013    CARDIOVERSION       SECTION  ,    FRACTURE SURGERY  2014    TONSILLECTOMY  1968    TUBAL LIGATION  1987       Family History   Problem Relation Name Age of Onset    Alzheimer's disease Mother Claudean Britt     Heart disease Mother Claudean Britt     Cancer Father Brain Mata     Cancer Sister Mikaela Navas        Review of patient's allergies indicates:   Allergen Reactions    Cefazolin      Other reaction(s): Extreme pain left leg and right arm  Other reaction(s): Extreme pain left leg and right arm       Current Medications[1]      Review of Systems:  ROS:  The updated medical history is in the chart and has been reviewed. A review of systems is updated and there is no reported vision changes, ear/nose/mouth/throat complaints, chest pain, shortness of breath, abdominal pain, urological complaints, fevers or chills, psychiatric complaints. Musculoskeletal and neurologcial symptoms are as documented. All other systems are negative.      OBJECTIVE:     PHYSICAL EXAM:  Ht 5' 9" (1.753 m)   Wt 90 kg (198 lb 6.6 oz)   BMI 29.30 kg/m²   General: Pleasant, cooperative, NAD.  HEENT: NCAT, sclera nonicteric.  Lungs: Respirations are equal and unlabored.   Abdomen: Soft and non-tender.  CV: 2+ bilateral upper and lower extremity pulses.  Neuro: Sensation intact to light touch.  Skin: Intact throughout LE with no rashes, erythema, or lesions.  Extremities: No LE edema, NVI lower extremities.  Mildly antalgic gait.      right Hip Exam:  TTP: None  90 degrees flexion  10 degrees extension   10 degrees internal rotation  30 degrees external rotation  30 degrees abduction  20 degrees adduction   0 flexion contracture   negative TRUNG "   positive FADIR  positive On license of [facility]      RADIOGRAPHS:  X-rays of the right hip taken on 07/03/2025 personally reviewed. Imaging reveals well-maintained joint space but mixed subchondral lucency and sclerosis of the right femoral head suggestive of possible avascular necrosis.    MRI of the right hip taken on 07/05/2025 personally reviewed. Imaging reveals avascular necrosis of the subchondral femoral head involving less than 50%, superimposed subchondral fracture with the associated articular surface collapse and fragmentation, and mild-to-moderate femoroacetabular osteoarthritis with associated chronic degenerative fraying/tearing of the acetabular labrum.    ASSESSMENT:       ICD-10-CM ICD-9-CM   1. Avascular necrosis of bone of right hip  M87.051 733.42   2. Stage 3a chronic kidney disease  N18.31 585.3   3. Paroxysmal atrial fibrillation  I48.0 427.31       PLAN:     We discussed with the patient at length all the different treatment options available including anti-inflammatories, acetaminophen, rest, ice, lower extremity strengthening exercise, occasional cortisone injections for temporary relief, and finally surgical intervention.      MEDICATIONS:  - Patient unable to take NSAIDs due to decreased GFR and atrial fibrillation.  - Continue taking Tylenol and tramadol as needed for pain.    REFERRALS:  - Spoke to patient about possible referral to pain management if her PCP is unable to refill her tramadol.    PROCEDURES:  - Discussed hip replacement as a potential future treatment option for avascular necrosis.  - Ms. DANUTA العراقي expressed hesitation about undergoing surgery at this time.  - Agreed to defer surgery until patient feels ready or symptoms worsen significantly.    FOLLOW UP:  - Contact the office when ready to proceed with hip replacement.  - Follow up with Dr. Martins as scheduled for osteoporosis treatment.    PATIENT INSTRUCTIONS:  - Maintain strength and range of motion in the hip through home  exercises and physical therapy.          This note was generated with the assistance of ambient listening technology. Verbal consent was obtained by the patient and accompanying visitor(s) for the recording of patient appointment to facilitate this note. I attest to having reviewed and edited the generated note for accuracy, though some syntax or spelling errors may persist. Please contact the author of this note for any clarification.      Moshe Zheng Jr., ERNESTO           [1]   Current Outpatient Medications:     amiodarone (PACERONE) 200 MG Tab, Take 1 tablet (200 mg total) by mouth once daily., Disp: 90 tablet, Rfl: 1    [Paused] amLODIPine (NORVASC) 10 MG tablet, TAKE ONE TABLET BY MOUTH EVERY EVENING, Disp: 90 tablet, Rfl: 3    ELIQUIS 5 mg Tab, Take 1 tablet (5 mg total) by mouth 2 (two) times daily., Disp: 180 tablet, Rfl: 4    eszopiclone (LUNESTA) 3 mg Tab, Take 1 tablet (3 mg total) by mouth every evening., Disp: 90 tablet, Rfl: 1    [Paused] irbesartan (AVAPRO) 150 MG tablet, TAKE ONE TABLET BY MOUTH EVERY EVENING, Disp: 90 tablet, Rfl: 3    levothyroxine (SYNTHROID) 75 MCG tablet, TAKE ONE TABLET BY MOUTH EVERY DAY BEFORE BREAKFAST, Disp: 90 tablet, Rfl: 1    rosuvastatin (CRESTOR) 20 MG tablet, Take 1 tablet (20 mg total) by mouth every evening., Disp: 90 tablet, Rfl: 3

## 2025-07-25 LAB — W ALKALINE PHOSPHATASE, BONE: 13.3 UG/L

## 2025-07-26 DIAGNOSIS — E03.8 SUBCLINICAL HYPOTHYROIDISM: ICD-10-CM

## 2025-07-26 NOTE — TELEPHONE ENCOUNTER
No care due was identified.  [facility] Embedded Care Due Messages. Reference number: [id number].   7/26/2025 12:36:43 PM CDT

## 2025-07-27 RX ORDER — LEVOTHYROXINE SODIUM 75 UG/1
75 TABLET ORAL
Qty: 90 TABLET | Refills: 3 | Status: SHIPPED | OUTPATIENT
Start: 2025-07-27

## 2025-07-28 ENCOUNTER — PATIENT MESSAGE (OUTPATIENT)
Dept: PRIMARY CARE CLINIC | Facility: CLINIC | Age: 75
End: 2025-07-28
Payer: MEDICARE

## 2025-07-28 RX ORDER — AMIODARONE HYDROCHLORIDE 200 MG/1
200 TABLET ORAL
Qty: 90 TABLET | Refills: 1 | Status: SHIPPED | OUTPATIENT
Start: 2025-07-28

## 2025-07-28 NOTE — TELEPHONE ENCOUNTER
"Per ortho note on 7/24/25"Patient unable to take NSAIDs due to decreased GFR and atrial fibrillation.  - Continue taking Tylenol and tramadol as needed for pain.    REFERRALS:  - Spoke to patient about possible referral to pain management if her PCP is unable to refill her tramadol. "  "

## 2025-07-28 NOTE — TELEPHONE ENCOUNTER
Refill Decision Note   Juliana Andersen  is requesting a refill authorization.  Brief Assessment and Rationale for Refill:  Approve     Medication Therapy Plan:         Comments:     Note composed:7:42 PM 07/27/2025

## 2025-07-29 ENCOUNTER — OFFICE VISIT (OUTPATIENT)
Dept: PRIMARY CARE CLINIC | Facility: CLINIC | Age: 75
End: 2025-07-29
Payer: MEDICARE

## 2025-07-29 DIAGNOSIS — M87.051 AVASCULAR NECROSIS OF BONE OF RIGHT HIP: Primary | ICD-10-CM

## 2025-07-29 PROCEDURE — 98005 SYNCH AUDIO-VIDEO EST LOW 20: CPT | Mod: 95,,, | Performed by: INTERNAL MEDICINE

## 2025-07-29 PROCEDURE — 1160F RVW MEDS BY RX/DR IN RCRD: CPT | Mod: CPTII,95,, | Performed by: INTERNAL MEDICINE

## 2025-07-29 PROCEDURE — 4010F ACE/ARB THERAPY RXD/TAKEN: CPT | Mod: CPTII,95,, | Performed by: INTERNAL MEDICINE

## 2025-07-29 PROCEDURE — 1159F MED LIST DOCD IN RCRD: CPT | Mod: CPTII,95,, | Performed by: INTERNAL MEDICINE

## 2025-07-29 RX ORDER — TRAMADOL HYDROCHLORIDE 50 MG/1
50 TABLET, FILM COATED ORAL EVERY 6 HOURS PRN
Qty: 30 TABLET | Refills: 0 | Status: SHIPPED | OUTPATIENT
Start: 2025-07-29

## 2025-07-29 NOTE — PROGRESS NOTES
The patient location is: [place]  The chief complaint leading to consultation is: hip pain    Visit type: audiovisual    Face to Face time with patient: 5 minutes  5 minutes of total time spent on the encounter, which includes face to face time and non-face to face time preparing to see the patient (eg, review of tests), Obtaining and/or reviewing separately obtained history, Documenting clinical information in the electronic or other health record, Independently interpreting results (not separately reported) and communicating results to the patient/family/caregiver, or Care coordination (not separately reported).         Each patient to whom he or she provides medical services by telemedicine is:  (1) informed of the relationship between the physician and patient and the respective role of any other health care provider with respect to management of the patient; and (2) notified that he or she may decline to receive medical services by telemedicine and may withdraw from such care at any time.    [facility] Note    Chief Complaint      Chief Complaint   Patient presents with    right hip pain     History of Present Illness      Juliana Andersen is a 74 y.o. female who presents today for hip pain.  Patient comes to appointment alone.    Seeing ortho for right hip pain.  Dx'ed right hip avascular necrosis via MRI on 7/3/2025. Planning for replacement once pain is no longer tolerable. Doing PT, only 1 session so far. Taking tramadol and tylenol for pain. Very sore after PT.  Pain is worst at night, tramadol helps for this because she can't get comfortable.      Problem List Items Addressed This Visit    None  Visit Diagnoses         Avascular necrosis of bone of right hip    -  Primary    Relevant Medications    traMADoL (ULTRAM) 50 mg tablet            Health Maintenance   Topic Date Due    TETANUS VACCINE  Never done    Mammogram  Never done    Colorectal Cancer Screening  Never done    RSV  Vaccine (Age 60+ and Pregnant patients) (1 - Risk 60-74 years 1-dose series) Never done    COVID-19 Vaccine (8 - - season) 2024    Influenza Vaccine (1) 2025    High Dose Statin  2026    Lipid Panel  2027    DEXA Scan  2027    Hepatitis C Screening  Completed    Shingles Vaccine  Completed    Pneumococcal Vaccines (Age 50+)  Completed       Past Medical History:   Diagnosis Date    Anemia     Anticoagulant long-term use     Bleeding disorder     Disorder of kidney and ureter     Encounter for monitoring flecainide therapy 2022    Facial trauma May 2026    Hearing loss     Hypertension 2017    Hypothyroidism, unspecified     Malignant neoplasm of tongue     Obesity     Osteoporosis     Paroxysmal atrial fibrillation     Radiation     Radiation-induced esophageal stricture     Seasonal allergies        Past Surgical History:   Procedure Laterality Date     egd   once per month at [facility]      ADENOIDECTOMY  1968    BREAST LUMPECTOMY Right 2013    CARDIOVERSION       SECTION  ,    FRACTURE SURGERY      TONSILLECTOMY      TUBAL LIGATION         family history includes Alzheimer's disease in her mother; Cancer in her father and sister; Heart disease in her mother.    Social History[1]    Review of Systems   Constitutional:  Negative for chills and fever.   HENT:  Negative for hearing loss.    Eyes:  Negative for discharge.   Respiratory:  Negative for cough, shortness of breath and wheezing.    Cardiovascular:  Negative for chest pain and palpitations.   Gastrointestinal:  Negative for blood in stool, constipation, diarrhea, nausea and vomiting.   Genitourinary:  Negative for dysuria and hematuria.   Musculoskeletal:  Negative for falls and neck pain.   Neurological:  Negative for weakness and headaches.   Endo/Heme/Allergies:  Negative for polydipsia.        Encounter Medications[2]     Review of patient's allergies  "indicates:   Allergen Reactions    Cefazolin      Other reaction(s): Extreme pain left leg and right arm  Other reaction(s): Extreme pain left leg and right arm       Physical Exam       ]    Physical Exam  Constitutional:       General: She is not in acute distress.     Appearance: She is well-developed.   HENT:      Head: Normocephalic and atraumatic.   Pulmonary:      Effort: Pulmonary effort is normal.   Musculoskeletal:      Cervical back: Normal range of motion.   Skin:     Findings: No rash.   Neurological:      Mental Status: She is alert and oriented to person, place, and time.      Coordination: Coordination normal.   Psychiatric:         Behavior: Behavior normal.         Thought Content: Thought content normal.         Judgment: Judgment normal.          Laboratory:  CBC:  Recent Labs   Lab Result Units 05/31/25  1219 05/31/25  1940 06/01/25  0619   WBC K/uL 5.04 4.96 4.70   RBC M/uL 3.33* 3.68* 3.51*   HGB gm/dL 9.6* 10.4* 10.3*   HCT % 30.7* 33.3* 32.5*   Platelet Count K/uL 224 268 235   MCV fL 92 91 93   MCH pg 28.8 28.3 29.3   MCHC g/dL 31.3* 31.2* 31.7*     CMP:  Recent Labs   Lab Result Units 05/30/25  2343 05/31/25  1219 07/22/25  1530   Glucose mg/dL 111*   < > 94   Calcium mg/dL 9.5   < > 9.5   Albumin g/dL 4.6  --  4.6   Protein Total gm/dL 9.4*  --  8.6*   Sodium mmol/L 138   < > 136   Potassium mmol/L 4.9   < > 4.5   CO2 mmol/L 16*   < > 22*   Chloride mmol/L 109   < > 103   BUN mg/dL 33*   < > 18   ALP unit/L 98  --  94   ALT unit/L 20  --  19   AST unit/L 23  --  22   Bilirubin Total mg/dL 0.4  --  0.5    < > = values in this interval not displayed.     URINALYSIS:  No results for input(s): "COLORU", "CLARITYU", "SPECGRAV", "PHUR", "PROTEINUA", "GLUCOSEU", "BILIRUBINCON", "BLOODU", "WBCU", "RBCU", "BACTERIA", "MUCUS", "NITRITE", "LEUKOCYTESUR", "UROBILINOGEN", "HYALINECASTS" in the last 2160 hours.   LIPIDS:  Recent Labs   Lab Result Units 07/22/25  1530   TSH uIU/mL 2.618     TSH:  Recent " "Labs   Lab Result Units 07/22/25  1530   TSH uIU/mL 2.618     A1C:  No results for input(s): "HGBA1C" in the last 2160 hours.    Radiology:  DXA Bone Density Axial Skeleton 1 or more sites  Result Date: 7/8/2025  EXAMINATION:  DXA BONE DENSITY AXIAL SKELETON 1 OR MORE SITES    CLINICAL HISTORY:  fracture; Pain in right hip    TECHNIQUE:  DXA scanning was performed over the hips and lumbar spine.  Review of the images confirms satisfactory positioning and technique.    COMPARISON:  None    FINDINGS:  The L1 to L4 vertebral bone mineral density is equal to1.085 g/cm squared with a T score of -0.9.    The left femoral neck bone mineral density is equal to 0.585 g/cm squared with a T score of -3.3.    The right femoral neck bone mineral density is equal to 0.639 g/cm squared with a T score of -2.9.    There is a 23.5% risk of a major osteoporotic fracture and a 9.9% risk of hip fracture in the next 10 years (FRAX).        MRI Hip Without Contrast Right  Result Date: 7/6/2025  EXAMINATION:  MRI HIP WITHOUT CONTRAST RIGHT    CLINICAL HISTORY:  Hip pain, osteonecrosis suspected, xray done;  Pain in right hip    TECHNIQUE:  Routine MRI evaluation of the right hip performed without contrast.    COMPARISON:  Radiograph 07/03/2025.    FINDINGS:  RIGHT ACETABULAR LABRUM: Degenerative fraying of the acetabular labrum with full-thickness chondrolabral separation at the 3 o'clock position and a small anterior paralabral cyst.    RIGHT FEMOROACETABULAR CARTILAGE/JOINT: High-grade partial to full-thickness chondral fissuring throughout the superomedial weight-bearing femoral head and acetabulum.  Moderate joint effusion with associated synovitis.  Thickening and increased signal intensity of the round ligament at the level of the fovea capitis.    BONES: Serpiginous area of marrow signal abnormality at the superomedial aspect of the humeral head that involves less than 50% of the articular surface.  There is associated loss of the " normal intralesional fat signal with associated subchondral plate collapse and fragmentation.  No marrow signal abnormality to suggest an infiltrative process.    TENDONS: Left gluteus minimus peritendinitis.  Right gluteus minimus, bilateral gluteus medius, rectus femoris, hamstring, adductors, iliopsoas and hip external rotators are intact.  Proximal IT bands are normal.    MUSCULATURE: Symmetric bulk and signal intensity.    MISCELLANEOUS: Abnormal morphology and signal intensity of the left acetabular labrum, likely related to degenerative fraying.  Left femoroacetabular chondral surfaces are preserved.  Ischiofemoral spaces are normal and symmetric.  Sciatic nerves demonstrate symmetric contour and signal intensity.  SI joints are normal and symmetric.  Lower lumbar spine spondylosis, incompletely evaluated.  Chronic degenerative changes of the pubic symphysis.  Limited evaluation of the visualized lower abdominal and pelvic structures demonstrates no significant abnormalities.  No lymphadenopathy.        X-Ray Hip 2 or 3 views Right with Pelvis when performed  Result Date: 7/3/2025  EXAMINATION:  XR HIP WITH PELVIS WHEN PERFORMED 2 OR 3 VIEWS RIGHT    TECHNIQUE:  Two views of the right hip were obtained, with an AP pelvis and a lateral projection of the right hip submitted.    COMPARISON:  No relevant comparison examinations are currently available.    FINDINGS:  No significant degree of hip joint space narrowing is observed on either side.  Right femoral head has an abnormal appearance, with mixed subchondral lucency and sclerosis noted, strongly suggesting avascular necrosis.  The right femoral head contour is fairly well maintained, without significant flattening.  No conventional radiographic indication of avascular necrosis of the left femoral head.  No evidence of recent or healing fracture or lytic destructive process.  SI joints appear unremarkable.           Assessment/Plan     Juliana Andersen  is a 74 y.o.female with:    1. Avascular necrosis of bone of right hip  - traMADoL (ULTRAM) 50 mg tablet; Take 1 tablet (50 mg total) by mouth every 6 (six) hours as needed for Pain.  Dispense: 30 tablet; Refill: 0    -seeing Dr. Tapia for routine f/u 2025, ok with refilling tramadol,  reviewed.  -continue PT and ortho f/u  -Continue current medications and maintain follow up with specialists.    -Follow up if symptoms worsen or fail to improve.       Lavonne Glass MD  [facility]              Answers submitted by the patient for this visit:  Review of Systems Questionnaire (Submitted on 2025)  activity change: No  unexpected weight change: No  rhinorrhea: No  trouble swallowing: No  visual disturbance: No  chest tightness: No  polyuria: No  difficulty urinating: No  menstrual problem: No  joint swelling: No  arthralgias: Yes  confusion: No  dysphoric mood: No         [1]   Social History  Tobacco Use    Smoking status: Former     Current packs/day: 0.00     Average packs/day: 1 pack/day for 15.0 years (15.0 ttl pk-yrs)     Types: Cigarettes     Quit date: 2005     Years since quittin.7    Smokeless tobacco: Never   Substance Use Topics    Alcohol use: Yes     Alcohol/week: 5.0 standard drinks of alcohol     Types: 5 Drinks containing 0.5 oz of alcohol per week    Drug use: Not Currently     Types: Marijuana   [2]   Outpatient Encounter Medications as of 2025   Medication Sig Dispense Refill    amiodarone (PACERONE) 200 MG Tab TAKE ONE TABLET BY MOUTH EVERY DAY 90 tablet 1    [Paused] amLODIPine (NORVASC) 10 MG tablet TAKE ONE TABLET BY MOUTH EVERY EVENING 90 tablet 3    ELIQUIS 5 mg Tab Take 1 tablet (5 mg total) by mouth 2 (two) times daily. 180 tablet 4    eszopiclone (LUNESTA) 3 mg Tab Take 1 tablet (3 mg total) by mouth every evening. 90 tablet 1    [Paused] irbesartan (AVAPRO) 150 MG tablet TAKE ONE TABLET BY MOUTH EVERY EVENING 90 tablet 3    levothyroxine (SYNTHROID) 75  MCG tablet TAKE ONE TABLET BY MOUTH EVERY DAY BEFORE BREAKFAST 90 tablet 3    rosuvastatin (CRESTOR) 20 MG tablet Take 1 tablet (20 mg total) by mouth every evening. 90 tablet 3    traMADoL (ULTRAM) 50 mg tablet Take 1 tablet (50 mg total) by mouth every 6 (six) hours as needed for Pain. 30 tablet 0    [DISCONTINUED] amiodarone (PACERONE) 200 MG Tab Take 1 tablet (200 mg total) by mouth once daily. 90 tablet 1     No facility-administered encounter medications on file as of 7/29/2025.

## 2025-08-05 ENCOUNTER — TELEPHONE (OUTPATIENT)
Dept: ORTHOPEDICS | Facility: CLINIC | Age: 75
End: 2025-08-05
Payer: MEDICARE

## 2025-08-13 ENCOUNTER — TELEPHONE (OUTPATIENT)
Dept: ORTHOPEDICS | Facility: CLINIC | Age: 75
End: 2025-08-13
Payer: MEDICARE

## 2025-08-22 ENCOUNTER — OFFICE VISIT (OUTPATIENT)
Dept: ORTHOPEDICS | Facility: CLINIC | Age: 75
End: 2025-08-22
Payer: MEDICARE

## 2025-08-22 VITALS — WEIGHT: 198.44 LBS | BODY MASS INDEX: 29.39 KG/M2 | HEIGHT: 69 IN

## 2025-08-22 DIAGNOSIS — M81.0 OSTEOPOROSIS, UNSPECIFIED OSTEOPOROSIS TYPE, UNSPECIFIED PATHOLOGICAL FRACTURE PRESENCE: Primary | ICD-10-CM

## 2025-08-22 DIAGNOSIS — M87.051 AVASCULAR NECROSIS OF BONE OF RIGHT HIP: ICD-10-CM

## 2025-08-22 DIAGNOSIS — M81.6 LOCALIZED OSTEOPOROSIS OF LEQUESNE: ICD-10-CM

## 2025-08-22 PROCEDURE — 99999 PR PBB SHADOW E&M-EST. PATIENT-LVL III: CPT | Mod: PBBFAC,,, | Performed by: PHYSICIAN ASSISTANT

## 2025-08-26 DIAGNOSIS — M87.051 AVASCULAR NECROSIS OF BONE OF RIGHT HIP: ICD-10-CM

## 2025-08-27 RX ORDER — TRAMADOL HYDROCHLORIDE 50 MG/1
50 TABLET, FILM COATED ORAL EVERY 6 HOURS PRN
Qty: 30 TABLET | Refills: 0 | Status: SHIPPED | OUTPATIENT
Start: 2025-08-27